# Patient Record
Sex: FEMALE | Race: WHITE | NOT HISPANIC OR LATINO | Employment: OTHER | URBAN - METROPOLITAN AREA
[De-identification: names, ages, dates, MRNs, and addresses within clinical notes are randomized per-mention and may not be internally consistent; named-entity substitution may affect disease eponyms.]

---

## 2019-01-22 ENCOUNTER — APPOINTMENT (OUTPATIENT)
Dept: RADIOLOGY | Facility: HOSPITAL | Age: 74
End: 2019-01-22
Payer: MEDICARE

## 2019-01-22 DIAGNOSIS — Z13.820 SCREENING FOR OSTEOPOROSIS: ICD-10-CM

## 2019-01-22 DIAGNOSIS — Z13.820 ENCOUNTER FOR SCREENING FOR OSTEOPOROSIS: ICD-10-CM

## 2019-01-22 PROCEDURE — 77080 DEXA BONE DENSITY SPINE HIP: ICD-10-PCS | Mod: 26,,, | Performed by: RADIOLOGY

## 2019-01-22 PROCEDURE — 77080 DXA BONE DENSITY AXIAL: CPT | Mod: TC

## 2019-01-22 PROCEDURE — 77080 DXA BONE DENSITY AXIAL: CPT | Mod: 26,,, | Performed by: RADIOLOGY

## 2020-05-20 LAB
CHOLEST SERPL-MSCNC: 163 MG/DL (ref 0–200)
HDLC SERPL-MCNC: 53 MG/DL
LDL/HDL RATIO: 1.6
LDLC SERPL CALC-MCNC: 83 MG/DL (ref 0–160)
NON HDL CHOL. (LDL+VLDL): 110
TRIGL SERPL-MCNC: 177 MG/DL

## 2020-08-14 LAB — A1C EXTERNAL: 7.1

## 2020-10-12 ENCOUNTER — OFFICE VISIT (OUTPATIENT)
Dept: INTERNAL MEDICINE | Facility: CLINIC | Age: 75
End: 2020-10-12
Payer: MEDICARE

## 2020-10-12 ENCOUNTER — LAB VISIT (OUTPATIENT)
Dept: LAB | Facility: HOSPITAL | Age: 75
End: 2020-10-12
Attending: FAMILY MEDICINE
Payer: MEDICARE

## 2020-10-12 ENCOUNTER — IMMUNIZATION (OUTPATIENT)
Dept: PHARMACY | Facility: CLINIC | Age: 75
End: 2020-10-12
Payer: MEDICARE

## 2020-10-12 VITALS
OXYGEN SATURATION: 98 % | HEIGHT: 66 IN | RESPIRATION RATE: 18 BRPM | WEIGHT: 181.88 LBS | DIASTOLIC BLOOD PRESSURE: 72 MMHG | TEMPERATURE: 98 F | BODY MASS INDEX: 29.23 KG/M2 | HEART RATE: 73 BPM | SYSTOLIC BLOOD PRESSURE: 136 MMHG

## 2020-10-12 DIAGNOSIS — I10 HYPERTENSION, UNSPECIFIED TYPE: ICD-10-CM

## 2020-10-12 DIAGNOSIS — E11.8 CONTROLLED TYPE 2 DIABETES MELLITUS WITH COMPLICATION, WITHOUT LONG-TERM CURRENT USE OF INSULIN: ICD-10-CM

## 2020-10-12 DIAGNOSIS — E03.9 HYPOTHYROIDISM, UNSPECIFIED TYPE: ICD-10-CM

## 2020-10-12 DIAGNOSIS — N18.9 CHRONIC KIDNEY DISEASE, UNSPECIFIED CKD STAGE: ICD-10-CM

## 2020-10-12 DIAGNOSIS — J30.9 ALLERGIC RHINITIS, UNSPECIFIED SEASONALITY, UNSPECIFIED TRIGGER: ICD-10-CM

## 2020-10-12 DIAGNOSIS — E78.5 HYPERLIPIDEMIA, UNSPECIFIED HYPERLIPIDEMIA TYPE: ICD-10-CM

## 2020-10-12 DIAGNOSIS — K76.0 FATTY LIVER: ICD-10-CM

## 2020-10-12 DIAGNOSIS — Z76.89 ENCOUNTER TO ESTABLISH CARE: Primary | ICD-10-CM

## 2020-10-12 DIAGNOSIS — K80.20 GALLSTONES: ICD-10-CM

## 2020-10-12 LAB
ALBUMIN SERPL BCP-MCNC: 3.9 G/DL (ref 3.5–5.2)
ALP SERPL-CCNC: 59 U/L (ref 55–135)
ALT SERPL W/O P-5'-P-CCNC: 24 U/L (ref 10–44)
ANION GAP SERPL CALC-SCNC: 12 MMOL/L (ref 8–16)
AST SERPL-CCNC: 22 U/L (ref 10–40)
BASOPHILS # BLD AUTO: 0.07 K/UL (ref 0–0.2)
BASOPHILS NFR BLD: 0.7 % (ref 0–1.9)
BILIRUB SERPL-MCNC: 0.3 MG/DL (ref 0.1–1)
BUN SERPL-MCNC: 20 MG/DL (ref 8–23)
CALCIUM SERPL-MCNC: 10.2 MG/DL (ref 8.7–10.5)
CHLORIDE SERPL-SCNC: 102 MMOL/L (ref 95–110)
CHOLEST SERPL-MCNC: 127 MG/DL (ref 120–199)
CHOLEST/HDLC SERPL: 2.5 {RATIO} (ref 2–5)
CO2 SERPL-SCNC: 23 MMOL/L (ref 23–29)
CREAT SERPL-MCNC: 1.2 MG/DL (ref 0.5–1.4)
DIFFERENTIAL METHOD: ABNORMAL
EOSINOPHIL # BLD AUTO: 0.1 K/UL (ref 0–0.5)
EOSINOPHIL NFR BLD: 0.9 % (ref 0–8)
ERYTHROCYTE [DISTWIDTH] IN BLOOD BY AUTOMATED COUNT: 14 % (ref 11.5–14.5)
EST. GFR  (AFRICAN AMERICAN): 52.2 ML/MIN/1.73 M^2
EST. GFR  (NON AFRICAN AMERICAN): 45.3 ML/MIN/1.73 M^2
ESTIMATED AVG GLUCOSE: 137 MG/DL (ref 68–131)
GLUCOSE SERPL-MCNC: 113 MG/DL (ref 70–110)
HBA1C MFR BLD HPLC: 6.4 % (ref 4–5.6)
HCT VFR BLD AUTO: 38.6 % (ref 37–48.5)
HDLC SERPL-MCNC: 50 MG/DL (ref 40–75)
HDLC SERPL: 39.4 % (ref 20–50)
HGB BLD-MCNC: 12.2 G/DL (ref 12–16)
IMM GRANULOCYTES # BLD AUTO: 0.03 K/UL (ref 0–0.04)
IMM GRANULOCYTES NFR BLD AUTO: 0.3 % (ref 0–0.5)
LDLC SERPL CALC-MCNC: 52.4 MG/DL (ref 63–159)
LYMPHOCYTES # BLD AUTO: 2.5 K/UL (ref 1–4.8)
LYMPHOCYTES NFR BLD: 24.2 % (ref 18–48)
MCH RBC QN AUTO: 28.1 PG (ref 27–31)
MCHC RBC AUTO-ENTMCNC: 31.6 G/DL (ref 32–36)
MCV RBC AUTO: 89 FL (ref 82–98)
MONOCYTES # BLD AUTO: 0.7 K/UL (ref 0.3–1)
MONOCYTES NFR BLD: 6.3 % (ref 4–15)
NEUTROPHILS # BLD AUTO: 7 K/UL (ref 1.8–7.7)
NEUTROPHILS NFR BLD: 67.6 % (ref 38–73)
NONHDLC SERPL-MCNC: 77 MG/DL
NRBC BLD-RTO: 0 /100 WBC
PLATELET # BLD AUTO: 349 K/UL (ref 150–350)
PMV BLD AUTO: 11.9 FL (ref 9.2–12.9)
POTASSIUM SERPL-SCNC: 4.3 MMOL/L (ref 3.5–5.1)
PROT SERPL-MCNC: 7.4 G/DL (ref 6–8.4)
RBC # BLD AUTO: 4.34 M/UL (ref 4–5.4)
SODIUM SERPL-SCNC: 137 MMOL/L (ref 136–145)
TRIGL SERPL-MCNC: 123 MG/DL (ref 30–150)
TSH SERPL DL<=0.005 MIU/L-ACNC: 0.76 UIU/ML (ref 0.4–4)
WBC # BLD AUTO: 10.3 K/UL (ref 3.9–12.7)

## 2020-10-12 PROCEDURE — 3008F PR BODY MASS INDEX (BMI) DOCUMENTED: ICD-10-PCS | Mod: HCNC,CPTII,S$GLB, | Performed by: FAMILY MEDICINE

## 2020-10-12 PROCEDURE — 1100F PR PT FALLS ASSESS DOC 2+ FALLS/FALL W/INJURY/YR: ICD-10-PCS | Mod: HCNC,CPTII,S$GLB, | Performed by: FAMILY MEDICINE

## 2020-10-12 PROCEDURE — 99999 PR PBB SHADOW E&M-EST. PATIENT-LVL IV: CPT | Mod: PBBFAC,HCNC,, | Performed by: FAMILY MEDICINE

## 2020-10-12 PROCEDURE — 80053 COMPREHEN METABOLIC PANEL: CPT | Mod: HCNC

## 2020-10-12 PROCEDURE — 1100F PTFALLS ASSESS-DOCD GE2>/YR: CPT | Mod: HCNC,CPTII,S$GLB, | Performed by: FAMILY MEDICINE

## 2020-10-12 PROCEDURE — 83036 HEMOGLOBIN GLYCOSYLATED A1C: CPT | Mod: HCNC

## 2020-10-12 PROCEDURE — 1126F AMNT PAIN NOTED NONE PRSNT: CPT | Mod: HCNC,S$GLB,, | Performed by: FAMILY MEDICINE

## 2020-10-12 PROCEDURE — 80061 LIPID PANEL: CPT | Mod: HCNC

## 2020-10-12 PROCEDURE — 1159F PR MEDICATION LIST DOCUMENTED IN MEDICAL RECORD: ICD-10-PCS | Mod: HCNC,S$GLB,, | Performed by: FAMILY MEDICINE

## 2020-10-12 PROCEDURE — 99203 PR OFFICE/OUTPT VISIT, NEW, LEVL III, 30-44 MIN: ICD-10-PCS | Mod: HCNC,S$GLB,, | Performed by: FAMILY MEDICINE

## 2020-10-12 PROCEDURE — 3288F FALL RISK ASSESSMENT DOCD: CPT | Mod: HCNC,CPTII,S$GLB, | Performed by: FAMILY MEDICINE

## 2020-10-12 PROCEDURE — 36415 COLL VENOUS BLD VENIPUNCTURE: CPT | Mod: HCNC

## 2020-10-12 PROCEDURE — 1159F MED LIST DOCD IN RCRD: CPT | Mod: HCNC,S$GLB,, | Performed by: FAMILY MEDICINE

## 2020-10-12 PROCEDURE — 85025 COMPLETE CBC W/AUTO DIFF WBC: CPT | Mod: HCNC

## 2020-10-12 PROCEDURE — 99999 PR PBB SHADOW E&M-EST. PATIENT-LVL IV: ICD-10-PCS | Mod: PBBFAC,HCNC,, | Performed by: FAMILY MEDICINE

## 2020-10-12 PROCEDURE — 3288F PR FALLS RISK ASSESSMENT DOCUMENTED: ICD-10-PCS | Mod: HCNC,CPTII,S$GLB, | Performed by: FAMILY MEDICINE

## 2020-10-12 PROCEDURE — 1126F PR PAIN SEVERITY QUANTIFIED, NO PAIN PRESENT: ICD-10-PCS | Mod: HCNC,S$GLB,, | Performed by: FAMILY MEDICINE

## 2020-10-12 PROCEDURE — 84443 ASSAY THYROID STIM HORMONE: CPT | Mod: HCNC

## 2020-10-12 PROCEDURE — 99203 OFFICE O/P NEW LOW 30 MIN: CPT | Mod: HCNC,S$GLB,, | Performed by: FAMILY MEDICINE

## 2020-10-12 PROCEDURE — 3008F BODY MASS INDEX DOCD: CPT | Mod: HCNC,CPTII,S$GLB, | Performed by: FAMILY MEDICINE

## 2020-10-12 RX ORDER — PRAVASTATIN SODIUM 80 MG/1
TABLET ORAL
COMMUNITY
End: 2021-04-05 | Stop reason: SDUPTHER

## 2020-10-12 RX ORDER — LEVOTHYROXINE SODIUM 75 UG/1
TABLET ORAL
COMMUNITY
Start: 2020-09-02 | End: 2021-04-05 | Stop reason: SDUPTHER

## 2020-10-12 RX ORDER — ATENOLOL 25 MG/1
TABLET ORAL
COMMUNITY
End: 2021-04-05 | Stop reason: SDUPTHER

## 2020-10-12 RX ORDER — GLIMEPIRIDE 2 MG/1
1 TABLET ORAL
COMMUNITY
End: 2021-04-05 | Stop reason: SDUPTHER

## 2020-10-12 RX ORDER — FLUTICASONE PROPIONATE 50 MCG
1 SPRAY, SUSPENSION (ML) NASAL DAILY
COMMUNITY
End: 2021-04-05 | Stop reason: SDUPTHER

## 2020-10-12 RX ORDER — ASPIRIN 81 MG/1
TABLET ORAL
COMMUNITY
End: 2021-04-05 | Stop reason: SDUPTHER

## 2020-10-12 RX ORDER — METFORMIN HYDROCHLORIDE 500 MG/1
TABLET ORAL
COMMUNITY
End: 2021-04-05 | Stop reason: SDUPTHER

## 2020-10-12 RX ORDER — FLUTICASONE PROPIONATE 50 UG/1
1 POWDER, METERED RESPIRATORY (INHALATION)
COMMUNITY
End: 2021-04-05 | Stop reason: SDUPTHER

## 2020-10-12 RX ORDER — LISINOPRIL AND HYDROCHLOROTHIAZIDE 12.5; 2 MG/1; MG/1
TABLET ORAL
COMMUNITY
End: 2021-04-05 | Stop reason: SDUPTHER

## 2020-10-12 RX ORDER — ASCORBIC ACID 250 MG
TABLET ORAL
COMMUNITY

## 2020-10-12 RX ORDER — AMLODIPINE BESYLATE 10 MG/1
TABLET ORAL
COMMUNITY
End: 2021-04-05 | Stop reason: SDUPTHER

## 2020-10-12 NOTE — PROGRESS NOTES
Subjective:       Patient ID: Patsy Perez is a 74 y.o. female.    Chief Complaint: Establish Care    HPI     74    Past Medical History:   Diagnosis Date    CKD (chronic kidney disease)     DM II (diabetes mellitus, type II), controlled     Fatty liver     Gallstones     found on US    HLD (hyperlipidemia)     Hypertension     Hypothyroidism     s/p thyroidectomy         Past Surgical History:   Procedure Laterality Date    APPENDECTOMY      1985    HYSTERECTOMY      2009 - Dr[facility]    THYROIDECTOMY      1985       Social History     Tobacco Use   Smoking Status Former Smoker     Family History   Problem Relation Age of Onset    Diabetes Mother     Breast cancer Mother         late 70s    Osteoarthritis Mother         double knee replacement    Heart disease Father        Checks sugars and pressures    Sugar high in am  No higher than 180 ever    Checks pressure  Controlled  No hypotensive episodes    Has been tested for hep c    Due for shingrix - had old vaccine    Tolerating medications w/out issue    Gallstones  Asymptomatic  Has seen GI.          Review of Systems   Constitutional: Negative for chills and fever.   HENT: Negative for trouble swallowing.    Eyes: Negative for visual disturbance.   Respiratory: Negative for shortness of breath.    Cardiovascular: Negative for chest pain.   Gastrointestinal: Negative for abdominal pain.   Skin: Negative for color change.   Neurological: Negative for dizziness.   Psychiatric/Behavioral: Negative for confusion.       Objective:       Vitals:    10/12/20 0914   BP: 136/72   Pulse: 73   Resp: 18   Temp: 97.9 °F (36.6 °C)       Protective Sensation (w/ 10 gram monofilament):  Right: Intact  Left: Intact    Visual Inspection:  Normal -  Bilateral    Pedal Pulses:   Right: Present  Left: Present    Posterior tibialis:   Right:Present  Left: Present        Physical Exam  Constitutional:       General: She is not in acute  distress.     Appearance: Normal appearance. She is well-developed.   HENT:      Head: Normocephalic and atraumatic.   Eyes:      General: Lids are normal.      Conjunctiva/sclera: Conjunctivae normal.   Neck:      Musculoskeletal: Full passive range of motion without pain and normal range of motion.   Cardiovascular:      Rate and Rhythm: Normal rate and regular rhythm.      Heart sounds: Normal heart sounds.   Pulmonary:      Effort: Pulmonary effort is normal. No respiratory distress.      Breath sounds: Normal breath sounds.   Abdominal:      General: There is no distension.      Palpations: Abdomen is soft.   Musculoskeletal: Normal range of motion.   Lymphadenopathy:      Cervical: No cervical adenopathy.   Skin:     Coloration: Skin is not pale.   Neurological:      Mental Status: She is alert and oriented to person, place, and time. She is not disoriented.   Psychiatric:         Speech: Speech normal.         Behavior: Behavior normal.         Thought Content: Thought content normal.         Assessment:       1. Encounter to establish care    2. Hypertension, unspecified type    3. Hyperlipidemia, unspecified hyperlipidemia type    4. Hypothyroidism, unspecified type    5. Chronic kidney disease, unspecified CKD stage    6. Controlled type 2 diabetes mellitus with complication, without long-term current use of insulin    7. Allergic rhinitis, unspecified seasonality, unspecified trigger        Plan:   Encounter to establish care    Hypertension, unspecified type  Well controlled  Lisinopril-hctz 20-12.5  Atenolol 25 mg  Amlodipine 10 mg  Controlled.      Hyperlipidemia  Pravastatin  Lipid profile      Hypothyroidism  Synthroid  TSH check      Chronic kidney disease  Uncertain of stage  Will check CMP    DM II  A1c check  On metformin 500 ( need to watch renal function )  amaryl 2 mg  januvia 100 mg    Immunization due  Shingles shot    Allergic rhinitis  flonase    Sign for mammo and colon reports and eye  exam

## 2020-10-13 ENCOUNTER — PATIENT OUTREACH (OUTPATIENT)
Dept: ADMINISTRATIVE | Facility: HOSPITAL | Age: 75
End: 2020-10-13

## 2020-10-13 NOTE — PROGRESS NOTES
AMBAR uploaded and faxed to Salvatore Barajas MD for Mammogram & Ambreen Link MD for colonoscopy.

## 2020-10-14 ENCOUNTER — PATIENT OUTREACH (OUTPATIENT)
Dept: ADMINISTRATIVE | Facility: HOSPITAL | Age: 75
End: 2020-10-14

## 2020-11-12 ENCOUNTER — PATIENT OUTREACH (OUTPATIENT)
Dept: ADMINISTRATIVE | Facility: HOSPITAL | Age: 75
End: 2020-11-12

## 2020-12-11 ENCOUNTER — PATIENT MESSAGE (OUTPATIENT)
Dept: OTHER | Facility: OTHER | Age: 75
End: 2020-12-11

## 2020-12-14 ENCOUNTER — IMMUNIZATION (OUTPATIENT)
Dept: PHARMACY | Facility: CLINIC | Age: 75
End: 2020-12-14
Payer: MEDICARE

## 2021-01-10 ENCOUNTER — IMMUNIZATION (OUTPATIENT)
Dept: INTERNAL MEDICINE | Facility: CLINIC | Age: 76
End: 2021-01-10
Payer: MEDICARE

## 2021-01-10 DIAGNOSIS — Z23 NEED FOR VACCINATION: ICD-10-CM

## 2021-01-10 PROCEDURE — 91300 COVID-19, MRNA, LNP-S, PF, 30 MCG/0.3 ML DOSE VACCINE: CPT | Mod: PBBFAC | Performed by: FAMILY MEDICINE

## 2021-01-12 ENCOUNTER — LAB VISIT (OUTPATIENT)
Dept: LAB | Facility: HOSPITAL | Age: 76
End: 2021-01-12
Attending: FAMILY MEDICINE
Payer: MEDICARE

## 2021-01-12 ENCOUNTER — OFFICE VISIT (OUTPATIENT)
Dept: INTERNAL MEDICINE | Facility: CLINIC | Age: 76
End: 2021-01-12
Payer: MEDICARE

## 2021-01-12 VITALS
OXYGEN SATURATION: 99 % | SYSTOLIC BLOOD PRESSURE: 131 MMHG | DIASTOLIC BLOOD PRESSURE: 72 MMHG | TEMPERATURE: 98 F | WEIGHT: 181.88 LBS | RESPIRATION RATE: 18 BRPM | BODY MASS INDEX: 29.81 KG/M2 | HEART RATE: 71 BPM

## 2021-01-12 DIAGNOSIS — E03.9 HYPOTHYROIDISM, UNSPECIFIED TYPE: ICD-10-CM

## 2021-01-12 DIAGNOSIS — E11.8 CONTROLLED TYPE 2 DIABETES MELLITUS WITH COMPLICATION, WITHOUT LONG-TERM CURRENT USE OF INSULIN: Primary | ICD-10-CM

## 2021-01-12 DIAGNOSIS — I10 HYPERTENSION, UNSPECIFIED TYPE: ICD-10-CM

## 2021-01-12 DIAGNOSIS — N18.31 STAGE 3A CHRONIC KIDNEY DISEASE: ICD-10-CM

## 2021-01-12 DIAGNOSIS — E78.5 HYPERLIPIDEMIA, UNSPECIFIED HYPERLIPIDEMIA TYPE: ICD-10-CM

## 2021-01-12 LAB
ALBUMIN SERPL BCP-MCNC: 3.8 G/DL (ref 3.5–5.2)
ALBUMIN/CREAT UR: NORMAL UG/MG (ref 0–30)
ALP SERPL-CCNC: 69 U/L (ref 55–135)
ALT SERPL W/O P-5'-P-CCNC: 26 U/L (ref 10–44)
ANION GAP SERPL CALC-SCNC: 12 MMOL/L (ref 8–16)
AST SERPL-CCNC: 25 U/L (ref 10–40)
BILIRUB SERPL-MCNC: 0.4 MG/DL (ref 0.1–1)
BUN SERPL-MCNC: 17 MG/DL (ref 8–23)
CALCIUM SERPL-MCNC: 9.1 MG/DL (ref 8.7–10.5)
CHLORIDE SERPL-SCNC: 99 MMOL/L (ref 95–110)
CO2 SERPL-SCNC: 24 MMOL/L (ref 23–29)
CREAT SERPL-MCNC: 1.1 MG/DL (ref 0.5–1.4)
CREAT UR-MCNC: 50 MG/DL (ref 15–325)
EST. GFR  (AFRICAN AMERICAN): 57.6 ML/MIN/1.73 M^2
EST. GFR  (NON AFRICAN AMERICAN): 49.9 ML/MIN/1.73 M^2
GLUCOSE SERPL-MCNC: 106 MG/DL (ref 70–110)
MICROALBUMIN UR DL<=1MG/L-MCNC: <2.5 UG/ML
POTASSIUM SERPL-SCNC: 4.5 MMOL/L (ref 3.5–5.1)
PROT SERPL-MCNC: 7.3 G/DL (ref 6–8.4)
SODIUM SERPL-SCNC: 135 MMOL/L (ref 136–145)

## 2021-01-12 PROCEDURE — 3044F HG A1C LEVEL LT 7.0%: CPT | Mod: HCNC,CPTII,S$GLB, | Performed by: FAMILY MEDICINE

## 2021-01-12 PROCEDURE — 1126F PR PAIN SEVERITY QUANTIFIED, NO PAIN PRESENT: ICD-10-PCS | Mod: HCNC,S$GLB,, | Performed by: FAMILY MEDICINE

## 2021-01-12 PROCEDURE — 80053 COMPREHEN METABOLIC PANEL: CPT | Mod: HCNC

## 2021-01-12 PROCEDURE — 1126F AMNT PAIN NOTED NONE PRSNT: CPT | Mod: HCNC,S$GLB,, | Performed by: FAMILY MEDICINE

## 2021-01-12 PROCEDURE — 3288F PR FALLS RISK ASSESSMENT DOCUMENTED: ICD-10-PCS | Mod: HCNC,CPTII,S$GLB, | Performed by: FAMILY MEDICINE

## 2021-01-12 PROCEDURE — 36415 COLL VENOUS BLD VENIPUNCTURE: CPT | Mod: HCNC

## 2021-01-12 PROCEDURE — 82570 ASSAY OF URINE CREATININE: CPT | Mod: HCNC

## 2021-01-12 PROCEDURE — 99999 PR PBB SHADOW E&M-EST. PATIENT-LVL III: CPT | Mod: PBBFAC,HCNC,, | Performed by: FAMILY MEDICINE

## 2021-01-12 PROCEDURE — 82043 UR ALBUMIN QUANTITATIVE: CPT | Mod: HCNC

## 2021-01-12 PROCEDURE — 3078F PR MOST RECENT DIASTOLIC BLOOD PRESSURE < 80 MM HG: ICD-10-PCS | Mod: HCNC,CPTII,S$GLB, | Performed by: FAMILY MEDICINE

## 2021-01-12 PROCEDURE — 99214 PR OFFICE/OUTPT VISIT, EST, LEVL IV, 30-39 MIN: ICD-10-PCS | Mod: HCNC,S$GLB,, | Performed by: FAMILY MEDICINE

## 2021-01-12 PROCEDURE — 3075F SYST BP GE 130 - 139MM HG: CPT | Mod: HCNC,CPTII,S$GLB, | Performed by: FAMILY MEDICINE

## 2021-01-12 PROCEDURE — 3075F PR MOST RECENT SYSTOLIC BLOOD PRESS GE 130-139MM HG: ICD-10-PCS | Mod: HCNC,CPTII,S$GLB, | Performed by: FAMILY MEDICINE

## 2021-01-12 PROCEDURE — 3008F PR BODY MASS INDEX (BMI) DOCUMENTED: ICD-10-PCS | Mod: HCNC,CPTII,S$GLB, | Performed by: FAMILY MEDICINE

## 2021-01-12 PROCEDURE — 3008F BODY MASS INDEX DOCD: CPT | Mod: HCNC,CPTII,S$GLB, | Performed by: FAMILY MEDICINE

## 2021-01-12 PROCEDURE — 99999 PR PBB SHADOW E&M-EST. PATIENT-LVL III: ICD-10-PCS | Mod: PBBFAC,HCNC,, | Performed by: FAMILY MEDICINE

## 2021-01-12 PROCEDURE — 3044F PR MOST RECENT HEMOGLOBIN A1C LEVEL <7.0%: ICD-10-PCS | Mod: HCNC,CPTII,S$GLB, | Performed by: FAMILY MEDICINE

## 2021-01-12 PROCEDURE — 1159F MED LIST DOCD IN RCRD: CPT | Mod: HCNC,S$GLB,, | Performed by: FAMILY MEDICINE

## 2021-01-12 PROCEDURE — 99214 OFFICE O/P EST MOD 30 MIN: CPT | Mod: HCNC,S$GLB,, | Performed by: FAMILY MEDICINE

## 2021-01-12 PROCEDURE — 3288F FALL RISK ASSESSMENT DOCD: CPT | Mod: HCNC,CPTII,S$GLB, | Performed by: FAMILY MEDICINE

## 2021-01-12 PROCEDURE — 1101F PT FALLS ASSESS-DOCD LE1/YR: CPT | Mod: HCNC,CPTII,S$GLB, | Performed by: FAMILY MEDICINE

## 2021-01-12 PROCEDURE — 1101F PR PT FALLS ASSESS DOC 0-1 FALLS W/OUT INJ PAST YR: ICD-10-PCS | Mod: HCNC,CPTII,S$GLB, | Performed by: FAMILY MEDICINE

## 2021-01-12 PROCEDURE — 3078F DIAST BP <80 MM HG: CPT | Mod: HCNC,CPTII,S$GLB, | Performed by: FAMILY MEDICINE

## 2021-01-12 PROCEDURE — 1159F PR MEDICATION LIST DOCUMENTED IN MEDICAL RECORD: ICD-10-PCS | Mod: HCNC,S$GLB,, | Performed by: FAMILY MEDICINE

## 2021-01-31 ENCOUNTER — IMMUNIZATION (OUTPATIENT)
Dept: INTERNAL MEDICINE | Facility: CLINIC | Age: 76
End: 2021-01-31
Payer: MEDICARE

## 2021-01-31 DIAGNOSIS — Z23 NEED FOR VACCINATION: Primary | ICD-10-CM

## 2021-01-31 PROCEDURE — 91300 COVID-19, MRNA, LNP-S, PF, 30 MCG/0.3 ML DOSE VACCINE: CPT | Mod: PBBFAC | Performed by: FAMILY MEDICINE

## 2021-01-31 PROCEDURE — 0002A COVID-19, MRNA, LNP-S, PF, 30 MCG/0.3 ML DOSE VACCINE: CPT | Mod: PBBFAC | Performed by: FAMILY MEDICINE

## 2021-02-05 ENCOUNTER — PATIENT OUTREACH (OUTPATIENT)
Dept: ADMINISTRATIVE | Facility: HOSPITAL | Age: 76
End: 2021-02-05

## 2021-02-22 LAB
LEFT EYE DM RETINOPATHY: NEGATIVE
RIGHT EYE DM RETINOPATHY: NEGATIVE

## 2021-04-07 RX ORDER — ATENOLOL 25 MG/1
TABLET ORAL
Qty: 90 TABLET | Refills: 1 | Status: SHIPPED | OUTPATIENT
Start: 2021-04-07 | End: 2021-04-13 | Stop reason: SDUPTHER

## 2021-04-07 RX ORDER — LEVOTHYROXINE SODIUM 75 UG/1
TABLET ORAL
Qty: 90 TABLET | Refills: 1 | Status: SHIPPED | OUTPATIENT
Start: 2021-04-07 | End: 2022-03-02 | Stop reason: SDUPTHER

## 2021-04-07 RX ORDER — FLUTICASONE PROPIONATE 50 MCG
1 SPRAY, SUSPENSION (ML) NASAL DAILY
Qty: 18.2 ML | Refills: 4 | Status: SHIPPED | OUTPATIENT
Start: 2021-04-07 | End: 2021-12-23

## 2021-04-07 RX ORDER — AMLODIPINE BESYLATE 10 MG/1
TABLET ORAL
Qty: 90 TABLET | Refills: 1 | Status: SHIPPED | OUTPATIENT
Start: 2021-04-07 | End: 2021-04-13 | Stop reason: SDUPTHER

## 2021-04-07 RX ORDER — GLIMEPIRIDE 2 MG/1
TABLET ORAL
Qty: 90 TABLET | Refills: 1 | Status: SHIPPED | OUTPATIENT
Start: 2021-04-07 | End: 2021-04-13 | Stop reason: SDUPTHER

## 2021-04-07 RX ORDER — LISINOPRIL AND HYDROCHLOROTHIAZIDE 12.5; 2 MG/1; MG/1
1 TABLET ORAL DAILY
Qty: 90 TABLET | Refills: 1 | Status: SHIPPED | OUTPATIENT
Start: 2021-04-07 | End: 2021-04-13 | Stop reason: SDUPTHER

## 2021-04-07 RX ORDER — ASCORBIC ACID 250 MG
TABLET ORAL
OUTPATIENT
Start: 2021-04-07

## 2021-04-07 RX ORDER — METFORMIN HYDROCHLORIDE 500 MG/1
TABLET ORAL
Qty: 90 TABLET | Refills: 1 | Status: SHIPPED | OUTPATIENT
Start: 2021-04-07 | End: 2021-04-13 | Stop reason: SDUPTHER

## 2021-04-07 RX ORDER — LANCETS
1 EACH MISCELLANEOUS 2 TIMES DAILY
Qty: 100 EACH | Refills: 1 | Status: SHIPPED | OUTPATIENT
Start: 2021-04-07 | End: 2021-12-13 | Stop reason: SDUPTHER

## 2021-04-07 RX ORDER — FLUTICASONE PROPIONATE 50 UG/1
1 POWDER, METERED RESPIRATORY (INHALATION) DAILY
Qty: 1 EACH | Refills: 4 | Status: SHIPPED | OUTPATIENT
Start: 2021-04-07 | End: 2021-06-11

## 2021-04-07 RX ORDER — ASPIRIN 81 MG/1
TABLET ORAL
Qty: 90 TABLET | Refills: 1 | Status: SHIPPED | OUTPATIENT
Start: 2021-04-07

## 2021-04-07 RX ORDER — PRAVASTATIN SODIUM 80 MG/1
TABLET ORAL
Qty: 90 TABLET | Refills: 1 | Status: SHIPPED | OUTPATIENT
Start: 2021-04-07 | End: 2021-04-13 | Stop reason: SDUPTHER

## 2021-04-09 ENCOUNTER — TELEPHONE (OUTPATIENT)
Dept: FAMILY MEDICINE | Facility: CLINIC | Age: 76
End: 2021-04-09

## 2021-04-12 ENCOUNTER — TELEPHONE (OUTPATIENT)
Dept: INTERNAL MEDICINE | Facility: CLINIC | Age: 76
End: 2021-04-12

## 2021-04-13 RX ORDER — ATENOLOL 25 MG/1
25 TABLET ORAL DAILY
Qty: 90 TABLET | Refills: 1 | Status: SHIPPED | OUTPATIENT
Start: 2021-04-13 | End: 2021-07-29

## 2021-04-13 RX ORDER — AMLODIPINE BESYLATE 10 MG/1
10 TABLET ORAL DAILY
Qty: 90 TABLET | Refills: 1 | Status: SHIPPED | OUTPATIENT
Start: 2021-04-13 | End: 2021-09-17

## 2021-04-13 RX ORDER — METFORMIN HYDROCHLORIDE 500 MG/1
500 TABLET ORAL 2 TIMES DAILY WITH MEALS
Qty: 180 TABLET | Refills: 1 | Status: SHIPPED | OUTPATIENT
Start: 2021-04-13 | End: 2021-11-29 | Stop reason: SDUPTHER

## 2021-04-13 RX ORDER — LISINOPRIL AND HYDROCHLOROTHIAZIDE 12.5; 2 MG/1; MG/1
2 TABLET ORAL DAILY
Qty: 90 TABLET | Refills: 1 | Status: SHIPPED | OUTPATIENT
Start: 2021-04-13 | End: 2021-09-17

## 2021-04-13 RX ORDER — GLIMEPIRIDE 2 MG/1
1 TABLET ORAL
Qty: 90 TABLET | Refills: 1 | Status: SHIPPED | OUTPATIENT
Start: 2021-04-13 | End: 2022-04-21

## 2021-04-13 RX ORDER — PRAVASTATIN SODIUM 80 MG/1
80 TABLET ORAL DAILY
Qty: 90 TABLET | Refills: 1 | Status: SHIPPED | OUTPATIENT
Start: 2021-04-13 | End: 2022-04-21

## 2021-05-19 DIAGNOSIS — E11.8 CONTROLLED TYPE 2 DIABETES MELLITUS WITH COMPLICATION, WITHOUT LONG-TERM CURRENT USE OF INSULIN: Primary | ICD-10-CM

## 2021-05-20 ENCOUNTER — TELEPHONE (OUTPATIENT)
Dept: INTERNAL MEDICINE | Facility: CLINIC | Age: 76
End: 2021-05-20

## 2021-05-21 ENCOUNTER — LAB VISIT (OUTPATIENT)
Dept: LAB | Facility: HOSPITAL | Age: 76
End: 2021-05-21
Attending: FAMILY MEDICINE
Payer: MEDICARE

## 2021-05-21 DIAGNOSIS — E11.8 CONTROLLED TYPE 2 DIABETES MELLITUS WITH COMPLICATION, WITHOUT LONG-TERM CURRENT USE OF INSULIN: ICD-10-CM

## 2021-05-21 LAB
ALBUMIN SERPL BCP-MCNC: 3.8 G/DL (ref 3.5–5.2)
ALP SERPL-CCNC: 60 U/L (ref 55–135)
ALT SERPL W/O P-5'-P-CCNC: 24 U/L (ref 10–44)
ANION GAP SERPL CALC-SCNC: 12 MMOL/L (ref 8–16)
AST SERPL-CCNC: 26 U/L (ref 10–40)
BILIRUB SERPL-MCNC: 0.4 MG/DL (ref 0.1–1)
BUN SERPL-MCNC: 29 MG/DL (ref 8–23)
CALCIUM SERPL-MCNC: 9.6 MG/DL (ref 8.7–10.5)
CHLORIDE SERPL-SCNC: 103 MMOL/L (ref 95–110)
CO2 SERPL-SCNC: 21 MMOL/L (ref 23–29)
CREAT SERPL-MCNC: 1.4 MG/DL (ref 0.5–1.4)
EST. GFR  (AFRICAN AMERICAN): 43 ML/MIN/1.73 M^2
EST. GFR  (NON AFRICAN AMERICAN): 37.3 ML/MIN/1.73 M^2
ESTIMATED AVG GLUCOSE: 166 MG/DL (ref 68–131)
GLUCOSE SERPL-MCNC: 165 MG/DL (ref 70–110)
HBA1C MFR BLD: 7.4 % (ref 4–5.6)
POTASSIUM SERPL-SCNC: 4.1 MMOL/L (ref 3.5–5.1)
PROT SERPL-MCNC: 7.3 G/DL (ref 6–8.4)
SODIUM SERPL-SCNC: 136 MMOL/L (ref 136–145)

## 2021-05-21 PROCEDURE — 36415 COLL VENOUS BLD VENIPUNCTURE: CPT | Performed by: FAMILY MEDICINE

## 2021-05-21 PROCEDURE — 80053 COMPREHEN METABOLIC PANEL: CPT | Performed by: FAMILY MEDICINE

## 2021-05-21 PROCEDURE — 83036 HEMOGLOBIN GLYCOSYLATED A1C: CPT | Performed by: FAMILY MEDICINE

## 2021-05-25 ENCOUNTER — TELEPHONE (OUTPATIENT)
Dept: INTERNAL MEDICINE | Facility: CLINIC | Age: 76
End: 2021-05-25

## 2021-06-11 ENCOUNTER — OFFICE VISIT (OUTPATIENT)
Dept: INTERNAL MEDICINE | Facility: CLINIC | Age: 76
End: 2021-06-11
Payer: MEDICARE

## 2021-06-11 ENCOUNTER — LAB VISIT (OUTPATIENT)
Dept: LAB | Facility: HOSPITAL | Age: 76
End: 2021-06-11
Attending: FAMILY MEDICINE
Payer: MEDICARE

## 2021-06-11 VITALS
DIASTOLIC BLOOD PRESSURE: 60 MMHG | SYSTOLIC BLOOD PRESSURE: 128 MMHG | TEMPERATURE: 98 F | HEART RATE: 80 BPM | BODY MASS INDEX: 28.49 KG/M2 | WEIGHT: 177.25 LBS | OXYGEN SATURATION: 96 % | HEIGHT: 66 IN

## 2021-06-11 DIAGNOSIS — E03.9 HYPOTHYROIDISM, UNSPECIFIED TYPE: ICD-10-CM

## 2021-06-11 DIAGNOSIS — I10 HYPERTENSION, UNSPECIFIED TYPE: ICD-10-CM

## 2021-06-11 DIAGNOSIS — N18.31 STAGE 3A CHRONIC KIDNEY DISEASE: ICD-10-CM

## 2021-06-11 DIAGNOSIS — Z12.31 ENCOUNTER FOR SCREENING MAMMOGRAM FOR MALIGNANT NEOPLASM OF BREAST: ICD-10-CM

## 2021-06-11 DIAGNOSIS — N18.31 STAGE 3A CHRONIC KIDNEY DISEASE: Primary | ICD-10-CM

## 2021-06-11 DIAGNOSIS — E11.8 CONTROLLED TYPE 2 DIABETES MELLITUS WITH COMPLICATION, WITHOUT LONG-TERM CURRENT USE OF INSULIN: ICD-10-CM

## 2021-06-11 LAB
ANION GAP SERPL CALC-SCNC: 13 MMOL/L (ref 8–16)
BUN SERPL-MCNC: 16 MG/DL (ref 8–23)
CALCIUM SERPL-MCNC: 9.6 MG/DL (ref 8.7–10.5)
CHLORIDE SERPL-SCNC: 96 MMOL/L (ref 95–110)
CO2 SERPL-SCNC: 21 MMOL/L (ref 23–29)
CREAT SERPL-MCNC: 1.2 MG/DL (ref 0.5–1.4)
EST. GFR  (AFRICAN AMERICAN): 51.8 ML/MIN/1.73 M^2
EST. GFR  (NON AFRICAN AMERICAN): 44.9 ML/MIN/1.73 M^2
GLUCOSE SERPL-MCNC: 91 MG/DL (ref 70–110)
POTASSIUM SERPL-SCNC: 4 MMOL/L (ref 3.5–5.1)
SODIUM SERPL-SCNC: 130 MMOL/L (ref 136–145)

## 2021-06-11 PROCEDURE — 36415 COLL VENOUS BLD VENIPUNCTURE: CPT | Performed by: FAMILY MEDICINE

## 2021-06-11 PROCEDURE — 99214 PR OFFICE/OUTPT VISIT, EST, LEVL IV, 30-39 MIN: ICD-10-PCS | Mod: S$GLB,,, | Performed by: FAMILY MEDICINE

## 2021-06-11 PROCEDURE — 3008F PR BODY MASS INDEX (BMI) DOCUMENTED: ICD-10-PCS | Mod: CPTII,S$GLB,, | Performed by: FAMILY MEDICINE

## 2021-06-11 PROCEDURE — 3051F HG A1C>EQUAL 7.0%<8.0%: CPT | Mod: CPTII,S$GLB,, | Performed by: FAMILY MEDICINE

## 2021-06-11 PROCEDURE — 1101F PR PT FALLS ASSESS DOC 0-1 FALLS W/OUT INJ PAST YR: ICD-10-PCS | Mod: CPTII,S$GLB,, | Performed by: FAMILY MEDICINE

## 2021-06-11 PROCEDURE — 99999 PR PBB SHADOW E&M-EST. PATIENT-LVL IV: CPT | Mod: PBBFAC,,, | Performed by: FAMILY MEDICINE

## 2021-06-11 PROCEDURE — 3288F PR FALLS RISK ASSESSMENT DOCUMENTED: ICD-10-PCS | Mod: CPTII,S$GLB,, | Performed by: FAMILY MEDICINE

## 2021-06-11 PROCEDURE — 99214 OFFICE O/P EST MOD 30 MIN: CPT | Mod: S$GLB,,, | Performed by: FAMILY MEDICINE

## 2021-06-11 PROCEDURE — 99499 RISK ADDL DX/OHS AUDIT: ICD-10-PCS | Mod: S$GLB,,, | Performed by: FAMILY MEDICINE

## 2021-06-11 PROCEDURE — 3051F PR MOST RECENT HEMOGLOBIN A1C LEVEL 7.0 - < 8.0%: ICD-10-PCS | Mod: CPTII,S$GLB,, | Performed by: FAMILY MEDICINE

## 2021-06-11 PROCEDURE — 1159F MED LIST DOCD IN RCRD: CPT | Mod: S$GLB,,, | Performed by: FAMILY MEDICINE

## 2021-06-11 PROCEDURE — 3008F BODY MASS INDEX DOCD: CPT | Mod: CPTII,S$GLB,, | Performed by: FAMILY MEDICINE

## 2021-06-11 PROCEDURE — 3288F FALL RISK ASSESSMENT DOCD: CPT | Mod: CPTII,S$GLB,, | Performed by: FAMILY MEDICINE

## 2021-06-11 PROCEDURE — 1101F PT FALLS ASSESS-DOCD LE1/YR: CPT | Mod: CPTII,S$GLB,, | Performed by: FAMILY MEDICINE

## 2021-06-11 PROCEDURE — 99999 PR PBB SHADOW E&M-EST. PATIENT-LVL IV: ICD-10-PCS | Mod: PBBFAC,,, | Performed by: FAMILY MEDICINE

## 2021-06-11 PROCEDURE — 80048 BASIC METABOLIC PNL TOTAL CA: CPT | Performed by: FAMILY MEDICINE

## 2021-06-11 PROCEDURE — 99499 UNLISTED E&M SERVICE: CPT | Mod: S$GLB,,, | Performed by: FAMILY MEDICINE

## 2021-06-11 PROCEDURE — 1159F PR MEDICATION LIST DOCUMENTED IN MEDICAL RECORD: ICD-10-PCS | Mod: S$GLB,,, | Performed by: FAMILY MEDICINE

## 2021-06-23 ENCOUNTER — HOSPITAL ENCOUNTER (OUTPATIENT)
Dept: RADIOLOGY | Facility: HOSPITAL | Age: 76
Discharge: HOME | End: 2021-06-23
Attending: FAMILY MEDICINE
Payer: MEDICARE

## 2021-06-23 VITALS — HEIGHT: 65 IN | WEIGHT: 176.38 LBS | BODY MASS INDEX: 29.38 KG/M2

## 2021-06-23 DIAGNOSIS — Z12.31 ENCOUNTER FOR SCREENING MAMMOGRAM FOR MALIGNANT NEOPLASM OF BREAST: ICD-10-CM

## 2021-06-23 PROCEDURE — 77067 MAMMO DIGITAL SCREENING BILAT WITH TOMO: ICD-10-PCS | Mod: 26,,, | Performed by: RADIOLOGY

## 2021-06-23 PROCEDURE — 77063 MAMMO DIGITAL SCREENING BILAT WITH TOMO: ICD-10-PCS | Mod: 26,,, | Performed by: RADIOLOGY

## 2021-06-23 PROCEDURE — 77063 BREAST TOMOSYNTHESIS BI: CPT | Mod: 26,,, | Performed by: RADIOLOGY

## 2021-06-23 PROCEDURE — 77067 SCR MAMMO BI INCL CAD: CPT | Mod: TC

## 2021-06-23 PROCEDURE — 77067 SCR MAMMO BI INCL CAD: CPT | Mod: 26,,, | Performed by: RADIOLOGY

## 2021-06-24 DIAGNOSIS — E87.1 HYPONATREMIA: Primary | ICD-10-CM

## 2021-07-08 ENCOUNTER — LAB VISIT (OUTPATIENT)
Dept: LAB | Facility: HOSPITAL | Age: 76
End: 2021-07-08
Attending: FAMILY MEDICINE
Payer: MEDICARE

## 2021-07-08 DIAGNOSIS — E87.1 HYPONATREMIA: ICD-10-CM

## 2021-07-08 LAB
ANION GAP SERPL CALC-SCNC: 10 MMOL/L (ref 8–16)
BUN SERPL-MCNC: 13 MG/DL (ref 8–23)
CALCIUM SERPL-MCNC: 9.8 MG/DL (ref 8.7–10.5)
CHLORIDE SERPL-SCNC: 101 MMOL/L (ref 95–110)
CO2 SERPL-SCNC: 24 MMOL/L (ref 23–29)
CREAT SERPL-MCNC: 1 MG/DL (ref 0.5–1.4)
EST. GFR  (AFRICAN AMERICAN): >60 ML/MIN/1.73 M^2
EST. GFR  (NON AFRICAN AMERICAN): 56 ML/MIN/1.73 M^2
GLUCOSE SERPL-MCNC: 109 MG/DL (ref 70–110)
POTASSIUM SERPL-SCNC: 4.2 MMOL/L (ref 3.5–5.1)
SODIUM SERPL-SCNC: 135 MMOL/L (ref 136–145)

## 2021-07-08 PROCEDURE — 36415 COLL VENOUS BLD VENIPUNCTURE: CPT | Performed by: FAMILY MEDICINE

## 2021-07-08 PROCEDURE — 80048 BASIC METABOLIC PNL TOTAL CA: CPT | Performed by: FAMILY MEDICINE

## 2021-07-28 DIAGNOSIS — E78.5 HYPERLIPIDEMIA, UNSPECIFIED HYPERLIPIDEMIA TYPE: ICD-10-CM

## 2021-07-28 DIAGNOSIS — I10 HYPERTENSION, UNSPECIFIED TYPE: Primary | ICD-10-CM

## 2021-07-29 RX ORDER — ATENOLOL 25 MG/1
25 TABLET ORAL DAILY
Qty: 90 TABLET | Refills: 1 | Status: SHIPPED | OUTPATIENT
Start: 2021-07-29 | End: 2021-12-28 | Stop reason: SDUPTHER

## 2021-09-10 ENCOUNTER — LAB VISIT (OUTPATIENT)
Dept: LAB | Facility: HOSPITAL | Age: 76
End: 2021-09-10
Attending: FAMILY MEDICINE
Payer: MEDICARE

## 2021-09-10 DIAGNOSIS — E11.8 CONTROLLED TYPE 2 DIABETES MELLITUS WITH COMPLICATION, WITHOUT LONG-TERM CURRENT USE OF INSULIN: ICD-10-CM

## 2021-09-10 DIAGNOSIS — E03.9 HYPOTHYROIDISM, UNSPECIFIED TYPE: ICD-10-CM

## 2021-09-10 LAB
ESTIMATED AVG GLUCOSE: 143 MG/DL (ref 68–131)
HBA1C MFR BLD: 6.6 % (ref 4–5.6)
TSH SERPL DL<=0.005 MIU/L-ACNC: 1.02 UIU/ML (ref 0.4–4)

## 2021-09-10 PROCEDURE — 83036 HEMOGLOBIN GLYCOSYLATED A1C: CPT | Performed by: FAMILY MEDICINE

## 2021-09-10 PROCEDURE — 36415 COLL VENOUS BLD VENIPUNCTURE: CPT | Performed by: FAMILY MEDICINE

## 2021-09-10 PROCEDURE — 84443 ASSAY THYROID STIM HORMONE: CPT | Performed by: FAMILY MEDICINE

## 2021-10-18 ENCOUNTER — TELEPHONE (OUTPATIENT)
Dept: INTERNAL MEDICINE | Facility: CLINIC | Age: 76
End: 2021-10-18

## 2021-11-30 RX ORDER — METFORMIN HYDROCHLORIDE 500 MG/1
500 TABLET ORAL 2 TIMES DAILY WITH MEALS
Qty: 180 TABLET | Refills: 1 | Status: SHIPPED | OUTPATIENT
Start: 2021-11-30 | End: 2022-05-25

## 2021-12-09 DIAGNOSIS — E11.8 CONTROLLED TYPE 2 DIABETES MELLITUS WITH COMPLICATION, WITHOUT LONG-TERM CURRENT USE OF INSULIN: Primary | ICD-10-CM

## 2021-12-13 RX ORDER — LANCETS
EACH MISCELLANEOUS
Qty: 200 EACH | Refills: 3 | Status: SHIPPED | OUTPATIENT
Start: 2021-12-13 | End: 2022-10-16 | Stop reason: SDUPTHER

## 2021-12-28 DIAGNOSIS — I10 HYPERTENSION, UNSPECIFIED TYPE: ICD-10-CM

## 2021-12-28 NOTE — TELEPHONE ENCOUNTER
No new care gaps identified.  Powered by [employer] by [employer]. Reference number: [id number].   12/28/2021 8:43:19 AM CST

## 2022-01-01 NOTE — TELEPHONE ENCOUNTER
.  Refill Routing Note   Medication(s) are not appropriate for processing by [facility] for the following reason(s):      - Drug-Disease Interaction (atenoloL and CKD (chronic kidney disease))    [facility] action(s):  Defer Medication-related problems identified: Drug-disease interaction     Medication Therapy Plan: Drug-Disease: atenoloL and CKD (chronic kidney disease); Defer to PCP  --->Care Gap information included in message below if applicable.   Medication reconciliation completed: No   Automatic Epic Generated Protocol Data:        Requested Prescriptions   Pending Prescriptions Disp Refills    atenoloL (TENORMIN) 25 MG tablet 90 tablet 1     Sig: Take 1 tablet (25 mg total) by mouth once daily.       Cardiovascular:  Beta Blockers Passed - 12/28/2021  8:47 AM        Passed - Patient is at least 18 years old        Passed - Last BP in normal range within 360 days.     BP Readings from Last 3 Encounters:   06/11/21 128/60   01/12/21 131/72   10/12/20 136/72              Passed - Last Heart Rate in normal range within 360 days.     Pulse Readings from Last 3 Encounters:   06/11/21 80   01/12/21 71   10/12/20 73             Passed - Office visit in past 12 months.     Recent Visits  Date Type Provider Dept   06/11/21 Office Visit Christ Yepez MD [facility] Internal Medicine   01/12/21 Office Visit Christ Yepez MD [facility] Internal Medicine   Showing recent visits within past 720 days and meeting all other requirements  Future Appointments  No visits were found meeting these conditions.  Showing future appointments within next 150 days and meeting all other requirements      Future Appointments              In 1 month Christ Yepez MD [facility] - Internal Medicine,  Medical                       Appointments  past 12m or future 3m with PCP    Date Provider   Last Visit   6/11/2021 Christ Yepez MD   Next Visit   2/21/2022 Christ Yepez MD   ED visits in past 90 days: 0        Note composed:7:47  PM 12/31/2021

## 2022-01-02 RX ORDER — ATENOLOL 25 MG/1
25 TABLET ORAL DAILY
Qty: 90 TABLET | Refills: 1 | Status: SHIPPED | OUTPATIENT
Start: 2022-01-02 | End: 2022-03-02 | Stop reason: SDUPTHER

## 2022-02-08 LAB
LEFT EYE DM RETINOPATHY: NEGATIVE
RIGHT EYE DM RETINOPATHY: NEGATIVE

## 2022-02-21 ENCOUNTER — OFFICE VISIT (OUTPATIENT)
Dept: INTERNAL MEDICINE | Facility: CLINIC | Age: 77
End: 2022-02-21
Payer: MEDICARE

## 2022-02-21 VITALS
BODY MASS INDEX: 30.01 KG/M2 | HEIGHT: 65 IN | DIASTOLIC BLOOD PRESSURE: 60 MMHG | HEART RATE: 86 BPM | WEIGHT: 180.13 LBS | TEMPERATURE: 99 F | OXYGEN SATURATION: 98 % | SYSTOLIC BLOOD PRESSURE: 148 MMHG

## 2022-02-21 DIAGNOSIS — E11.8 CONTROLLED TYPE 2 DIABETES MELLITUS WITH COMPLICATION, WITHOUT LONG-TERM CURRENT USE OF INSULIN: ICD-10-CM

## 2022-02-21 DIAGNOSIS — E03.9 HYPOTHYROIDISM, UNSPECIFIED TYPE: ICD-10-CM

## 2022-02-21 DIAGNOSIS — J30.9 ALLERGIC RHINITIS, UNSPECIFIED SEASONALITY, UNSPECIFIED TRIGGER: Primary | ICD-10-CM

## 2022-02-21 DIAGNOSIS — J30.9 ALLERGIC RHINITIS, UNSPECIFIED SEASONALITY, UNSPECIFIED TRIGGER: ICD-10-CM

## 2022-02-21 DIAGNOSIS — I10 HYPERTENSION, UNSPECIFIED TYPE: ICD-10-CM

## 2022-02-21 PROCEDURE — 3066F PR DOCUMENTATION OF TREATMENT FOR NEPHROPATHY: ICD-10-PCS | Mod: HCNC,CPTII,S$GLB, | Performed by: FAMILY MEDICINE

## 2022-02-21 PROCEDURE — 99214 PR OFFICE/OUTPT VISIT, EST, LEVL IV, 30-39 MIN: ICD-10-PCS | Mod: HCNC,S$GLB,, | Performed by: FAMILY MEDICINE

## 2022-02-21 PROCEDURE — 3061F PR NEG MICROALBUMINURIA RESULT DOCUMENTED/REVIEW: ICD-10-PCS | Mod: HCNC,CPTII,S$GLB, | Performed by: FAMILY MEDICINE

## 2022-02-21 PROCEDURE — 3288F FALL RISK ASSESSMENT DOCD: CPT | Mod: HCNC,CPTII,S$GLB, | Performed by: FAMILY MEDICINE

## 2022-02-21 PROCEDURE — 3078F PR MOST RECENT DIASTOLIC BLOOD PRESSURE < 80 MM HG: ICD-10-PCS | Mod: HCNC,CPTII,S$GLB, | Performed by: FAMILY MEDICINE

## 2022-02-21 PROCEDURE — 3044F HG A1C LEVEL LT 7.0%: CPT | Mod: HCNC,CPTII,S$GLB, | Performed by: FAMILY MEDICINE

## 2022-02-21 PROCEDURE — 1101F PT FALLS ASSESS-DOCD LE1/YR: CPT | Mod: HCNC,CPTII,S$GLB, | Performed by: FAMILY MEDICINE

## 2022-02-21 PROCEDURE — 3008F PR BODY MASS INDEX (BMI) DOCUMENTED: ICD-10-PCS | Mod: HCNC,CPTII,S$GLB, | Performed by: FAMILY MEDICINE

## 2022-02-21 PROCEDURE — 1159F PR MEDICATION LIST DOCUMENTED IN MEDICAL RECORD: ICD-10-PCS | Mod: HCNC,CPTII,S$GLB, | Performed by: FAMILY MEDICINE

## 2022-02-21 PROCEDURE — 3044F PR MOST RECENT HEMOGLOBIN A1C LEVEL <7.0%: ICD-10-PCS | Mod: HCNC,CPTII,S$GLB, | Performed by: FAMILY MEDICINE

## 2022-02-21 PROCEDURE — 99214 OFFICE O/P EST MOD 30 MIN: CPT | Mod: HCNC,S$GLB,, | Performed by: FAMILY MEDICINE

## 2022-02-21 PROCEDURE — 3288F PR FALLS RISK ASSESSMENT DOCUMENTED: ICD-10-PCS | Mod: HCNC,CPTII,S$GLB, | Performed by: FAMILY MEDICINE

## 2022-02-21 PROCEDURE — 3077F SYST BP >= 140 MM HG: CPT | Mod: HCNC,CPTII,S$GLB, | Performed by: FAMILY MEDICINE

## 2022-02-21 PROCEDURE — 3061F NEG MICROALBUMINURIA REV: CPT | Mod: HCNC,CPTII,S$GLB, | Performed by: FAMILY MEDICINE

## 2022-02-21 PROCEDURE — 99999 PR PBB SHADOW E&M-EST. PATIENT-LVL IV: ICD-10-PCS | Mod: PBBFAC,HCNC,, | Performed by: FAMILY MEDICINE

## 2022-02-21 PROCEDURE — 3077F PR MOST RECENT SYSTOLIC BLOOD PRESSURE >= 140 MM HG: ICD-10-PCS | Mod: HCNC,CPTII,S$GLB, | Performed by: FAMILY MEDICINE

## 2022-02-21 PROCEDURE — 99999 PR PBB SHADOW E&M-EST. PATIENT-LVL IV: CPT | Mod: PBBFAC,HCNC,, | Performed by: FAMILY MEDICINE

## 2022-02-21 PROCEDURE — 1159F MED LIST DOCD IN RCRD: CPT | Mod: HCNC,CPTII,S$GLB, | Performed by: FAMILY MEDICINE

## 2022-02-21 PROCEDURE — 3078F DIAST BP <80 MM HG: CPT | Mod: HCNC,CPTII,S$GLB, | Performed by: FAMILY MEDICINE

## 2022-02-21 PROCEDURE — 1101F PR PT FALLS ASSESS DOC 0-1 FALLS W/OUT INJ PAST YR: ICD-10-PCS | Mod: HCNC,CPTII,S$GLB, | Performed by: FAMILY MEDICINE

## 2022-02-21 PROCEDURE — 3008F BODY MASS INDEX DOCD: CPT | Mod: HCNC,CPTII,S$GLB, | Performed by: FAMILY MEDICINE

## 2022-02-21 PROCEDURE — 3066F NEPHROPATHY DOC TX: CPT | Mod: HCNC,CPTII,S$GLB, | Performed by: FAMILY MEDICINE

## 2022-02-21 RX ORDER — MONTELUKAST SODIUM 10 MG/1
10 TABLET ORAL NIGHTLY
Qty: 30 TABLET | Refills: 0 | Status: SHIPPED | OUTPATIENT
Start: 2022-02-21 | End: 2022-03-24

## 2022-02-21 RX ORDER — AZELASTINE 1 MG/ML
1 SPRAY, METERED NASAL 2 TIMES DAILY
Qty: 30 ML | Refills: 0 | Status: SHIPPED | OUTPATIENT
Start: 2022-02-21 | End: 2022-04-04

## 2022-02-21 NOTE — PROGRESS NOTES
Subjective:       Patient ID: Patsy Perez is a 76 y.o. female.    Chief Complaint: Follow-up    HPI    Patient Active Problem List   Diagnosis    Hypothyroidism    Hypertension    HLD (hyperlipidemia)    Gallstones    Fatty liver    DM II (diabetes mellitus, type II), controlled    CKD (chronic kidney disease)       Past Medical History:   Diagnosis Date    CKD (chronic kidney disease)     DM II (diabetes mellitus, type II), controlled     Fatty liver     Gallstones     found on US    HLD (hyperlipidemia)     Hypertension     Hypothyroidism     s/p thyroidectomy       Past Surgical History:   Procedure Laterality Date    APPENDECTOMY      1985    HYSTERECTOMY      2009 - Dr. [facility]    THYROIDECTOMY      1985       Family History   Problem Relation Age of Onset    Diabetes Mother     Breast cancer Mother         late 70s    Osteoarthritis Mother         double knee replacement    Heart disease Father        Social History     Tobacco Use   Smoking Status Former Smoker   Smokeless Tobacco Not on file       Wt Readings from Last 5 Encounters:   02/21/22 81.7 kg (180 lb 1.9 oz)   06/23/21 80 kg (176 lb 5.9 oz)   06/11/21 80.4 kg (177 lb 4 oz)   01/12/21 82.5 kg (181 lb 14.1 oz)   10/12/20 82.5 kg (181 lb 14.1 oz)       For further HPI details, see assessment and plan.    Review of Systems    Objective:      Vitals:    02/21/22 1529   BP: (!) 148/60   Pulse: 86   Temp: 98.5 °F (36.9 °C)     Protective Sensation (w/ 10 gram monofilament):  Right: Intact  Left: Intact    Visual Inspection:  Normal -  Bilateral    Pedal Pulses:   Right: Present  Left: Present    Posterior tibialis:   Right:Present  Left: Present      Physical Exam  Constitutional:       General: She is not in acute distress.     Appearance: Normal appearance. She is well-developed.   Cardiovascular:      Rate and Rhythm: Normal rate and regular rhythm.   Pulmonary:      Effort: Pulmonary effort is normal. No  respiratory distress.      Breath sounds: Normal breath sounds.   Musculoskeletal:         General: Normal range of motion.   Neurological:      Mental Status: She is alert. She is not disoriented.   Psychiatric:         Mood and Affect: Mood normal.         Speech: Speech normal.         Assessment:       1. Allergic rhinitis, unspecified seasonality, unspecified trigger    2. Controlled type 2 diabetes mellitus with complication, without long-term current use of insulin    3. Hypothyroidism, unspecified type    4. Hypertension, unspecified type        Plan:   Allergic rhinitis, unspecified seasonality, unspecified trigger  -     montelukast (SINGULAIR) 10 mg tablet; Take 1 tablet (10 mg total) by mouth every evening.  Dispense: 30 tablet; Refill: 0  -     azelastine (ASTELIN) 137 mcg (0.1 %) nasal spray; 1 spray (137 mcg total) by Nasal route 2 (two) times daily.  Dispense: 30 mL; Refill: 0    Controlled type 2 diabetes mellitus with complication, without long-term current use of insulin  -     Microalbumin/Creatinine Ratio, Urine; Future; Expected date: 02/21/2022  -     Hemoglobin A1C; Future; Expected date: 02/21/2022  -     Comprehensive Metabolic Panel; Future; Expected date: 02/21/2022  -     Lipid Panel; Future; Expected date: 02/21/2022    Hypothyroidism, unspecified type    Hypertension, unspecified type        Productive cough  Cough comes/goes  Sinus drainage is significant  Ongoing nasal congestion  2-3 weeks  No fever. No chills  Mostly just congestion and drainage.  Will treat as allergy issue  If not improving consider antibiotics.  -add astelin and singular to her flonase  If this fails to improve or worsens I want her to let me know and i'll call in antibiotics    DM II  Lab Results   Component Value Date    HGBA1C 6.8 (H) 02/22/2022     januvia 50 mg  amaryl 2 mg  metfroming 500     HTN  BP Readings from Last 3 Encounters:   02/21/22 (!) 148/60   06/11/21 128/60   01/12/21 131/72      Lisinopril-hctz  Amlodipine  Atenolol    Noting her pressure is elevated today  Before change it I would like to see a BP reading for the next few weeks  I ask her to log  Bring in her log and her machine  Her machine was checked recently - its her husbands - and was deemed accurate.  If she is consisently over 140/90 - plan to add more medication.          Hypothyroidism  Lab Results   Component Value Date    TSH 1.023 09/10/2021     Continue current dose of synthroid    lipids  Lab Results   Component Value Date    LDLCALC 52.8 (L) 02/22/2022    LDLCALC 52.8 (L) 02/22/2022     Pravastatin 80 mg  Lab Results   Component Value Date    ALT 23 02/22/2022    AST 19 02/22/2022    ALKPHOS 55 02/22/2022    BILITOT 0.4 02/22/2022     Continue statin therapy    Video visit in 2 week  Will go over lab

## 2022-02-21 NOTE — TELEPHONE ENCOUNTER
Care Due:                  Date            Visit Type   Department     Provider  --------------------------------------------------------------------------------                                EP -                              PRIMARY      [facility] INTER[facility]  Last Visit: 02-      [facility] ([facility]   MEDICINE       Christ Yepez                              ESTABLISHED                              PATIENT -    [facility] INTER[facility]  Next Visit: 03-      [facility]       Christ Yepez                                                            Last  Test          Frequency    Reason                     Performed    Due Date  --------------------------------------------------------------------------------    CMP.........  12 months..  pravastatin..............  05- 05-    HBA1C.......  6 months...  SITagliptin, glimepiride,   09-   03-                             metFORMIN................    Lipid Panel.  12 months..  pravastatin..............  10-   10-    Powered by [employer]. Reference number: [id number].   2/21/2022 3:58:04 PM CST

## 2022-02-22 ENCOUNTER — LAB VISIT (OUTPATIENT)
Dept: LAB | Facility: HOSPITAL | Age: 77
End: 2022-02-22
Attending: FAMILY MEDICINE
Payer: MEDICARE

## 2022-02-22 ENCOUNTER — PATIENT OUTREACH (OUTPATIENT)
Dept: ADMINISTRATIVE | Facility: HOSPITAL | Age: 77
End: 2022-02-22
Payer: MEDICARE

## 2022-02-22 DIAGNOSIS — E11.8 CONTROLLED TYPE 2 DIABETES MELLITUS WITH COMPLICATION, WITHOUT LONG-TERM CURRENT USE OF INSULIN: ICD-10-CM

## 2022-02-22 DIAGNOSIS — E78.5 HYPERLIPIDEMIA, UNSPECIFIED HYPERLIPIDEMIA TYPE: ICD-10-CM

## 2022-02-22 LAB
ALBUMIN SERPL BCP-MCNC: 3.5 G/DL (ref 3.5–5.2)
ALP SERPL-CCNC: 55 U/L (ref 55–135)
ALT SERPL W/O P-5'-P-CCNC: 23 U/L (ref 10–44)
ANION GAP SERPL CALC-SCNC: 9 MMOL/L (ref 8–16)
AST SERPL-CCNC: 19 U/L (ref 10–40)
BILIRUB SERPL-MCNC: 0.4 MG/DL (ref 0.1–1)
BUN SERPL-MCNC: 16 MG/DL (ref 8–23)
CALCIUM SERPL-MCNC: 10 MG/DL (ref 8.7–10.5)
CHLORIDE SERPL-SCNC: 99 MMOL/L (ref 95–110)
CHOLEST SERPL-MCNC: 133 MG/DL (ref 120–199)
CHOLEST SERPL-MCNC: 133 MG/DL (ref 120–199)
CHOLEST/HDLC SERPL: 2.6 {RATIO} (ref 2–5)
CHOLEST/HDLC SERPL: 2.6 {RATIO} (ref 2–5)
CO2 SERPL-SCNC: 24 MMOL/L (ref 23–29)
CREAT SERPL-MCNC: 1.1 MG/DL (ref 0.5–1.4)
EST. GFR  (AFRICAN AMERICAN): 57.2 ML/MIN/1.73 M^2
EST. GFR  (NON AFRICAN AMERICAN): 49.6 ML/MIN/1.73 M^2
ESTIMATED AVG GLUCOSE: 148 MG/DL (ref 68–131)
GLUCOSE SERPL-MCNC: 110 MG/DL (ref 70–110)
HBA1C MFR BLD: 6.8 % (ref 4–5.6)
HDLC SERPL-MCNC: 52 MG/DL (ref 40–75)
HDLC SERPL-MCNC: 52 MG/DL (ref 40–75)
HDLC SERPL: 39.1 % (ref 20–50)
HDLC SERPL: 39.1 % (ref 20–50)
LDLC SERPL CALC-MCNC: 52.8 MG/DL (ref 63–159)
LDLC SERPL CALC-MCNC: 52.8 MG/DL (ref 63–159)
NONHDLC SERPL-MCNC: 81 MG/DL
NONHDLC SERPL-MCNC: 81 MG/DL
POTASSIUM SERPL-SCNC: 4.7 MMOL/L (ref 3.5–5.1)
PROT SERPL-MCNC: 7.1 G/DL (ref 6–8.4)
SODIUM SERPL-SCNC: 132 MMOL/L (ref 136–145)
TRIGL SERPL-MCNC: 141 MG/DL (ref 30–150)
TRIGL SERPL-MCNC: 141 MG/DL (ref 30–150)

## 2022-02-22 PROCEDURE — 80053 COMPREHEN METABOLIC PANEL: CPT | Mod: HCNC | Performed by: FAMILY MEDICINE

## 2022-02-22 PROCEDURE — 83036 HEMOGLOBIN GLYCOSYLATED A1C: CPT | Mod: HCNC | Performed by: FAMILY MEDICINE

## 2022-02-22 PROCEDURE — 80061 LIPID PANEL: CPT | Mod: HCNC | Performed by: FAMILY MEDICINE

## 2022-02-22 PROCEDURE — 36415 COLL VENOUS BLD VENIPUNCTURE: CPT | Mod: HCNC | Performed by: FAMILY MEDICINE

## 2022-02-23 RX ORDER — MONTELUKAST SODIUM 10 MG/1
TABLET ORAL
Qty: 90 TABLET | OUTPATIENT
Start: 2022-02-23

## 2022-02-23 NOTE — TELEPHONE ENCOUNTER
Quick DC. Request already responded to by other means (e.g. phone or fax)   Refill Authorization Note   Patsy Perez  is requesting a refill authorization.  Brief Assessment and Rationale for Refill:  Quick Discontinue  Medication Therapy Plan:       Medication Reconciliation Completed:  No      Comments:   Pended Medication(s)       Requested Prescriptions     Refused Prescriptions Disp Refills    montelukast (SINGULAIR) 10 mg tablet [Pharmacy Med Name: MONTELUKAST 10MG TABLETS] 90 tablet      Sig: TAKE 1 TABLET(10 MG) BY MOUTH EVERY EVENING     Refused By: SANDRO DAVISON     Reason for Refusal: Request already responded to by other means (e.g. phone or fax)        Duplicate Pended Encounter(s)/ Last Prescribed Details: (includes pharmacy & prescriber details)   Ordering User:  Christ Yepez MD            Pharmacy    [id number] - [place] - [address] AT [facility]   [address], [facility] [address]   Phone:  [phone]  Fax:  [phone]   JERICA #:  [id number]   MARTHA Reason: --       Outpatient Medication Detail     Disp Refills Start End MARTHA   montelukast (SINGULAIR) 10 mg tablet 30 tablet 0 2/21/2022 3/23/2022 --   Sig - Route: Take 1 tablet (10 mg total) by mouth every evening. - Oral   Sent to pharmacy as: montelukast (SINGULAIR) 10 mg tablet   Class: Normal   Order: [id number]   Date/Time Signed: 2/21/2022 15:35       E-Prescribing Status: Receipt confirmed by pharmacy (2/21/2022  3:36 PM CST)       Ordering Encounter Report    Associated Reports   View Encounter                Note composed:12:52 PM 02/23/2022

## 2022-03-07 ENCOUNTER — OFFICE VISIT (OUTPATIENT)
Dept: INTERNAL MEDICINE | Facility: CLINIC | Age: 77
End: 2022-03-07
Payer: MEDICARE

## 2022-03-07 VITALS — SYSTOLIC BLOOD PRESSURE: 135 MMHG | DIASTOLIC BLOOD PRESSURE: 58 MMHG

## 2022-03-07 DIAGNOSIS — N18.31 STAGE 3A CHRONIC KIDNEY DISEASE: ICD-10-CM

## 2022-03-07 DIAGNOSIS — I10 HYPERTENSION, UNSPECIFIED TYPE: Primary | ICD-10-CM

## 2022-03-07 DIAGNOSIS — E11.8 CONTROLLED TYPE 2 DIABETES MELLITUS WITH COMPLICATION, WITHOUT LONG-TERM CURRENT USE OF INSULIN: ICD-10-CM

## 2022-03-07 DIAGNOSIS — E03.9 HYPOTHYROIDISM, UNSPECIFIED TYPE: ICD-10-CM

## 2022-03-07 PROCEDURE — 3044F HG A1C LEVEL LT 7.0%: CPT | Mod: CPTII,95,, | Performed by: FAMILY MEDICINE

## 2022-03-07 PROCEDURE — 3075F PR MOST RECENT SYSTOLIC BLOOD PRESS GE 130-139MM HG: ICD-10-PCS | Mod: CPTII,95,, | Performed by: FAMILY MEDICINE

## 2022-03-07 PROCEDURE — 99214 OFFICE O/P EST MOD 30 MIN: CPT | Mod: 95,,, | Performed by: FAMILY MEDICINE

## 2022-03-07 PROCEDURE — 3044F PR MOST RECENT HEMOGLOBIN A1C LEVEL <7.0%: ICD-10-PCS | Mod: CPTII,95,, | Performed by: FAMILY MEDICINE

## 2022-03-07 PROCEDURE — 99499 UNLISTED E&M SERVICE: CPT | Mod: HCNC,95,, | Performed by: FAMILY MEDICINE

## 2022-03-07 PROCEDURE — 3075F SYST BP GE 130 - 139MM HG: CPT | Mod: CPTII,95,, | Performed by: FAMILY MEDICINE

## 2022-03-07 PROCEDURE — 99499 RISK ADDL DX/OHS AUDIT: ICD-10-PCS | Mod: HCNC,95,, | Performed by: FAMILY MEDICINE

## 2022-03-07 PROCEDURE — 3078F DIAST BP <80 MM HG: CPT | Mod: CPTII,95,, | Performed by: FAMILY MEDICINE

## 2022-03-07 PROCEDURE — 99214 PR OFFICE/OUTPT VISIT, EST, LEVL IV, 30-39 MIN: ICD-10-PCS | Mod: 95,,, | Performed by: FAMILY MEDICINE

## 2022-03-07 PROCEDURE — 3078F PR MOST RECENT DIASTOLIC BLOOD PRESSURE < 80 MM HG: ICD-10-PCS | Mod: CPTII,95,, | Performed by: FAMILY MEDICINE

## 2022-03-07 PROCEDURE — 3061F NEG MICROALBUMINURIA REV: CPT | Mod: CPTII,95,, | Performed by: FAMILY MEDICINE

## 2022-03-07 PROCEDURE — 3066F PR DOCUMENTATION OF TREATMENT FOR NEPHROPATHY: ICD-10-PCS | Mod: CPTII,95,, | Performed by: FAMILY MEDICINE

## 2022-03-07 PROCEDURE — 3066F NEPHROPATHY DOC TX: CPT | Mod: CPTII,95,, | Performed by: FAMILY MEDICINE

## 2022-03-07 PROCEDURE — 3061F PR NEG MICROALBUMINURIA RESULT DOCUMENTED/REVIEW: ICD-10-PCS | Mod: CPTII,95,, | Performed by: FAMILY MEDICINE

## 2022-03-07 NOTE — PROGRESS NOTES
Subjective:       Patient ID: Patsy Perez is a 76 y.o. female.    Chief Complaint: No chief complaint on file.    HPI   The patient location is: home  The chief complaint leading to consultation is: htn    Visit type: audiovisual    Face to Face time with patient: 15   minutes of total time spent on the encounter, which includes face to face time and non-face to face time preparing to see the patient (eg, review of tests), Obtaining and/or reviewing separately obtained history, Documenting clinical information in the electronic or other health record, Independently interpreting results (not separately reported) and communicating results to the patient/family/caregiver, or Care coordination (not separately reported).         Each patient to whom he or she provides medical services by telemedicine is:  (1) informed of the relationship between the physician and patient and the respective role of any other health care provider with respect to management of the patient; and (2) notified that he or she may decline to receive medical services by telemedicine and may withdraw from such care at any time.    Notes:       Patient Active Problem List   Diagnosis    Hypothyroidism    Hypertension    HLD (hyperlipidemia)    Gallstones    Fatty liver    DM II (diabetes mellitus, type II), controlled    CKD (chronic kidney disease)       Past Medical History:   Diagnosis Date    CKD (chronic kidney disease)     DM II (diabetes mellitus, type II), controlled     Fatty liver     Gallstones     found on US    HLD (hyperlipidemia)     Hypertension     Hypothyroidism     s/p thyroidectomy       Past Surgical History:   Procedure Laterality Date    APPENDECTOMY      1985    HYSTERECTOMY      2009 - [facility]    THYROIDECTOMY      1985       Family History   Problem Relation Age of Onset    Diabetes Mother     Breast cancer Mother         late 70s    Osteoarthritis Mother         double knee  replacement    Heart disease Father        Social History     Tobacco Use   Smoking Status Former Smoker   Smokeless Tobacco Not on file       Wt Readings from Last 5 Encounters:   02/21/22 81.7 kg (180 lb 1.9 oz)   06/23/21 80 kg (176 lb 5.9 oz)   06/11/21 80.4 kg (177 lb 4 oz)   01/12/21 82.5 kg (181 lb 14.1 oz)   10/12/20 82.5 kg (181 lb 14.1 oz)       For further HPI details, see assessment and plan.     Review of Systems   Respiratory: Negative for shortness of breath.    Cardiovascular: Negative for chest pain and palpitations.   Musculoskeletal: Negative for neck pain.   Neurological: Negative for headaches.       Objective:      Vitals:    03/07/22 1142   BP: (!) 135/58       Physical Exam  Constitutional:       General: She is not in acute distress.     Appearance: Normal appearance. She is well-developed.   Cardiovascular:      Rate and Rhythm: Normal rate and regular rhythm.   Pulmonary:      Effort: Pulmonary effort is normal. No respiratory distress.      Breath sounds: Normal breath sounds.   Musculoskeletal:         General: Normal range of motion.   Neurological:      Mental Status: She is alert. She is not disoriented.   Psychiatric:         Mood and Affect: Mood normal.         Speech: Speech normal.         Assessment:       1. Hypertension, unspecified type    2. Controlled type 2 diabetes mellitus with complication, without long-term current use of insulin    3. Hypothyroidism, unspecified type    4. Stage 3a chronic kidney disease        Plan:   Hypertension, unspecified type    Controlled type 2 diabetes mellitus with complication, without long-term current use of insulin    Hypothyroidism, unspecified type    Stage 3a chronic kidney disease      HTN  Has logged daily - on a trusted machine - we had checked in past.  134//70  Mostly around 143-146 systolic  Not controlled    -lisinopril -hctz 20-12.5 mg ( she takes 2 to equal 40-25mg )  -Atenolol 25 mg  -Amlodipine 10 mg    Has cut  salt  Feels diet is reasonable    Not exercising like she should    Today we'll double atenolol from 25 to 50 mg  Her HR stays in 70-80s.        CKD III  Reviewed lab w patient  Avoid nsaids  Improve pressure control  We maybe consult nephrology if pressure is difficult to improve    DM II  At goal  Controlled  Continue meds  Return to exercise as discussed  LDL at goal.    Hyponatremia mild. If her pressure stays hard to control we will consult nephrology     4 weeks f/u for repeat check on pressure

## 2022-03-24 DIAGNOSIS — J30.9 ALLERGIC RHINITIS, UNSPECIFIED SEASONALITY, UNSPECIFIED TRIGGER: ICD-10-CM

## 2022-03-24 RX ORDER — MONTELUKAST SODIUM 10 MG/1
TABLET ORAL
Qty: 30 TABLET | Refills: 0 | Status: SHIPPED | OUTPATIENT
Start: 2022-03-24 | End: 2022-03-24

## 2022-03-24 NOTE — TELEPHONE ENCOUNTER
Refill Routing Note   Medication(s) are not appropriate for processing by [facility] for the following reason(s):      - Medication is a new start (<3 months)    [facility] action(s):  Defer          --->Care Gap information included in message below if applicable.   Medication reconciliation completed: No   Automatic Epic Generated Protocol Data:        Requested Prescriptions   Pending Prescriptions Disp Refills    montelukast (SINGULAIR) 10 mg tablet [Pharmacy Med Name: MONTELUKAST 10MG TABLETS] 30 tablet 0     Sig: TAKE 1 TABLET(10 MG) BY MOUTH EVERY EVENING        Pulmonology:  Leukotriene Inhibitors Passed - 3/24/2022 11:46 AM        Passed - Patient is at least 18 years old        Passed - Valid encounter within last 15 months     Recent Visits  Date Type Provider Dept   03/07/22 Office Visit Christ Yepez MD [facility] Internal Medicine   02/21/22 Office Visit Christ Yepez MD [facility] Internal Medicine   06/11/21 Office Visit Christ Yepez MD [facility] Internal Medicine   01/12/21 Office Visit Christ Yepez MD [facility] Internal Medicine   Showing recent visits within past 720 days and meeting all other requirements  Future Appointments  No visits were found meeting these conditions.  Showing future appointments within next 150 days and meeting all other requirements        Future Appointments                In 1 week MD MAX VazquezMerino - Internal Medicine, [facility]             Passed - Matches previous order         Previous Authorizing Provider: Christ Yepez MD (montelukast (SINGULAIR) 10 mg tablet)  Previous Pharmacy: [id number] [place], [place] - [address] AT [facility]            Passed - No ED/Hospital visits since last PCP visit     Last PCP Visit: 3/7/2022 Last Admission:  Last ED Visit:                  Appointments  past 12m or future 3m with PCP    Date Provider   Last Visit   3/7/2022 Christ Yepez MD   Next Visit   4/4/2022 Christ  CARRINGTON Yepez MD   ED visits in past 90 days: 0        Note composed:12:10 PM 03/24/2022

## 2022-03-24 NOTE — TELEPHONE ENCOUNTER
No new care gaps identified.  Powered by [employer]. Reference number: [id number].   3/24/2022 11:46:51 AM CDT

## 2022-04-04 ENCOUNTER — OFFICE VISIT (OUTPATIENT)
Dept: INTERNAL MEDICINE | Facility: CLINIC | Age: 77
End: 2022-04-04
Payer: MEDICARE

## 2022-04-04 DIAGNOSIS — I10 HYPERTENSION, UNSPECIFIED TYPE: Primary | ICD-10-CM

## 2022-04-04 DIAGNOSIS — E11.8 CONTROLLED TYPE 2 DIABETES MELLITUS WITH COMPLICATION, WITHOUT LONG-TERM CURRENT USE OF INSULIN: ICD-10-CM

## 2022-04-04 DIAGNOSIS — J30.9 ALLERGIC RHINITIS, UNSPECIFIED SEASONALITY, UNSPECIFIED TRIGGER: ICD-10-CM

## 2022-04-04 PROCEDURE — 3044F PR MOST RECENT HEMOGLOBIN A1C LEVEL <7.0%: ICD-10-PCS | Mod: CPTII,95,, | Performed by: FAMILY MEDICINE

## 2022-04-04 PROCEDURE — 99214 OFFICE O/P EST MOD 30 MIN: CPT | Mod: 95,,, | Performed by: FAMILY MEDICINE

## 2022-04-04 PROCEDURE — 3044F HG A1C LEVEL LT 7.0%: CPT | Mod: CPTII,95,, | Performed by: FAMILY MEDICINE

## 2022-04-04 PROCEDURE — 99214 PR OFFICE/OUTPT VISIT, EST, LEVL IV, 30-39 MIN: ICD-10-PCS | Mod: 95,,, | Performed by: FAMILY MEDICINE

## 2022-04-04 PROCEDURE — 3066F PR DOCUMENTATION OF TREATMENT FOR NEPHROPATHY: ICD-10-PCS | Mod: CPTII,95,, | Performed by: FAMILY MEDICINE

## 2022-04-04 PROCEDURE — 3061F PR NEG MICROALBUMINURIA RESULT DOCUMENTED/REVIEW: ICD-10-PCS | Mod: CPTII,95,, | Performed by: FAMILY MEDICINE

## 2022-04-04 PROCEDURE — 3066F NEPHROPATHY DOC TX: CPT | Mod: CPTII,95,, | Performed by: FAMILY MEDICINE

## 2022-04-04 PROCEDURE — 3061F NEG MICROALBUMINURIA REV: CPT | Mod: CPTII,95,, | Performed by: FAMILY MEDICINE

## 2022-04-04 RX ORDER — ATENOLOL 50 MG/1
50 TABLET ORAL DAILY
Qty: 90 TABLET | Refills: 3 | Status: SHIPPED | OUTPATIENT
Start: 2022-04-04 | End: 2022-10-16 | Stop reason: SDUPTHER

## 2022-04-04 NOTE — PROGRESS NOTES
Subjective:       Patient ID: Patsy Perez is a 76 y.o. female.    Chief Complaint: No chief complaint on file.    HPI    Patient Active Problem List   Diagnosis    Hypothyroidism    Hypertension    HLD (hyperlipidemia)    Gallstones    Fatty liver    DM II (diabetes mellitus, type II), controlled    CKD (chronic kidney disease)       Past Medical History:   Diagnosis Date    CKD (chronic kidney disease)     DM II (diabetes mellitus, type II), controlled     Fatty liver     Gallstones     found on US    HLD (hyperlipidemia)     Hypertension     Hypothyroidism     s/p thyroidectomy       Past Surgical History:   Procedure Laterality Date    APPENDECTOMY      1985    HYSTERECTOMY      2009 - [place]    THYROIDECTOMY      1985       Family History   Problem Relation Age of Onset    Diabetes Mother     Breast cancer Mother         late 70s    Osteoarthritis Mother         double knee replacement    Heart disease Father        Social History     Tobacco Use   Smoking Status Former Smoker   Smokeless Tobacco Not on file       Wt Readings from Last 5 Encounters:   02/21/22 81.7 kg (180 lb 1.9 oz)   06/23/21 80 kg (176 lb 5.9 oz)   06/11/21 80.4 kg (177 lb 4 oz)   01/12/21 82.5 kg (181 lb 14.1 oz)   10/12/20 82.5 kg (181 lb 14.1 oz)       For further HPI details, see assessment and plan.    Review of Systems    Objective:      There were no vitals filed for this visit.    Physical Exam    Assessment:       1. Hypertension, unspecified type    2. Allergic rhinitis, unspecified seasonality, unspecified trigger    3. Controlled type 2 diabetes mellitus with complication, without long-term current use of insulin        Plan:   Hypertension, unspecified type    Allergic rhinitis, unspecified seasonality, unspecified trigger    Controlled type 2 diabetes mellitus with complication, without long-term current use of insulin    Other orders  -     atenoloL (TENORMIN) 50 MG tablet;  "Take 1 tablet (50 mg total) by mouth once daily.  Dispense: 90 tablet; Refill: 3        HTN  She is logging  Accurate machine  127/56 lowest  Highest 139/69  HR stayed 58-73  Feeling fine " great"  Has increased her exercise [place]  Will deem controlled  Continue current meds    Lisinopril hctz 40-25  Atenolol 50 mg  amlodipine 10 mg    DM II  Lab Results   Component Value Date    HGBA1C 6.8 (H) 02/22/2022   controlled    Lab Results   Component Value Date    LDLCALC 52.8 (L) 02/22/2022    LDLCALC 52.8 (L) 02/22/2022     Controlled lipids      Allergic rhinitis  Chronic issue  controlledDoing well  Didn't need meds  Will remove allergy meds from chart    4-5 month f/u       "

## 2022-04-21 RX ORDER — PRAVASTATIN SODIUM 80 MG/1
TABLET ORAL
Qty: 90 TABLET | Refills: 3 | Status: SHIPPED | OUTPATIENT
Start: 2022-04-21 | End: 2022-10-16 | Stop reason: SDUPTHER

## 2022-04-21 NOTE — TELEPHONE ENCOUNTER
No new care gaps identified.  Powered by [employer] Reference number: [id number].   4/21/2022 2:36:40 PM CDT

## 2022-04-21 NOTE — TELEPHONE ENCOUNTER
No new care gaps identified.  Powered by [employer] by [employer]. Reference number: [id number].   4/21/2022 2:36:05 PM CDT

## 2022-04-22 RX ORDER — GLIMEPIRIDE 2 MG/1
TABLET ORAL
Qty: 90 TABLET | Refills: 0 | Status: SHIPPED | OUTPATIENT
Start: 2022-04-22 | End: 2022-10-13

## 2022-04-22 RX ORDER — SITAGLIPTIN 50 MG/1
TABLET, FILM COATED ORAL
Qty: 90 TABLET | Refills: 0 | Status: SHIPPED | OUTPATIENT
Start: 2022-04-22 | End: 2022-07-20 | Stop reason: SDUPTHER

## 2022-04-22 NOTE — TELEPHONE ENCOUNTER
Refill Routing Note   Medication(s) are not appropriate for processing by [facility] for the following reason(s):      - Required laboratory values are abnormal    [facility] action(s):  Defer          Medication reconciliation completed: No     Appointments  past 12m or future 3m with PCP    Date Provider   Last Visit   Visit date not found Christ Yepez   Next Visit   Visit date not found Christ Yepez   ED visits in past 90 days: 0        Note composed:10:44 PM 04/21/2022

## 2022-04-22 NOTE — TELEPHONE ENCOUNTER
Refill Authorization Note   Patsy Perez  is requesting a refill authorization.  Brief Assessment and Rationale for Refill:  Approve     Medication Therapy Plan:       Medication Reconciliation Completed: No   Comments:     No Care Gaps recommended.     Note composed:10:19 PM 04/21/2022

## 2022-05-24 NOTE — TELEPHONE ENCOUNTER
Care Due:                  Date            Visit Type   Department     Provider  --------------------------------------------------------------------------------                                ESTABLISHED                              PATIENT -    [facility]  Last Visit: 04-      [facility]      MEDICINE       Christ Yepez                               -                              PRIMARY      [facility]  Next Visit: 09-      CARE ([facility])   MEDICINE       Christ Yepez                                                            Last  Test          Frequency    Reason                     Performed    Due Date  --------------------------------------------------------------------------------    HBA1C.......  6 months...  taiwo FISCHER,      02- 08-                             metFORMIN................    [employer] Embedded Care Gaps. Reference number: [id number]. 5/24/2022   11:17:31 AM CDT

## 2022-05-25 RX ORDER — METFORMIN HYDROCHLORIDE 500 MG/1
TABLET ORAL
Qty: 180 TABLET | Refills: 0 | Status: SHIPPED | OUTPATIENT
Start: 2022-05-25 | End: 2022-08-23

## 2022-05-25 NOTE — TELEPHONE ENCOUNTER
Refill Routing Note   Medication(s) are not appropriate for processing by [facility] for the following reason(s):      - Drug-Disease Interaction (metFORMIN and Fatty liver; CKD)    [facility] action(s):  Defer Medication-related problems identified:   Requires labs  Drug-disease interaction        Medication reconciliation completed: No     Appointments  past 12m or future 3m with PCP    Date Provider   Last Visit   4/4/2022 Christ Yepez MD   Next Visit   9/8/2022 Christ Yepez MD   ED visits in past 90 days: 0        Note composed:9:25 PM 05/24/2022

## 2022-06-03 RX ORDER — LEVOTHYROXINE SODIUM 75 UG/1
TABLET ORAL
Qty: 90 TABLET | Refills: 1 | Status: SHIPPED | OUTPATIENT
Start: 2022-06-03 | End: 2022-10-16 | Stop reason: SDUPTHER

## 2022-06-04 NOTE — TELEPHONE ENCOUNTER
No new care gaps identified.  [facility] Embedded Care Gaps. Reference number: [id number]. 6/03/2022   7:06:01 PM CDT

## 2022-06-04 NOTE — TELEPHONE ENCOUNTER
Refill Authorization Note   Patsy Perez  is requesting a refill authorization.  Brief Assessment and Rationale for Refill:  Approve     Medication Therapy Plan:       Medication Reconciliation Completed: No   Comments:     No Care Gaps recommended.     Note composed:10:09 PM 06/03/2022

## 2022-06-08 ENCOUNTER — HOSPITAL ENCOUNTER (EMERGENCY)
Facility: HOSPITAL | Age: 77
Discharge: HOME | End: 2022-06-08
Attending: EMERGENCY MEDICINE
Payer: MEDICARE

## 2022-06-08 ENCOUNTER — PATIENT MESSAGE (OUTPATIENT)
Dept: HEPATOLOGY | Facility: CLINIC | Age: 77
End: 2022-06-08
Payer: MEDICARE

## 2022-06-08 VITALS
OXYGEN SATURATION: 97 % | RESPIRATION RATE: 15 BRPM | WEIGHT: 176.5 LBS | SYSTOLIC BLOOD PRESSURE: 135 MMHG | TEMPERATURE: 98 F | HEART RATE: 60 BPM | DIASTOLIC BLOOD PRESSURE: 63 MMHG | HEIGHT: 65 IN | BODY MASS INDEX: 29.41 KG/M2

## 2022-06-08 DIAGNOSIS — R10.9 ABDOMINAL PAIN: ICD-10-CM

## 2022-06-08 DIAGNOSIS — R10.11 RIGHT UPPER QUADRANT PAIN: ICD-10-CM

## 2022-06-08 LAB
ALBUMIN SERPL BCP-MCNC: 3.7 G/DL (ref 3.5–5.2)
ALP SERPL-CCNC: 52 U/L (ref 55–135)
ALT SERPL W/O P-5'-P-CCNC: 23 U/L (ref 10–44)
ANION GAP SERPL CALC-SCNC: 12 MMOL/L (ref 8–16)
AST SERPL-CCNC: 21 U/L (ref 10–40)
BASOPHILS # BLD AUTO: 0.08 K/UL (ref 0–0.2)
BASOPHILS NFR BLD: 0.8 % (ref 0–1.9)
BILIRUB SERPL-MCNC: 0.4 MG/DL (ref 0.1–1)
BILIRUB UR QL STRIP: NEGATIVE
BUN SERPL-MCNC: 18 MG/DL (ref 8–23)
CALCIUM SERPL-MCNC: 9.2 MG/DL (ref 8.7–10.5)
CHLORIDE SERPL-SCNC: 103 MMOL/L (ref 95–110)
CLARITY UR: CLEAR
CO2 SERPL-SCNC: 20 MMOL/L (ref 23–29)
COLOR UR: YELLOW
CREAT SERPL-MCNC: 1.4 MG/DL (ref 0.5–1.4)
DIFFERENTIAL METHOD: NORMAL
EOSINOPHIL # BLD AUTO: 0.1 K/UL (ref 0–0.5)
EOSINOPHIL NFR BLD: 1.1 % (ref 0–8)
ERYTHROCYTE [DISTWIDTH] IN BLOOD BY AUTOMATED COUNT: 13.7 % (ref 11.5–14.5)
EST. GFR  (AFRICAN AMERICAN): 43 ML/MIN/1.73 M^2
EST. GFR  (NON AFRICAN AMERICAN): 37 ML/MIN/1.73 M^2
GLUCOSE SERPL-MCNC: 145 MG/DL (ref 70–110)
GLUCOSE UR QL STRIP: NEGATIVE
HCT VFR BLD AUTO: 40 % (ref 37–48.5)
HGB BLD-MCNC: 12.9 G/DL (ref 12–16)
HGB UR QL STRIP: NEGATIVE
IMM GRANULOCYTES # BLD AUTO: 0.03 K/UL (ref 0–0.04)
IMM GRANULOCYTES NFR BLD AUTO: 0.3 % (ref 0–0.5)
KETONES UR QL STRIP: NEGATIVE
LEUKOCYTE ESTERASE UR QL STRIP: NEGATIVE
LIPASE SERPL-CCNC: 124 U/L (ref 4–60)
LYMPHOCYTES # BLD AUTO: 2.2 K/UL (ref 1–4.8)
LYMPHOCYTES NFR BLD: 22.2 % (ref 18–48)
MCH RBC QN AUTO: 28.4 PG (ref 27–31)
MCHC RBC AUTO-ENTMCNC: 32.3 G/DL (ref 32–36)
MCV RBC AUTO: 88 FL (ref 82–98)
MONOCYTES # BLD AUTO: 0.7 K/UL (ref 0.3–1)
MONOCYTES NFR BLD: 7.6 % (ref 4–15)
NEUTROPHILS # BLD AUTO: 6.6 K/UL (ref 1.8–7.7)
NEUTROPHILS NFR BLD: 68 % (ref 38–73)
NITRITE UR QL STRIP: NEGATIVE
NRBC BLD-RTO: 0 /100 WBC
PH UR STRIP: 6 [PH] (ref 5–8)
PLATELET # BLD AUTO: 300 K/UL (ref 150–450)
PMV BLD AUTO: 10.8 FL (ref 9.2–12.9)
POTASSIUM SERPL-SCNC: 3.7 MMOL/L (ref 3.5–5.1)
PROT SERPL-MCNC: 7.3 G/DL (ref 6–8.4)
PROT UR QL STRIP: NEGATIVE
RBC # BLD AUTO: 4.54 M/UL (ref 4–5.4)
SODIUM SERPL-SCNC: 135 MMOL/L (ref 136–145)
SP GR UR STRIP: 1.01 (ref 1–1.03)
TROPONIN I SERPL DL<=0.01 NG/ML-MCNC: <0.006 NG/ML (ref 0–0.03)
URN SPEC COLLECT METH UR: NORMAL
UROBILINOGEN UR STRIP-ACNC: NEGATIVE EU/DL
WBC # BLD AUTO: 9.76 K/UL (ref 3.9–12.7)

## 2022-06-08 PROCEDURE — 99285 EMERGENCY DEPT VISIT HI MDM: CPT | Mod: 25

## 2022-06-08 PROCEDURE — 85025 COMPLETE CBC W/AUTO DIFF WBC: CPT | Performed by: EMERGENCY MEDICINE

## 2022-06-08 PROCEDURE — 83690 ASSAY OF LIPASE: CPT | Performed by: EMERGENCY MEDICINE

## 2022-06-08 PROCEDURE — 80053 COMPREHEN METABOLIC PANEL: CPT | Performed by: EMERGENCY MEDICINE

## 2022-06-08 PROCEDURE — 84484 ASSAY OF TROPONIN QUANT: CPT | Performed by: EMERGENCY MEDICINE

## 2022-06-08 PROCEDURE — 36000 PLACE NEEDLE IN VEIN: CPT

## 2022-06-08 PROCEDURE — 93010 ELECTROCARDIOGRAM REPORT: CPT | Mod: ,,, | Performed by: INTERNAL MEDICINE

## 2022-06-08 PROCEDURE — 93005 ELECTROCARDIOGRAM TRACING: CPT

## 2022-06-08 PROCEDURE — 81003 URINALYSIS AUTO W/O SCOPE: CPT | Performed by: EMERGENCY MEDICINE

## 2022-06-08 PROCEDURE — 93010 EKG 12-LEAD: ICD-10-PCS | Mod: ,,, | Performed by: INTERNAL MEDICINE

## 2022-06-08 RX ORDER — FAMOTIDINE 20 MG/1
20 TABLET, FILM COATED ORAL DAILY
Qty: 30 TABLET | Refills: 0 | Status: SHIPPED | OUTPATIENT
Start: 2022-06-08 | End: 2022-08-10 | Stop reason: SDUPTHER

## 2022-06-08 NOTE — ED PROVIDER NOTES
"SCRIBE #1 NOTE: I, Mi Jimenez, am scribing for, and in the presence of, Christy Figueroa DO. I have scribed the entire note.       History     Chief Complaint   Patient presents with    Abdominal Pain     RUQ pain x "a couple weeks"; hx of gallstones     Review of patient's allergies indicates:  No Known Allergies      History of Present Illness     HPI    6/8/2022, 10:06 AM  History obtained from the patient      History of Present Illness: Patsy Perez is a 76 y.o. female patient with a PMHx of CKD, DM II, gallstones, HTN, and HLD who presents to the Emergency Department for evaluation of RUQ abdominal pain which onset gradually two weeks ago. Patient ate fried dishes two weeks ago and soon after began to experience intermittent abdominal pain. Patient describes pain as "dull and achy" and rates pain as 3/10. Patient states that pain radiates to her back and right shoulder. Patient adds that pain worsened a few days ago after eating fried dishes again. Symptoms are moderate in severity. No mitigating or exacerbating factors reported. Associated sxs include diarrhea. Patient denies any fever, chills, SOB, CP, N/V, and all other sxs at this time. No further complaints or concerns at this time.  Patient denies alcohol ingestion.      Arrival mode: Personal vehicle     PCP: Christ Yepez MD        Past Medical History:  Past Medical History:   Diagnosis Date    CKD (chronic kidney disease)     DM II (diabetes mellitus, type II), controlled     Fatty liver     Gallstones     found on US    HLD (hyperlipidemia)     Hypertension     Hypothyroidism     s/p thyroidectomy       Past Surgical History:  Past Surgical History:   Procedure Laterality Date    APPENDECTOMY      1985    HYSTERECTOMY      2009 - Dr. [facility]    THYROIDECTOMY      1985         Family History:  Family History   Problem Relation Age of Onset    Diabetes Mother     Breast cancer Mother         late 70s    " Osteoarthritis Mother         double knee replacement    Heart disease Father        Social History:  Social History     Tobacco Use    Smoking status: Former Smoker    Smokeless tobacco: Never Used   Substance and Sexual Activity    Alcohol use: Not Currently    Drug use: Never    Sexual activity: Not on file        Review of Systems     Review of Systems   Constitutional: Negative for chills, fatigue and fever.   HENT: Negative for congestion, rhinorrhea, sneezing and sore throat.    Respiratory: Negative for cough and shortness of breath.    Cardiovascular: Negative for chest pain.   Gastrointestinal: Positive for abdominal pain (RUQ) and diarrhea. Negative for nausea and vomiting.   Genitourinary: Negative for dysuria, flank pain, frequency and hematuria.   Musculoskeletal: Positive for back pain (Generalized) and myalgias (R shoulder).   Skin: Negative for wound.   Neurological: Negative for dizziness, light-headedness and headaches.   Hematological: Does not bruise/bleed easily.      Physical Exam     Initial Vitals [06/08/22 0817]   BP Pulse Resp Temp SpO2   (!) 140/72 75 20 98.1 °F (36.7 °C) 97 %      MAP       --          Physical Exam  Nursing Notes and Vital Signs Reviewed.  Constitutional: Patient is in no acute distress. Well-developed and well-nourished.  Head: Atraumatic. Normocephalic.  Eyes: EOM intact. Conjunctivae are not pale. No scleral icterus.  ENT: Mucous membranes are moist. Oropharynx is clear and symmetric.    Neck: Supple. Full ROM.   Cardiovascular: Regular rate. Regular rhythm. No murmurs, rubs, or gallops. Distal pulses are 2+ and symmetric.  Pulmonary/Chest: No respiratory distress. Clear to auscultation bilaterally. No wheezing or rales.  Abdominal: Soft and non-distended.  There is RUQ tenderness.  No rebound, guarding, or rigidity. Good bowel sounds. Negative Wilburn's sign.  Musculoskeletal: Moves all extremities. No obvious deformities. No edema. No calf tenderness.  Skin:  "Warm and dry.  Neurological:  Alert, awake, and appropriate.  Normal speech.  No acute focal neurological deficits are appreciated.     ED Course   Procedures  ED Vital Signs:  Vitals:    06/08/22 0817 06/08/22 0836 06/08/22 1102 06/08/22 1132   BP: (!) 140/72  119/60 135/63   Pulse: 75 65 62 60   Resp: 20  15    Temp: 98.1 °F (36.7 °C)      TempSrc: Oral      SpO2: 97%      Weight: 80 kg (176 lb 7.7 oz)      Height: 5' 5" (1.651 m)          Abnormal Lab Results:  Labs Reviewed   COMPREHENSIVE METABOLIC PANEL - Abnormal; Notable for the following components:       Result Value    Sodium 135 (*)     CO2 20 (*)     Glucose 145 (*)     Alkaline Phosphatase 52 (*)     eGFR if  43 (*)     eGFR if non  37 (*)     All other components within normal limits   LIPASE - Abnormal; Notable for the following components:    Lipase 124 (*)     All other components within normal limits   CBC W/ AUTO DIFFERENTIAL   URINALYSIS, REFLEX TO URINE CULTURE    Narrative:     Specimen Source->Urine   TROPONIN I        All Lab Results:  Results for orders placed or performed during the hospital encounter of 06/08/22   CBC W/ AUTO DIFFERENTIAL   Result Value Ref Range    WBC 9.76 3.90 - 12.70 K/uL    RBC 4.54 4.00 - 5.40 M/uL    Hemoglobin 12.9 12.0 - 16.0 g/dL    Hematocrit 40.0 37.0 - 48.5 %    MCV 88 82 - 98 fL    MCH 28.4 27.0 - 31.0 pg    MCHC 32.3 32.0 - 36.0 g/dL    RDW 13.7 11.5 - 14.5 %    Platelets 300 150 - 450 K/uL    MPV 10.8 9.2 - 12.9 fL    Immature Granulocytes 0.3 0.0 - 0.5 %    Gran # (ANC) 6.6 1.8 - 7.7 K/uL    Immature Grans (Abs) 0.03 0.00 - 0.04 K/uL    Lymph # 2.2 1.0 - 4.8 K/uL    Mono # 0.7 0.3 - 1.0 K/uL    Eos # 0.1 0.0 - 0.5 K/uL    Baso # 0.08 0.00 - 0.20 K/uL    nRBC 0 0 /100 WBC    Gran % 68.0 38.0 - 73.0 %    Lymph % 22.2 18.0 - 48.0 %    Mono % 7.6 4.0 - 15.0 %    Eosinophil % 1.1 0.0 - 8.0 %    Basophil % 0.8 0.0 - 1.9 %    Differential Method Automated    Comp. Metabolic Panel "   Result Value Ref Range    Sodium 135 (L) 136 - 145 mmol/L    Potassium 3.7 3.5 - 5.1 mmol/L    Chloride 103 95 - 110 mmol/L    CO2 20 (L) 23 - 29 mmol/L    Glucose 145 (H) 70 - 110 mg/dL    BUN 18 8 - 23 mg/dL    Creatinine 1.4 0.5 - 1.4 mg/dL    Calcium 9.2 8.7 - 10.5 mg/dL    Total Protein 7.3 6.0 - 8.4 g/dL    Albumin 3.7 3.5 - 5.2 g/dL    Total Bilirubin 0.4 0.1 - 1.0 mg/dL    Alkaline Phosphatase 52 (L) 55 - 135 U/L    AST 21 10 - 40 U/L    ALT 23 10 - 44 U/L    Anion Gap 12 8 - 16 mmol/L    eGFR if African American 43 (A) >60 mL/min/1.73 m^2    eGFR if non African American 37 (A) >60 mL/min/1.73 m^2   Lipase   Result Value Ref Range    Lipase 124 (H) 4 - 60 U/L   Urinalysis, Reflex to Urine Culture Urine, Clean Catch    Specimen: Urine   Result Value Ref Range    Specimen UA Urine, Clean Catch     Color, UA Yellow Yellow, Straw, Janina    Appearance, UA Clear Clear    pH, UA 6.0 5.0 - 8.0    Specific Gravity, UA 1.010 1.005 - 1.030    Protein, UA Negative Negative    Glucose, UA Negative Negative    Ketones, UA Negative Negative    Bilirubin (UA) Negative Negative    Occult Blood UA Negative Negative    Nitrite, UA Negative Negative    Urobilinogen, UA Negative <2.0 EU/dL    Leukocytes, UA Negative Negative   Troponin I   Result Value Ref Range    Troponin I <0.006 0.000 - 0.026 ng/mL       Imaging Results:  Imaging Results          X-Ray Chest AP Portable (Final result)  Result time 06/08/22 10:31:12    Final result by Jaquan Reece MD (06/08/22 10:31:12)                 Impression:      No acute abnormality.      Electronically signed by: Jaquan Reece  Date:    06/08/2022  Time:    10:31             Narrative:    EXAMINATION:  XR CHEST AP PORTABLE    CLINICAL HISTORY:  . Right upper quadrant pain    TECHNIQUE:  Single frontal portable view of the chest was performed.    COMPARISON:  None    FINDINGS:  Support devices: None    The lungs are clear, with normal appearance of pulmonary vasculature and no  pleural effusion or pneumothorax.    The cardiac silhouette is normal in size. The hilar and mediastinal contours are unremarkable.    Bones are intact.                               US Abdomen Limited (Final result)  Result time 06/08/22 10:13:58    Final result by Taj Looney MD (06/08/22 10:13:58)                 Impression:      No acute abnormalities      Electronically signed by: Taj Looney MD  Date:    06/08/2022  Time:    10:13             Narrative:    EXAMINATION:  US ABDOMEN LIMITED    CLINICAL HISTORY:  Right upper quadrant pain    COMPARISON:  None    FINDINGS:  Limited right upper quadrant ultrasound performed.  The liver measures 15.7 cm in length and demonstrates increased echogenicity consistent with hepatic steatosis.  Common bile duct is within normal limits at 4.5 mm..  No gallstones, gallbladder wall thickening, or focal tenderness over the gallbladder.  The visualized portions of the pancreas and IVC are within normal limits.  The right kidney is normal in length measuring 10.7 cm with lobulated contour.  No right sided hydronephrosis or renal masses identified.                                 The EKG was ordered, reviewed, and independently interpreted by the ED provider.  Interpretation time: 09:09:36  Rate: 70 BPM  Rhythm: normal sinus rhythm  Interpretation: No acute abnormalities. No STEMI.             The Emergency Provider reviewed the vital signs and test results, which are outlined above.     ED Discussion     11:14 AM: Reassessed pt at this time.  Repeat abdominal exam soft.  No rebound or guarding no masses.  Discussed with pt all pertinent ED information and results. Discussed pt dx and plan of tx. Gave pt all f/u and return to the ED instructions. All questions and concerns were addressed at this time. Pt expresses understanding of information and instructions, and is comfortable with plan to discharge. Pt is stable for discharge.    I discussed with patient and/or  family/caretaker that evaluation in the ED does not suggest any emergent or life threatening medical conditions requiring immediate intervention beyond what was provided in the ED, and I believe patient is safe for discharge.  Regardless, an unremarkable evaluation in the ED does not preclude the development or presence of a serious of life threatening condition. As such, patient was instructed to return immediately for any worsening or change in current symptoms.         Medical Decision Making:   Clinical Tests:   Lab Tests: Ordered and Reviewed  Radiological Study: Ordered and Reviewed  Medical Tests: Ordered and Reviewed           ED Medication(s):  Medications - No data to display    Discharge Medication List as of 6/8/2022 11:29 AM      START taking these medications    Details   famotidine (PEPCID) 20 MG tablet Take 1 tablet (20 mg total) by mouth once daily at 6am., Starting Wed 6/8/2022, Until Thu 6/8/2023, Print              Follow-up Information     Christ Yepez MD In 2 days.    Specialty: Family Medicine  Why: return to emergency department for:  Chest pain, chest pressure, shortness of breath, worsening pain, fever, vomiting, blood in stool, other concerns.  Contact information:  [address] [place] [address] [id number] [phone]             [facility] In 2 days.    Specialty: Gastroenterology  Contact information:  [address] [place] [address] [phone]  Additional information:  Please take [address] to the Medical Office Building. Check in on the 4th floor.           Call [phone].    Contact information:  To Schedule outpatient HIDA scan.                           Scribe Attestation:   Scribe #1: I performed the above scribed service and the documentation accurately describes the services I performed. I attest to the accuracy of the note.     Attending:   Physician Attestation Statement for Scribe #1: IChristy, ,  personally performed the services described in this documentation, as scribed by Mi Jimenez, in my presence, and it is both accurate and complete.           Clinical Impression       ICD-10-CM ICD-9-CM   1. Abdominal pain  R10.9 789.00   2. Right upper quadrant pain  R10.11 789.01       Disposition:   Disposition: Discharged  Condition: Stable       Christy iFgueroa, DO  06/08/22 6515

## 2022-06-12 NOTE — TELEPHONE ENCOUNTER
No new care gaps identified.  [facility] Embedded Care Gaps. Reference number: [id number]. 6/12/2022   12:08:42 PM CDT

## 2022-06-13 RX ORDER — LISINOPRIL AND HYDROCHLOROTHIAZIDE 12.5; 2 MG/1; MG/1
TABLET ORAL
Qty: 180 TABLET | Refills: 2 | Status: SHIPPED | OUTPATIENT
Start: 2022-06-13 | End: 2022-09-08 | Stop reason: ALTCHOICE

## 2022-06-13 NOTE — TELEPHONE ENCOUNTER
No new care gaps identified.  [facility] Embedded Care Gaps. Reference number: [id number]. 6/13/2022   11:14:30 AM CDT

## 2022-06-13 NOTE — TELEPHONE ENCOUNTER
Refill Routing Note   Medication(s) are not appropriate for processing by [facility] for the following reason(s):      - Patient has been seen in the ED/Hospital since the last PCP visit    [facility] action(s):  Route          Medication reconciliation completed: No     Appointments  past 12m or future 3m with PCP    Date Provider   Last Visit   Visit date not found Glenda Joyce MD   Next Visit   6/13/2022 Glenda Joyce MD   ED visits in past 90 days: 1        Note composed:1:20 PM 06/13/2022

## 2022-06-14 ENCOUNTER — TELEPHONE (OUTPATIENT)
Dept: INTERNAL MEDICINE | Facility: CLINIC | Age: 77
End: 2022-06-14
Payer: MEDICARE

## 2022-06-14 DIAGNOSIS — Z12.39 ENCOUNTER FOR SCREENING FOR MALIGNANT NEOPLASM OF BREAST, UNSPECIFIED SCREENING MODALITY: Primary | ICD-10-CM

## 2022-06-14 DIAGNOSIS — Z12.31 ENCOUNTER FOR SCREENING MAMMOGRAM FOR MALIGNANT NEOPLASM OF BREAST: ICD-10-CM

## 2022-06-14 DIAGNOSIS — Z12.31 ENCOUNTER FOR SCREENING MAMMOGRAM FOR MALIGNANT NEOPLASM OF BREAST: Primary | ICD-10-CM

## 2022-06-14 RX ORDER — LISINOPRIL AND HYDROCHLOROTHIAZIDE 12.5; 2 MG/1; MG/1
2 TABLET ORAL DAILY
Qty: 180 TABLET | Refills: 2 | OUTPATIENT
Start: 2022-06-14

## 2022-06-14 NOTE — TELEPHONE ENCOUNTER
----- Message from Socorro Joyce sent at 6/14/2022  8:12 AM CDT -----  Contact: Patsy  Type:  Mammogram    Caller is requesting to schedule their annual mammogram appointment.  Order is not listed in EPIC.  Please enter order and contact patient to schedule.  Name of Caller: Patsy  Where would they like the mammogram performed? [facility]   Would the patient rather a call back or a response via [facility]? Call back   Best Call Back Number: Please call her at [phone]  Additional Information:          Patient discharged to home with brother. Patient alert and oriented x 4, ambulatory with steady gait. Patient verbalizes understanding of discharge instructions, follow up care, return precautions, talbot catheter care, and prescription x 1. Patient denies questions at time of discharge.     Talbot catheter converted to leg bag prior to discharge, stat-lock in place.

## 2022-06-20 ENCOUNTER — LAB VISIT (OUTPATIENT)
Dept: LAB | Facility: HOSPITAL | Age: 77
End: 2022-06-20
Attending: PHYSICIAN ASSISTANT
Payer: MEDICARE

## 2022-06-20 ENCOUNTER — OFFICE VISIT (OUTPATIENT)
Dept: GASTROENTEROLOGY | Facility: CLINIC | Age: 77
End: 2022-06-20
Payer: MEDICARE

## 2022-06-20 VITALS
HEART RATE: 78 BPM | OXYGEN SATURATION: 99 % | DIASTOLIC BLOOD PRESSURE: 60 MMHG | BODY MASS INDEX: 29.8 KG/M2 | WEIGHT: 178.88 LBS | HEIGHT: 65 IN | SYSTOLIC BLOOD PRESSURE: 140 MMHG

## 2022-06-20 DIAGNOSIS — E11.69 TYPE 2 DIABETES MELLITUS WITH OTHER SPECIFIED COMPLICATION, WITHOUT LONG-TERM CURRENT USE OF INSULIN: ICD-10-CM

## 2022-06-20 DIAGNOSIS — R74.8 ELEVATED LIPASE: Primary | ICD-10-CM

## 2022-06-20 DIAGNOSIS — R10.11 RIGHT UPPER QUADRANT PAIN: ICD-10-CM

## 2022-06-20 DIAGNOSIS — R74.8 ELEVATED LIPASE: ICD-10-CM

## 2022-06-20 DIAGNOSIS — K76.0 FATTY LIVER: ICD-10-CM

## 2022-06-20 LAB
ALBUMIN SERPL BCP-MCNC: 3.8 G/DL (ref 3.5–5.2)
ALP SERPL-CCNC: 54 U/L (ref 55–135)
ALT SERPL W/O P-5'-P-CCNC: 22 U/L (ref 10–44)
AST SERPL-CCNC: 18 U/L (ref 10–40)
BILIRUB DIRECT SERPL-MCNC: 0.1 MG/DL (ref 0.1–0.3)
BILIRUB SERPL-MCNC: 0.2 MG/DL (ref 0.1–1)
LIPASE SERPL-CCNC: 105 U/L (ref 4–60)
PROT SERPL-MCNC: 6.7 G/DL (ref 6–8.4)

## 2022-06-20 PROCEDURE — 3044F PR MOST RECENT HEMOGLOBIN A1C LEVEL <7.0%: ICD-10-PCS | Mod: CPTII,S$GLB,, | Performed by: PHYSICIAN ASSISTANT

## 2022-06-20 PROCEDURE — 3078F PR MOST RECENT DIASTOLIC BLOOD PRESSURE < 80 MM HG: ICD-10-PCS | Mod: CPTII,S$GLB,, | Performed by: PHYSICIAN ASSISTANT

## 2022-06-20 PROCEDURE — 3066F PR DOCUMENTATION OF TREATMENT FOR NEPHROPATHY: ICD-10-PCS | Mod: CPTII,S$GLB,, | Performed by: PHYSICIAN ASSISTANT

## 2022-06-20 PROCEDURE — 1101F PT FALLS ASSESS-DOCD LE1/YR: CPT | Mod: CPTII,S$GLB,, | Performed by: PHYSICIAN ASSISTANT

## 2022-06-20 PROCEDURE — 83690 ASSAY OF LIPASE: CPT | Performed by: PHYSICIAN ASSISTANT

## 2022-06-20 PROCEDURE — 3066F NEPHROPATHY DOC TX: CPT | Mod: CPTII,S$GLB,, | Performed by: PHYSICIAN ASSISTANT

## 2022-06-20 PROCEDURE — 3008F PR BODY MASS INDEX (BMI) DOCUMENTED: ICD-10-PCS | Mod: CPTII,S$GLB,, | Performed by: PHYSICIAN ASSISTANT

## 2022-06-20 PROCEDURE — 3061F PR NEG MICROALBUMINURIA RESULT DOCUMENTED/REVIEW: ICD-10-PCS | Mod: CPTII,S$GLB,, | Performed by: PHYSICIAN ASSISTANT

## 2022-06-20 PROCEDURE — 99999 PR PBB SHADOW E&M-EST. PATIENT-LVL IV: ICD-10-PCS | Mod: PBBFAC,,, | Performed by: PHYSICIAN ASSISTANT

## 2022-06-20 PROCEDURE — 3008F BODY MASS INDEX DOCD: CPT | Mod: CPTII,S$GLB,, | Performed by: PHYSICIAN ASSISTANT

## 2022-06-20 PROCEDURE — 4010F ACE/ARB THERAPY RXD/TAKEN: CPT | Mod: CPTII,S$GLB,, | Performed by: PHYSICIAN ASSISTANT

## 2022-06-20 PROCEDURE — 3288F PR FALLS RISK ASSESSMENT DOCUMENTED: ICD-10-PCS | Mod: CPTII,S$GLB,, | Performed by: PHYSICIAN ASSISTANT

## 2022-06-20 PROCEDURE — 3044F HG A1C LEVEL LT 7.0%: CPT | Mod: CPTII,S$GLB,, | Performed by: PHYSICIAN ASSISTANT

## 2022-06-20 PROCEDURE — 1159F MED LIST DOCD IN RCRD: CPT | Mod: CPTII,S$GLB,, | Performed by: PHYSICIAN ASSISTANT

## 2022-06-20 PROCEDURE — 1159F PR MEDICATION LIST DOCUMENTED IN MEDICAL RECORD: ICD-10-PCS | Mod: CPTII,S$GLB,, | Performed by: PHYSICIAN ASSISTANT

## 2022-06-20 PROCEDURE — 99499 RISK ADDL DX/OHS AUDIT: ICD-10-PCS | Mod: S$GLB,,, | Performed by: PHYSICIAN ASSISTANT

## 2022-06-20 PROCEDURE — 1126F AMNT PAIN NOTED NONE PRSNT: CPT | Mod: CPTII,S$GLB,, | Performed by: PHYSICIAN ASSISTANT

## 2022-06-20 PROCEDURE — 80076 HEPATIC FUNCTION PANEL: CPT | Performed by: PHYSICIAN ASSISTANT

## 2022-06-20 PROCEDURE — 99499 UNLISTED E&M SERVICE: CPT | Mod: S$GLB,,, | Performed by: PHYSICIAN ASSISTANT

## 2022-06-20 PROCEDURE — 3288F FALL RISK ASSESSMENT DOCD: CPT | Mod: CPTII,S$GLB,, | Performed by: PHYSICIAN ASSISTANT

## 2022-06-20 PROCEDURE — 3077F PR MOST RECENT SYSTOLIC BLOOD PRESSURE >= 140 MM HG: ICD-10-PCS | Mod: CPTII,S$GLB,, | Performed by: PHYSICIAN ASSISTANT

## 2022-06-20 PROCEDURE — 99203 PR OFFICE/OUTPT VISIT, NEW, LEVL III, 30-44 MIN: ICD-10-PCS | Mod: S$GLB,,, | Performed by: PHYSICIAN ASSISTANT

## 2022-06-20 PROCEDURE — 4010F PR ACE/ARB THEARPY RXD/TAKEN: ICD-10-PCS | Mod: CPTII,S$GLB,, | Performed by: PHYSICIAN ASSISTANT

## 2022-06-20 PROCEDURE — 36415 COLL VENOUS BLD VENIPUNCTURE: CPT | Performed by: PHYSICIAN ASSISTANT

## 2022-06-20 PROCEDURE — 1126F PR PAIN SEVERITY QUANTIFIED, NO PAIN PRESENT: ICD-10-PCS | Mod: CPTII,S$GLB,, | Performed by: PHYSICIAN ASSISTANT

## 2022-06-20 PROCEDURE — 99999 PR PBB SHADOW E&M-EST. PATIENT-LVL IV: CPT | Mod: PBBFAC,,, | Performed by: PHYSICIAN ASSISTANT

## 2022-06-20 PROCEDURE — 3061F NEG MICROALBUMINURIA REV: CPT | Mod: CPTII,S$GLB,, | Performed by: PHYSICIAN ASSISTANT

## 2022-06-20 PROCEDURE — 1101F PR PT FALLS ASSESS DOC 0-1 FALLS W/OUT INJ PAST YR: ICD-10-PCS | Mod: CPTII,S$GLB,, | Performed by: PHYSICIAN ASSISTANT

## 2022-06-20 PROCEDURE — 3078F DIAST BP <80 MM HG: CPT | Mod: CPTII,S$GLB,, | Performed by: PHYSICIAN ASSISTANT

## 2022-06-20 PROCEDURE — 99203 OFFICE O/P NEW LOW 30 MIN: CPT | Mod: S$GLB,,, | Performed by: PHYSICIAN ASSISTANT

## 2022-06-20 PROCEDURE — 3077F SYST BP >= 140 MM HG: CPT | Mod: CPTII,S$GLB,, | Performed by: PHYSICIAN ASSISTANT

## 2022-06-20 NOTE — PROGRESS NOTES
Clinic Consult:  [facility] Consultation Note    Reason for Consult:  The primary encounter diagnosis was Elevated lipase. Diagnoses of Right upper quadrant pain, Fatty liver, and Type 2 diabetes mellitus with other specified complication, without long-term current use of insulin were also pertinent to this visit.    PCP: Christ Yepez   [address] [place] [address]  / [place]LER [id number]    CC: Diarrhea (Yellow in color) and Abdominal Pain      HPI:  This is a 76 y.o. female here for evaluation of the above.   History of gallstones with obstruction. Did not have cholecystectomy.   Went to dinner - fried cheese, onion rings, fried chicken. Came home to bright yellow diarrhea. Then RUQ began to hurt. Wasn't severe, it was a dull ache. Did not wake her from sleep. Careful of what she ate thereafter. Came and went. Another episode of diarrhea, bright yellow, mucusy, with green looking seeds. Improved. Thought it could be her IBS.  Always tended more toward loose bowels. During all of the this, she had a lot of acid reflux.  Went to ED the other night and was given Pepcid which is really helping.   Lipase was mildly elevated at 124. Us completed which was normal other than fatty liver. HIDA scan is scheduled for about 2 weeks from now.   Last colonoscopy 2018 - recall 8 yrs. Brother with pancreatic cancer. No additional family history of pancreatic cancer history.   All lasted 4-5 days.  Pain worse with stomach is empty, better with food intake. With Pepcid, symptoms have improved about 90%. Stools back to normal color and consistency.hepatic function        Review of Systems   Constitutional: Negative for activity change, appetite change, chills, diaphoresis, fatigue, fever and unexpected weight change.   HENT: Negative for trouble swallowing.    Respiratory: Negative for cough and shortness of breath.    Cardiovascular: Negative for chest pain.   Gastrointestinal: Negative for abdominal distention,  abdominal pain, anal bleeding, blood in stool, constipation, diarrhea, nausea and vomiting.   Genitourinary: Negative for dysuria and hematuria.   Musculoskeletal: Negative for back pain.   Skin: Negative for color change and pallor.   Neurological: Negative for dizziness and weakness.   Psychiatric/Behavioral: Negative for dysphoric mood. The patient is not nervous/anxious.         Medical History:   Past Medical History:   Diagnosis Date    CKD (chronic kidney disease)     DM II (diabetes mellitus, type II), controlled     Fatty liver     Gallstones     found on US    HLD (hyperlipidemia)     Hypertension     Hypothyroidism     s/p thyroidectomy       Surgical History:   Past Surgical History:   Procedure Laterality Date    APPENDECTOMY      1985    HYSTERECTOMY      2009 - [facility]    THYROIDECTOMY      1985       Family History:    Family History   Problem Relation Age of Onset    Diabetes Mother     Breast cancer Mother         late 70s    Osteoarthritis Mother         double knee replacement    Heart disease Father        Social History:   Social History     Socioeconomic History    Marital status:    Tobacco Use    Smoking status: Former Smoker    Smokeless tobacco: Never Used   Substance and Sexual Activity    Alcohol use: Not Currently    Drug use: Never       Allergies:   Review of patient's allergies indicates:  No Known Allergies    Home Medications:   Current Outpatient Medications on File Prior to Visit   Medication Sig Dispense Refill    amLODIPine (NORVASC) 10 MG tablet TAKE 1 TABLET BY MOUTH DAILY 90 tablet 1    ascorbic acid, vitamin C, (VITAMIN C) 250 MG tablet vitamin c 500mg      aspirin (ECOTRIN) 81 MG EC tablet Aspir-81 81 mg tablet,delayed release  Take 1 tablet every day by oral route for 30 days. 90 tablet 1    atenoloL (TENORMIN) 50 MG tablet Take 1 tablet (50 mg total) by mouth once daily. 90 tablet 3    blood sugar diagnostic  "(ACCU-CHEK SMARTVIEW TEST STRIP) Strp Use to test blood glucose two (2) times daily as directed with insurance preferred meter and supplies 200 strip 3    famotidine (PEPCID) 20 MG tablet Take 1 tablet (20 mg total) by mouth once daily at 6am. 30 tablet 0    fluticasone propionate (FLONASE) 50 mcg/actuation nasal spray SHAKE LIQUID AND USE 1 SPRAY(50 MCG) IN EACH NOSTRIL EVERY DAY 16 g 3    glimepiride (AMARYL) 2 MG tablet TAKE 1/2 TABLET BY MOUTH DAILY WITH BREAKFAST 90 tablet 0    JANUVIA 50 mg Tab TAKE 1 TABLET(50 MG) BY MOUTH EVERY DAY 90 tablet 0    lancets (ACCU-CHEK FASTCLIX LANCET DRUM) Misc Use to test blood glucose two (2) times daily as directed with insurance preferred meter and supplies 200 each 3    levothyroxine (SYNTHROID) 75 MCG tablet TAKE 1 TABLET BY MOUTH EVERY DAY 90 tablet 1    lisinopriL-hydrochlorothiazide (PRINZIDE,ZESTORETIC) 20-12.5 mg per tablet TAKE 2 TABLETS BY MOUTH EVERY  tablet 2    metFORMIN (GLUCOPHAGE) 500 MG tablet TAKE 1 TABLET BY MOUTH TWICE DAILY WITH MEALS 180 tablet 0    pravastatin (PRAVACHOL) 80 MG tablet TAKE 1 TABLET BY MOUTH DAILY 90 tablet 3     No current facility-administered medications on file prior to visit.       Physical Exam:  BP (!) 140/60   Pulse 78   Ht 5' 5" (1.651 m)   Wt 81.1 kg (178 lb 14.5 oz)   SpO2 99%   BMI 29.77 kg/m²   Body mass index is 29.77 kg/m².  Physical Exam  Constitutional:       General: She is not in acute distress.     Appearance: Normal appearance. She is not ill-appearing, toxic-appearing or diaphoretic.   HENT:      Head: Normocephalic and atraumatic.   Eyes:      General: No scleral icterus.     Extraocular Movements: Extraocular movements intact.   Cardiovascular:      Rate and Rhythm: Normal rate and regular rhythm.   Pulmonary:      Effort: Pulmonary effort is normal. No respiratory distress.   Abdominal:      General: Bowel sounds are normal. There is no distension.      Palpations: Abdomen is soft. There is " no mass.      Tenderness: There is no abdominal tenderness. There is no guarding.   Musculoskeletal:         General: Normal range of motion.   Skin:     General: Skin is warm and dry.      Coloration: Skin is not jaundiced.   Neurological:      Mental Status: She is alert and oriented to person, place, and time.           Labs: Pertinent labs reviewed.  Endoscopy: --  CRC Screenin  Anticoagulation: none    Assessment:  1. Elevated lipase    2. Right upper quadrant pain    3. Fatty liver    4. Type 2 diabetes mellitus with other specified complication, without long-term current use of insulin         Recommendations:  -continue on Pepcid, as this has been very beneficial for her symptoms.   -Keep scheduled HIDA  -Low suspicion for stone, however, can repeat hepatic function panel and lipase. Lipase mildly elevated, but pancreas normal on US.   -Possible duodenal ulcer? RUQ discomfort, improved with food rather than worse with food.   -Follow up in 4 weeks. Will revisit all symptoms.  -Discussed fatty liver briefly today. Will discuss further at follow up visit. In need of Fibroscan.    Elevated lipase  -     Lipase; Future; Expected date: 2022  -     Hepatic Function Panel; Future; Expected date: 2022    Right upper quadrant pain  -     Ambulatory referral/consult to Gastroenterology  -     Lipase; Future; Expected date: 2022  -     Hepatic Function Panel; Future; Expected date: 2022    Fatty liver    Type 2 diabetes mellitus with other specified complication, without long-term current use of insulin        No follow-ups on file.    Thank you so much for allowing me to participate in the care of Patsygreta Bermeo PA-C

## 2022-06-27 ENCOUNTER — HOSPITAL ENCOUNTER (OUTPATIENT)
Dept: RADIOLOGY | Facility: HOSPITAL | Age: 77
Discharge: HOME | End: 2022-06-27
Attending: EMERGENCY MEDICINE
Payer: MEDICARE

## 2022-06-27 DIAGNOSIS — R10.11 RIGHT UPPER QUADRANT PAIN: ICD-10-CM

## 2022-06-27 PROCEDURE — 78227 HEPATOBIL SYST IMAGE W/DRUG: CPT | Mod: 26,,, | Performed by: RADIOLOGY

## 2022-06-27 PROCEDURE — A9537 TC99M MEBROFENIN: HCPCS

## 2022-06-27 PROCEDURE — 78227 NM HEPATOBILIARY(HIDA) WITH PHARM AND EF: ICD-10-PCS | Mod: 26,,, | Performed by: RADIOLOGY

## 2022-07-12 ENCOUNTER — HOSPITAL ENCOUNTER (OUTPATIENT)
Dept: RADIOLOGY | Facility: HOSPITAL | Age: 77
Discharge: HOME | End: 2022-07-12
Attending: FAMILY MEDICINE
Payer: MEDICARE

## 2022-07-12 DIAGNOSIS — Z12.39 ENCOUNTER FOR SCREENING FOR MALIGNANT NEOPLASM OF BREAST, UNSPECIFIED SCREENING MODALITY: ICD-10-CM

## 2022-07-12 DIAGNOSIS — Z12.31 ENCOUNTER FOR SCREENING MAMMOGRAM FOR MALIGNANT NEOPLASM OF BREAST: ICD-10-CM

## 2022-07-12 PROCEDURE — 77063 BREAST TOMOSYNTHESIS BI: CPT | Mod: 26,,, | Performed by: RADIOLOGY

## 2022-07-12 PROCEDURE — 77067 SCR MAMMO BI INCL CAD: CPT | Mod: 26,,, | Performed by: RADIOLOGY

## 2022-07-12 PROCEDURE — 77063 BREAST TOMOSYNTHESIS BI: CPT | Mod: TC

## 2022-07-12 PROCEDURE — 77063 MAMMO DIGITAL SCREENING BILAT WITH TOMO: ICD-10-PCS | Mod: 26,,, | Performed by: RADIOLOGY

## 2022-07-12 PROCEDURE — 77067 SCR MAMMO BI INCL CAD: CPT | Mod: TC

## 2022-07-12 PROCEDURE — 77067 MAMMO DIGITAL SCREENING BILAT WITH TOMO: ICD-10-PCS | Mod: 26,,, | Performed by: RADIOLOGY

## 2022-08-10 ENCOUNTER — OFFICE VISIT (OUTPATIENT)
Dept: GASTROENTEROLOGY | Facility: CLINIC | Age: 77
End: 2022-08-10
Payer: MEDICARE

## 2022-08-10 VITALS
WEIGHT: 179.25 LBS | HEART RATE: 66 BPM | DIASTOLIC BLOOD PRESSURE: 60 MMHG | SYSTOLIC BLOOD PRESSURE: 124 MMHG | BODY MASS INDEX: 29.87 KG/M2 | HEIGHT: 65 IN | OXYGEN SATURATION: 99 %

## 2022-08-10 DIAGNOSIS — R94.8 ABNORMAL BILIARY HIDA SCAN: Primary | ICD-10-CM

## 2022-08-10 DIAGNOSIS — K21.9 GASTROESOPHAGEAL REFLUX DISEASE, UNSPECIFIED WHETHER ESOPHAGITIS PRESENT: ICD-10-CM

## 2022-08-10 DIAGNOSIS — R10.11 RUQ PAIN: ICD-10-CM

## 2022-08-10 PROCEDURE — 3288F PR FALLS RISK ASSESSMENT DOCUMENTED: ICD-10-PCS | Mod: CPTII,S$GLB,, | Performed by: PHYSICIAN ASSISTANT

## 2022-08-10 PROCEDURE — 1159F PR MEDICATION LIST DOCUMENTED IN MEDICAL RECORD: ICD-10-PCS | Mod: CPTII,S$GLB,, | Performed by: PHYSICIAN ASSISTANT

## 2022-08-10 PROCEDURE — 1126F PR PAIN SEVERITY QUANTIFIED, NO PAIN PRESENT: ICD-10-PCS | Mod: CPTII,S$GLB,, | Performed by: PHYSICIAN ASSISTANT

## 2022-08-10 PROCEDURE — 99999 PR PBB SHADOW E&M-EST. PATIENT-LVL IV: CPT | Mod: PBBFAC,,, | Performed by: PHYSICIAN ASSISTANT

## 2022-08-10 PROCEDURE — 1101F PR PT FALLS ASSESS DOC 0-1 FALLS W/OUT INJ PAST YR: ICD-10-PCS | Mod: CPTII,S$GLB,, | Performed by: PHYSICIAN ASSISTANT

## 2022-08-10 PROCEDURE — 99214 OFFICE O/P EST MOD 30 MIN: CPT | Mod: S$GLB,,, | Performed by: PHYSICIAN ASSISTANT

## 2022-08-10 PROCEDURE — 3074F SYST BP LT 130 MM HG: CPT | Mod: CPTII,S$GLB,, | Performed by: PHYSICIAN ASSISTANT

## 2022-08-10 PROCEDURE — 3008F BODY MASS INDEX DOCD: CPT | Mod: CPTII,S$GLB,, | Performed by: PHYSICIAN ASSISTANT

## 2022-08-10 PROCEDURE — 1101F PT FALLS ASSESS-DOCD LE1/YR: CPT | Mod: CPTII,S$GLB,, | Performed by: PHYSICIAN ASSISTANT

## 2022-08-10 PROCEDURE — 4010F PR ACE/ARB THEARPY RXD/TAKEN: ICD-10-PCS | Mod: CPTII,S$GLB,, | Performed by: PHYSICIAN ASSISTANT

## 2022-08-10 PROCEDURE — 3078F DIAST BP <80 MM HG: CPT | Mod: CPTII,S$GLB,, | Performed by: PHYSICIAN ASSISTANT

## 2022-08-10 PROCEDURE — 99999 PR PBB SHADOW E&M-EST. PATIENT-LVL IV: ICD-10-PCS | Mod: PBBFAC,,, | Performed by: PHYSICIAN ASSISTANT

## 2022-08-10 PROCEDURE — 3066F NEPHROPATHY DOC TX: CPT | Mod: CPTII,S$GLB,, | Performed by: PHYSICIAN ASSISTANT

## 2022-08-10 PROCEDURE — 4010F ACE/ARB THERAPY RXD/TAKEN: CPT | Mod: CPTII,S$GLB,, | Performed by: PHYSICIAN ASSISTANT

## 2022-08-10 PROCEDURE — 3044F PR MOST RECENT HEMOGLOBIN A1C LEVEL <7.0%: ICD-10-PCS | Mod: CPTII,S$GLB,, | Performed by: PHYSICIAN ASSISTANT

## 2022-08-10 PROCEDURE — 99214 PR OFFICE/OUTPT VISIT, EST, LEVL IV, 30-39 MIN: ICD-10-PCS | Mod: S$GLB,,, | Performed by: PHYSICIAN ASSISTANT

## 2022-08-10 PROCEDURE — 1126F AMNT PAIN NOTED NONE PRSNT: CPT | Mod: CPTII,S$GLB,, | Performed by: PHYSICIAN ASSISTANT

## 2022-08-10 PROCEDURE — 3074F PR MOST RECENT SYSTOLIC BLOOD PRESSURE < 130 MM HG: ICD-10-PCS | Mod: CPTII,S$GLB,, | Performed by: PHYSICIAN ASSISTANT

## 2022-08-10 PROCEDURE — 3044F HG A1C LEVEL LT 7.0%: CPT | Mod: CPTII,S$GLB,, | Performed by: PHYSICIAN ASSISTANT

## 2022-08-10 PROCEDURE — 3078F PR MOST RECENT DIASTOLIC BLOOD PRESSURE < 80 MM HG: ICD-10-PCS | Mod: CPTII,S$GLB,, | Performed by: PHYSICIAN ASSISTANT

## 2022-08-10 PROCEDURE — 3066F PR DOCUMENTATION OF TREATMENT FOR NEPHROPATHY: ICD-10-PCS | Mod: CPTII,S$GLB,, | Performed by: PHYSICIAN ASSISTANT

## 2022-08-10 PROCEDURE — 1159F MED LIST DOCD IN RCRD: CPT | Mod: CPTII,S$GLB,, | Performed by: PHYSICIAN ASSISTANT

## 2022-08-10 PROCEDURE — 3061F NEG MICROALBUMINURIA REV: CPT | Mod: CPTII,S$GLB,, | Performed by: PHYSICIAN ASSISTANT

## 2022-08-10 PROCEDURE — 3008F PR BODY MASS INDEX (BMI) DOCUMENTED: ICD-10-PCS | Mod: CPTII,S$GLB,, | Performed by: PHYSICIAN ASSISTANT

## 2022-08-10 PROCEDURE — 3288F FALL RISK ASSESSMENT DOCD: CPT | Mod: CPTII,S$GLB,, | Performed by: PHYSICIAN ASSISTANT

## 2022-08-10 PROCEDURE — 3061F PR NEG MICROALBUMINURIA RESULT DOCUMENTED/REVIEW: ICD-10-PCS | Mod: CPTII,S$GLB,, | Performed by: PHYSICIAN ASSISTANT

## 2022-08-10 RX ORDER — FAMOTIDINE 20 MG/1
20 TABLET, FILM COATED ORAL DAILY
Qty: 30 TABLET | Refills: 11 | Status: SHIPPED | OUTPATIENT
Start: 2022-08-10 | End: 2022-10-16 | Stop reason: SDUPTHER

## 2022-08-10 NOTE — PROGRESS NOTES
Subjective:      Patient ID: Ptasy Perez is a 76 y.o. female.    Chief Complaint: Results    HPI:  Patient reports today to discuss results of her HIDA scan and continued abdominal discomfort. Patient was seen in the ED for upper abdominal discomfort which is when the HIDA was ordered. Results show abnormal EF of 30% consistent with biliary dyskinesia or possible chronic cholecystitis. She has not seen general surgery. She has history of biliary obstruction due to stone in the past but never had cholecystectomy. Pain comes and goes. Located to RUQ, worse after certain meals. Denies nausea/vomiting. Repeat lipase level decreased to 105, LFTs normal.  Still taking Pepcid. All heartburn symptoms have resolved. She is requesting refill.    Review of Systems   Constitutional: Negative for activity change, appetite change, chills, diaphoresis, fatigue, fever and unexpected weight change.   HENT: Negative for trouble swallowing.    Respiratory: Negative for cough and shortness of breath.    Cardiovascular: Negative for chest pain.   Gastrointestinal: Positive for abdominal pain. Negative for abdominal distention, blood in stool, constipation, diarrhea, nausea and vomiting.   Skin: Negative for color change and pallor.   Neurological: Negative for dizziness and weakness.   Psychiatric/Behavioral: Negative for dysphoric mood. The patient is not nervous/anxious.        Medical History: Reviewed    Social History: Reviewed    Allergies: Reviewed    Objective:     Physical Exam  Constitutional:       General: She is not in acute distress.     Appearance: Normal appearance. She is not ill-appearing, toxic-appearing or diaphoretic.   HENT:      Head: Normocephalic and atraumatic.   Eyes:      General: No scleral icterus.     Extraocular Movements: Extraocular movements intact.   Cardiovascular:      Rate and Rhythm: Normal rate and regular rhythm.   Pulmonary:      Effort: Pulmonary effort is normal. No respiratory distress.       Breath sounds: Normal breath sounds.   Abdominal:      General: Bowel sounds are normal. There is no distension.      Palpations: Abdomen is soft.      Tenderness: There is no abdominal tenderness. There is no guarding.   Musculoskeletal:         General: Normal range of motion.      Cervical back: Normal range of motion.   Skin:     General: Skin is warm and dry.      Coloration: Skin is not jaundiced.   Neurological:      Mental Status: She is alert and oriented to person, place, and time.         Assessment:     1. Abnormal biliary HIDA scan    2. RUQ pain    3. Gastroesophageal reflux disease, unspecified whether esophagitis present        Plan:     -Continue Pepcid for reflux symptoms. Will refill today.  -Refer to General surgery for abnormal HIDA scan, intermittent RUQ pain, history of biliary obstruction years ago.  -Lipase trending down. Pancreas normal on US imaging.   -GERD diet.    Patsy was seen today for results.    Diagnoses and all orders for this visit:    Abnormal biliary HIDA scan  -     Ambulatory referral/consult to General Surgery; Future    RUQ pain  -     Ambulatory referral/consult to General Surgery; Future    Gastroesophageal reflux disease, unspecified whether esophagitis present  -     famotidine (PEPCID) 20 MG tablet; Take 1 tablet (20 mg total) by mouth once daily at 6am.        No follow-ups on file.    Thank you for the opportunity to participate in the care of this patient.   Ai Bermeo PA-C.

## 2022-08-24 DIAGNOSIS — Z78.0 MENOPAUSE: ICD-10-CM

## 2022-08-30 ENCOUNTER — LAB VISIT (OUTPATIENT)
Dept: LAB | Facility: HOSPITAL | Age: 77
End: 2022-08-30
Attending: SURGERY
Payer: MEDICARE

## 2022-08-30 ENCOUNTER — OFFICE VISIT (OUTPATIENT)
Dept: SURGERY | Facility: CLINIC | Age: 77
End: 2022-08-30
Payer: MEDICARE

## 2022-08-30 VITALS
BODY MASS INDEX: 29.72 KG/M2 | TEMPERATURE: 98 F | DIASTOLIC BLOOD PRESSURE: 67 MMHG | SYSTOLIC BLOOD PRESSURE: 139 MMHG | WEIGHT: 178.56 LBS | HEART RATE: 68 BPM

## 2022-08-30 DIAGNOSIS — K82.8 BILIARY DYSKINESIA: ICD-10-CM

## 2022-08-30 DIAGNOSIS — R94.8 ABNORMAL BILIARY HIDA SCAN: ICD-10-CM

## 2022-08-30 DIAGNOSIS — R10.11 RUQ PAIN: ICD-10-CM

## 2022-08-30 DIAGNOSIS — K82.8 BILIARY DYSKINESIA: Primary | ICD-10-CM

## 2022-08-30 LAB
ALBUMIN SERPL BCP-MCNC: 3.9 G/DL (ref 3.5–5.2)
ALP SERPL-CCNC: 63 U/L (ref 55–135)
ALT SERPL W/O P-5'-P-CCNC: 22 U/L (ref 10–44)
ANION GAP SERPL CALC-SCNC: 10 MMOL/L (ref 8–16)
AST SERPL-CCNC: 19 U/L (ref 10–40)
BASOPHILS # BLD AUTO: 0.1 K/UL (ref 0–0.2)
BASOPHILS NFR BLD: 1 % (ref 0–1.9)
BILIRUB SERPL-MCNC: 0.3 MG/DL (ref 0.1–1)
BUN SERPL-MCNC: 18 MG/DL (ref 8–23)
CALCIUM SERPL-MCNC: 10 MG/DL (ref 8.7–10.5)
CHLORIDE SERPL-SCNC: 99 MMOL/L (ref 95–110)
CO2 SERPL-SCNC: 24 MMOL/L (ref 23–29)
CREAT SERPL-MCNC: 1 MG/DL (ref 0.5–1.4)
DIFFERENTIAL METHOD: NORMAL
EOSINOPHIL # BLD AUTO: 0.1 K/UL (ref 0–0.5)
EOSINOPHIL NFR BLD: 1.3 % (ref 0–8)
ERYTHROCYTE [DISTWIDTH] IN BLOOD BY AUTOMATED COUNT: 13.2 % (ref 11.5–14.5)
EST. GFR  (NO RACE VARIABLE): 59.1 ML/MIN/1.73 M^2
GLUCOSE SERPL-MCNC: 121 MG/DL (ref 70–110)
HCT VFR BLD AUTO: 37.6 % (ref 37–48.5)
HGB BLD-MCNC: 13 G/DL (ref 12–16)
IMM GRANULOCYTES # BLD AUTO: 0.04 K/UL (ref 0–0.04)
IMM GRANULOCYTES NFR BLD AUTO: 0.4 % (ref 0–0.5)
LYMPHOCYTES # BLD AUTO: 2.6 K/UL (ref 1–4.8)
LYMPHOCYTES NFR BLD: 25.1 % (ref 18–48)
MCH RBC QN AUTO: 29.3 PG (ref 27–31)
MCHC RBC AUTO-ENTMCNC: 34.6 G/DL (ref 32–36)
MCV RBC AUTO: 85 FL (ref 82–98)
MONOCYTES # BLD AUTO: 0.8 K/UL (ref 0.3–1)
MONOCYTES NFR BLD: 7.3 % (ref 4–15)
NEUTROPHILS # BLD AUTO: 6.7 K/UL (ref 1.8–7.7)
NEUTROPHILS NFR BLD: 64.9 % (ref 38–73)
NRBC BLD-RTO: 0 /100 WBC
PLATELET # BLD AUTO: 357 K/UL (ref 150–450)
PMV BLD AUTO: 10.9 FL (ref 9.2–12.9)
POTASSIUM SERPL-SCNC: 4.1 MMOL/L (ref 3.5–5.1)
PROT SERPL-MCNC: 7.4 G/DL (ref 6–8.4)
RBC # BLD AUTO: 4.44 M/UL (ref 4–5.4)
SODIUM SERPL-SCNC: 133 MMOL/L (ref 136–145)
WBC # BLD AUTO: 10.25 K/UL (ref 3.9–12.7)

## 2022-08-30 PROCEDURE — 80053 COMPREHEN METABOLIC PANEL: CPT | Performed by: SURGERY

## 2022-08-30 PROCEDURE — 4010F ACE/ARB THERAPY RXD/TAKEN: CPT | Mod: CPTII,S$GLB,, | Performed by: SURGERY

## 2022-08-30 PROCEDURE — 3044F HG A1C LEVEL LT 7.0%: CPT | Mod: CPTII,S$GLB,, | Performed by: SURGERY

## 2022-08-30 PROCEDURE — 1159F MED LIST DOCD IN RCRD: CPT | Mod: CPTII,S$GLB,, | Performed by: SURGERY

## 2022-08-30 PROCEDURE — 99204 OFFICE O/P NEW MOD 45 MIN: CPT | Mod: S$GLB,,, | Performed by: SURGERY

## 2022-08-30 PROCEDURE — 99999 PR PBB SHADOW E&M-EST. PATIENT-LVL V: CPT | Mod: PBBFAC,,, | Performed by: SURGERY

## 2022-08-30 PROCEDURE — 3044F PR MOST RECENT HEMOGLOBIN A1C LEVEL <7.0%: ICD-10-PCS | Mod: CPTII,S$GLB,, | Performed by: SURGERY

## 2022-08-30 PROCEDURE — 85025 COMPLETE CBC W/AUTO DIFF WBC: CPT | Performed by: SURGERY

## 2022-08-30 PROCEDURE — 3075F SYST BP GE 130 - 139MM HG: CPT | Mod: CPTII,S$GLB,, | Performed by: SURGERY

## 2022-08-30 PROCEDURE — 3075F PR MOST RECENT SYSTOLIC BLOOD PRESS GE 130-139MM HG: ICD-10-PCS | Mod: CPTII,S$GLB,, | Performed by: SURGERY

## 2022-08-30 PROCEDURE — 3008F BODY MASS INDEX DOCD: CPT | Mod: CPTII,S$GLB,, | Performed by: SURGERY

## 2022-08-30 PROCEDURE — 3061F PR NEG MICROALBUMINURIA RESULT DOCUMENTED/REVIEW: ICD-10-PCS | Mod: CPTII,S$GLB,, | Performed by: SURGERY

## 2022-08-30 PROCEDURE — 99999 PR PBB SHADOW E&M-EST. PATIENT-LVL V: ICD-10-PCS | Mod: PBBFAC,,, | Performed by: SURGERY

## 2022-08-30 PROCEDURE — 3061F NEG MICROALBUMINURIA REV: CPT | Mod: CPTII,S$GLB,, | Performed by: SURGERY

## 2022-08-30 PROCEDURE — 4010F PR ACE/ARB THEARPY RXD/TAKEN: ICD-10-PCS | Mod: CPTII,S$GLB,, | Performed by: SURGERY

## 2022-08-30 PROCEDURE — 1159F PR MEDICATION LIST DOCUMENTED IN MEDICAL RECORD: ICD-10-PCS | Mod: CPTII,S$GLB,, | Performed by: SURGERY

## 2022-08-30 PROCEDURE — 3066F NEPHROPATHY DOC TX: CPT | Mod: CPTII,S$GLB,, | Performed by: SURGERY

## 2022-08-30 PROCEDURE — 3008F PR BODY MASS INDEX (BMI) DOCUMENTED: ICD-10-PCS | Mod: CPTII,S$GLB,, | Performed by: SURGERY

## 2022-08-30 PROCEDURE — 36415 COLL VENOUS BLD VENIPUNCTURE: CPT | Performed by: SURGERY

## 2022-08-30 PROCEDURE — 3078F PR MOST RECENT DIASTOLIC BLOOD PRESSURE < 80 MM HG: ICD-10-PCS | Mod: CPTII,S$GLB,, | Performed by: SURGERY

## 2022-08-30 PROCEDURE — 1126F PR PAIN SEVERITY QUANTIFIED, NO PAIN PRESENT: ICD-10-PCS | Mod: CPTII,S$GLB,, | Performed by: SURGERY

## 2022-08-30 PROCEDURE — 3066F PR DOCUMENTATION OF TREATMENT FOR NEPHROPATHY: ICD-10-PCS | Mod: CPTII,S$GLB,, | Performed by: SURGERY

## 2022-08-30 PROCEDURE — 99204 PR OFFICE/OUTPT VISIT, NEW, LEVL IV, 45-59 MIN: ICD-10-PCS | Mod: S$GLB,,, | Performed by: SURGERY

## 2022-08-30 PROCEDURE — 1126F AMNT PAIN NOTED NONE PRSNT: CPT | Mod: CPTII,S$GLB,, | Performed by: SURGERY

## 2022-08-30 PROCEDURE — 3078F DIAST BP <80 MM HG: CPT | Mod: CPTII,S$GLB,, | Performed by: SURGERY

## 2022-08-30 NOTE — PROGRESS NOTES
[facility] -   General Surgery History & Physical    SUBJECTIVE:     History of Present Illness:  Patient is a 76 y.o. female presents with episodes of right upper quadrant dull ache with yellow diarrhea.  The episodes occur after eating fried foods.  She has a history of choledocholithiasis.  HIDA scan was done which demonstrated a reduced gallbladder ejection fraction.      Review of patient's allergies indicates:  No Known Allergies    Current Outpatient Medications   Medication Sig Dispense Refill    amLODIPine (NORVASC) 10 MG tablet TAKE 1 TABLET BY MOUTH DAILY 90 tablet 1    ascorbic acid, vitamin C, (VITAMIN C) 250 MG tablet vitamin c 500mg      aspirin (ECOTRIN) 81 MG EC tablet Aspir-81 81 mg tablet,delayed release  Take 1 tablet every day by oral route for 30 days. 90 tablet 1    atenoloL (TENORMIN) 50 MG tablet Take 1 tablet (50 mg total) by mouth once daily. 90 tablet 3    blood sugar diagnostic (ACCU-CHEK SMARTVIEW TEST STRIP) Strp Use to test blood glucose two (2) times daily as directed with insurance preferred meter and supplies 200 strip 3    famotidine (PEPCID) 20 MG tablet Take 1 tablet (20 mg total) by mouth once daily at 6am. 30 tablet 11    fluticasone propionate (FLONASE) 50 mcg/actuation nasal spray SHAKE LIQUID AND USE 1 SPRAY(50 MCG) IN EACH NOSTRIL EVERY DAY 16 g 3    glimepiride (AMARYL) 2 MG tablet TAKE 1/2 TABLET BY MOUTH DAILY WITH BREAKFAST 90 tablet 0    lancets (ACCU-CHEK FASTCLIX LANCET DRUM) Misc Use to test blood glucose two (2) times daily as directed with insurance preferred meter and supplies 200 each 3    levothyroxine (SYNTHROID) 75 MCG tablet TAKE 1 TABLET BY MOUTH EVERY DAY 90 tablet 1    lisinopriL-hydrochlorothiazide (PRINZIDE,ZESTORETIC) 20-12.5 mg per tablet TAKE 2 TABLETS BY MOUTH EVERY  tablet 2    metFORMIN (GLUCOPHAGE) 500 MG tablet TAKE 1 TABLET BY MOUTH TWICE DAILY WITH MEALS 180 tablet 0    pravastatin (PRAVACHOL) 80 MG tablet TAKE 1 TABLET  BY MOUTH DAILY 90 tablet 3    SITagliptin (JANUVIA) 50 MG Tab Take 1 tablet (50 mg total) by mouth once daily. 90 tablet 0     No current facility-administered medications for this visit.       Past Medical History:   Diagnosis Date    CKD (chronic kidney disease)     DM II (diabetes mellitus, type II), controlled     Fatty liver     Gallstones     found on US    HLD (hyperlipidemia)     Hypertension     Hypothyroidism     s/p thyroidectomy     Past Surgical History:   Procedure Laterality Date    APPENDECTOMY      1985    HYSTERECTOMY      2009 - Dr. [facility]    THYROIDECTOMY      1985     Family History   Problem Relation Age of Onset    Diabetes Mother     Breast cancer Mother         late 70s    Osteoarthritis Mother         double knee replacement    Heart disease Father      Social History     Tobacco Use    Smoking status: Former    Smokeless tobacco: Never   Substance Use Topics    Alcohol use: Not Currently    Drug use: Never        Review of Systems:  Review of Systems   Constitutional:  Negative for fever.   Gastrointestinal:  Positive for abdominal pain and diarrhea. Negative for vomiting.     OBJECTIVE:     Vital Signs (Most Recent)  Blood pressure 139/67, pulse 68, temperature 98 °F (36.7 °C), temperature source Tympanic, weight 81 kg (178 lb 9.2 oz).          Physical Exam:  Physical Exam  Vitals and nursing note reviewed.   Constitutional:       General: She is not in acute distress.     Appearance: She is not ill-appearing, toxic-appearing or diaphoretic.   HENT:      Head: Normocephalic and atraumatic.      Nose: Nose normal.   Eyes:      General: No scleral icterus.        Right eye: No discharge.         Left eye: No discharge.      Extraocular Movements: Extraocular movements intact.   Cardiovascular:      Rate and Rhythm: Normal rate and regular rhythm.   Pulmonary:      Effort: Pulmonary effort is normal. No respiratory distress.      Breath sounds: No stridor.   Abdominal:       General: There is no distension.      Palpations: Abdomen is soft.      Tenderness: There is no abdominal tenderness. There is no guarding or rebound.   Musculoskeletal:      Cervical back: No rigidity.   Skin:     General: Skin is warm and dry.      Coloration: Skin is not jaundiced.   Neurological:      General: No focal deficit present.      Mental Status: She is alert and oriented to person, place, and time.   Psychiatric:         Mood and Affect: Mood normal.         Behavior: Behavior normal.       Laboratory  6/20/22 Hepatic functional panel WNL    Diagnostic Results:  EXAMINATION:  NM HEPATOBILIARY(HIDA) WITH PHARM AND EF     CLINICAL HISTORY:  RUQ abdominal pain, US nondiagnostic;  Right upper quadrant pain     TECHNIQUE:  Sequential camera imaging of the hepatobiliary system was performed following administration of 5.1 mCi of technetium 99m Choletec.  The gallbladder was stimulated with 8 oz of Novasource solution followed by 60 minutes of dynamic imaging and ejection fraction calculation.     COMPARISON:  None.     FINDINGS:  There is normal blood pool extraction of tracer by the liver.  The liver is normal in size, morphology, and tracer distribution.  There is prompt excretion of tracer into the biliary tract with subsequent normal transit into the small bowel.  The gallbladder is first visualized at approximately 22 minutes and fills normally thereafter.  The gallbladder ejection fraction is 30% at 60 minutes which is mildly decreased.     Impression:     Mildly decreased gallbladder ejection fraction consistent with gallbladder dyskinesis and possible chronic cholecystitis    ASSESSMENT/PLAN:     Patsy was seen today for consult.    Diagnoses and all orders for this visit:    Biliary dyskinesia  -     Case Request Operating Room: CHOLECYSTECTOMY, LAPAROSCOPIC  -     CBC Auto Differential; Future  -     COMPREHENSIVE METABOLIC PANEL; Future    Abnormal biliary HIDA scan  -     Ambulatory  referral/consult to General Surgery    RUQ pain  -     Ambulatory referral/consult to General Surgery       Will plan for laparoscopic cholecystectomy at the patient's earliest convenience.    After explaining the risks, benefits, and alternatives, patient verbalized understanding and would like to proceed with surgery. All questions were answered to their satisfaction.    Patient expressed understanding and is in agreement.      Roslyn Mckeon, DO  General Surgery  [facility]  8/30/2022

## 2022-08-31 DIAGNOSIS — E11.9 TYPE 2 DIABETES MELLITUS WITHOUT COMPLICATION: ICD-10-CM

## 2022-09-08 ENCOUNTER — OFFICE VISIT (OUTPATIENT)
Dept: INTERNAL MEDICINE | Facility: CLINIC | Age: 77
End: 2022-09-08
Payer: MEDICARE

## 2022-09-08 ENCOUNTER — LAB VISIT (OUTPATIENT)
Dept: LAB | Facility: HOSPITAL | Age: 77
End: 2022-09-08
Attending: FAMILY MEDICINE
Payer: MEDICARE

## 2022-09-08 VITALS
TEMPERATURE: 97 F | BODY MASS INDEX: 29.42 KG/M2 | SYSTOLIC BLOOD PRESSURE: 132 MMHG | WEIGHT: 176.56 LBS | OXYGEN SATURATION: 96 % | DIASTOLIC BLOOD PRESSURE: 60 MMHG | HEIGHT: 65 IN | HEART RATE: 71 BPM

## 2022-09-08 DIAGNOSIS — Z00.00 ANNUAL PHYSICAL EXAM: Primary | ICD-10-CM

## 2022-09-08 DIAGNOSIS — Z00.00 ANNUAL PHYSICAL EXAM: ICD-10-CM

## 2022-09-08 DIAGNOSIS — Z79.899 ENCOUNTER FOR LONG-TERM (CURRENT) USE OF MEDICATIONS: ICD-10-CM

## 2022-09-08 DIAGNOSIS — E03.9 HYPOTHYROIDISM, UNSPECIFIED TYPE: ICD-10-CM

## 2022-09-08 LAB
ESTIMATED AVG GLUCOSE: 140 MG/DL (ref 68–131)
HBA1C MFR BLD: 6.5 % (ref 4–5.6)
TSH SERPL DL<=0.005 MIU/L-ACNC: 0.88 UIU/ML (ref 0.4–4)
VIT B12 SERPL-MCNC: 295 PG/ML (ref 210–950)

## 2022-09-08 PROCEDURE — 3288F FALL RISK ASSESSMENT DOCD: CPT | Mod: CPTII,S$GLB,, | Performed by: FAMILY MEDICINE

## 2022-09-08 PROCEDURE — 3008F BODY MASS INDEX DOCD: CPT | Mod: CPTII,S$GLB,, | Performed by: FAMILY MEDICINE

## 2022-09-08 PROCEDURE — 82607 VITAMIN B-12: CPT | Performed by: FAMILY MEDICINE

## 2022-09-08 PROCEDURE — 1101F PT FALLS ASSESS-DOCD LE1/YR: CPT | Mod: CPTII,S$GLB,, | Performed by: FAMILY MEDICINE

## 2022-09-08 PROCEDURE — 3288F PR FALLS RISK ASSESSMENT DOCUMENTED: ICD-10-PCS | Mod: CPTII,S$GLB,, | Performed by: FAMILY MEDICINE

## 2022-09-08 PROCEDURE — 83036 HEMOGLOBIN GLYCOSYLATED A1C: CPT | Performed by: FAMILY MEDICINE

## 2022-09-08 PROCEDURE — 3066F NEPHROPATHY DOC TX: CPT | Mod: CPTII,S$GLB,, | Performed by: FAMILY MEDICINE

## 2022-09-08 PROCEDURE — 4010F ACE/ARB THERAPY RXD/TAKEN: CPT | Mod: CPTII,S$GLB,, | Performed by: FAMILY MEDICINE

## 2022-09-08 PROCEDURE — 99999 PR PBB SHADOW E&M-EST. PATIENT-LVL IV: ICD-10-PCS | Mod: PBBFAC,,, | Performed by: FAMILY MEDICINE

## 2022-09-08 PROCEDURE — 3078F DIAST BP <80 MM HG: CPT | Mod: CPTII,S$GLB,, | Performed by: FAMILY MEDICINE

## 2022-09-08 PROCEDURE — 3061F PR NEG MICROALBUMINURIA RESULT DOCUMENTED/REVIEW: ICD-10-PCS | Mod: CPTII,S$GLB,, | Performed by: FAMILY MEDICINE

## 2022-09-08 PROCEDURE — 36415 COLL VENOUS BLD VENIPUNCTURE: CPT | Performed by: FAMILY MEDICINE

## 2022-09-08 PROCEDURE — 84443 ASSAY THYROID STIM HORMONE: CPT | Performed by: FAMILY MEDICINE

## 2022-09-08 PROCEDURE — 3078F PR MOST RECENT DIASTOLIC BLOOD PRESSURE < 80 MM HG: ICD-10-PCS | Mod: CPTII,S$GLB,, | Performed by: FAMILY MEDICINE

## 2022-09-08 PROCEDURE — 3008F PR BODY MASS INDEX (BMI) DOCUMENTED: ICD-10-PCS | Mod: CPTII,S$GLB,, | Performed by: FAMILY MEDICINE

## 2022-09-08 PROCEDURE — 99397 PER PM REEVAL EST PAT 65+ YR: CPT | Mod: S$GLB,,, | Performed by: FAMILY MEDICINE

## 2022-09-08 PROCEDURE — 1101F PR PT FALLS ASSESS DOC 0-1 FALLS W/OUT INJ PAST YR: ICD-10-PCS | Mod: CPTII,S$GLB,, | Performed by: FAMILY MEDICINE

## 2022-09-08 PROCEDURE — 3044F PR MOST RECENT HEMOGLOBIN A1C LEVEL <7.0%: ICD-10-PCS | Mod: CPTII,S$GLB,, | Performed by: FAMILY MEDICINE

## 2022-09-08 PROCEDURE — 3066F PR DOCUMENTATION OF TREATMENT FOR NEPHROPATHY: ICD-10-PCS | Mod: CPTII,S$GLB,, | Performed by: FAMILY MEDICINE

## 2022-09-08 PROCEDURE — 3044F HG A1C LEVEL LT 7.0%: CPT | Mod: CPTII,S$GLB,, | Performed by: FAMILY MEDICINE

## 2022-09-08 PROCEDURE — 99397 PR PREVENTIVE VISIT,EST,65 & OVER: ICD-10-PCS | Mod: S$GLB,,, | Performed by: FAMILY MEDICINE

## 2022-09-08 PROCEDURE — 99999 PR PBB SHADOW E&M-EST. PATIENT-LVL IV: CPT | Mod: PBBFAC,,, | Performed by: FAMILY MEDICINE

## 2022-09-08 PROCEDURE — 3075F PR MOST RECENT SYSTOLIC BLOOD PRESS GE 130-139MM HG: ICD-10-PCS | Mod: CPTII,S$GLB,, | Performed by: FAMILY MEDICINE

## 2022-09-08 PROCEDURE — 3061F NEG MICROALBUMINURIA REV: CPT | Mod: CPTII,S$GLB,, | Performed by: FAMILY MEDICINE

## 2022-09-08 PROCEDURE — 4010F PR ACE/ARB THEARPY RXD/TAKEN: ICD-10-PCS | Mod: CPTII,S$GLB,, | Performed by: FAMILY MEDICINE

## 2022-09-08 PROCEDURE — 3075F SYST BP GE 130 - 139MM HG: CPT | Mod: CPTII,S$GLB,, | Performed by: FAMILY MEDICINE

## 2022-09-08 RX ORDER — LISINOPRIL 30 MG/1
30 TABLET ORAL DAILY
Qty: 90 TABLET | Refills: 1 | Status: SHIPPED | OUTPATIENT
Start: 2022-09-08 | End: 2022-10-16 | Stop reason: SDUPTHER

## 2022-09-08 NOTE — PRE ADMISSION SCREENING
Pre op instructions reviewed with Pt per phone: Spoke about pre op process and surgery instructions, verbalized understanding.    To confirm, Surgery is scheduled on 9/21/22. We will call you the business day (after 2:30 pm) prior to surgery to confirm arrival time as it is subject to change due to cancellations/ emergencies.    Please report to the [facility] (1st Floor) at [facility] located off of [facility] (2nd building on the left, in front of the [facility] pole).  Address: [address] [place] [address] [place] [address]        INSTRUCTIONS IMPORTANT!!!  Do Not Eat, Drink, or Smoke after 12 midnight unless instructed otherwise by your Surgeon. OK to brush teeth, no gum, candy or mints!      *Take Only these medications with a small sip of water Morning of Surgery:  Atenolol  Synthroid    ____  Stop all Aspirin products, Ibuprofen, Advil, Motrin & Aleve at least 7 days prior to  surgery, unless otherwise instructed by your Physician office (May use Tylenol).  ____  Stop taking any Fish Oil/ Vitamins /Supplements at least 7 days prior to surgery, unless instructed otherwise by your Physician office.  ____  NO Acrylic/fake nails or nail polish worn day of surgery (specifically hand/arm & foot surgeries).  ____  NO powder, lotions, deodorants, oils or cream on body.  ____  Remove jewelry & piercings prior to surgery.  ____  Dentures, Hearing Aids & Contact Lens will need to be removed prior to the       start of surgery.  ____  Bring photo ID and insurance information to hospital (Leave Valuables at Home).  ____  If going home the same day, arrange for a ride home. You will not be able to drive 24 hrs if Anesthesia was used.   ____  Females (ages 11-60) may need to give a urine sample the morning of surgery; please see Pre op Nurse prior to using the restroom.  ____  Wear clean, loose fitting clothing to allow for dressings/ bandages.            Diabetic Patients: If you take diabetic medication, do NOT take morning  of surgery unless instructed by Doctor. Metformin to be stopped 24 hrs prior to surgery time. DO NOT take long-acting insulin the evening before surgery. Blood sugars will be checked in pre-op by Nurse.    Bathing Instructions:    -Do not shave your face or body the day before or the day of surgery unless instructed otherwise by your Surgeon.  -Do not shave pubic hair 7 days prior to surgery (gyn pt's).   -Shower & Rinse your body as usual with anti-bacterial Soap (Dial, Lever 2000,  or Hibiclens)   -Do not use Hibiclens on your face (Females should avoid genital area as well).   -Do not wash with anti-bacterial soap after you use the Hibiclens.   -Rinse & dry your body thoroughly. Apply clean clothes after shower.    [facility] Visitor/Ride Policy:  Only 2 adults allowed (over the age of 18) to accompany you to the Hospital. You Must have a ride home from a responsible adult that you know and trust. Medical Transport, Uber or Lyft can only be used if patient has a responsible adult to accompany them during ride home.    Discharge Instructions: You will receive Post-op/Discharge instructions by your Discharge Nurse prior to going home. Please call your Surgeon's office with any post-surgery questions/concerns @ [phone].    *If you are running late or have questions the morning of surgery, please call the Surgery Dept @ [phone]  *Insurance/ Financial Questions, please call [phone]    Thank you,  -[facility] Pre Admit Testing Nurse  [phone] or [phone]  M-F 7:30 am-4 pm

## 2022-09-09 ENCOUNTER — PES CALL (OUTPATIENT)
Dept: ADMINISTRATIVE | Facility: CLINIC | Age: 77
End: 2022-09-09
Payer: MEDICARE

## 2022-09-20 ENCOUNTER — ANESTHESIA EVENT (OUTPATIENT)
Dept: SURGERY | Facility: HOSPITAL | Age: 77
End: 2022-09-20
Payer: MEDICARE

## 2022-09-20 ENCOUNTER — TELEPHONE (OUTPATIENT)
Dept: PREADMISSION TESTING | Facility: HOSPITAL | Age: 77
End: 2022-09-20
Payer: MEDICARE

## 2022-09-20 NOTE — TELEPHONE ENCOUNTER
Called and spoke with Pt about the following:     Please arrive to [facility] main lobby at 7:30am on 9/21/22 for your scheduled procedure. NOTHING to eat or drink after midnight unless instructed otherwise by your Surgeon. Take only the medications instructed by your Pre Admit Nurse with small sip of water.

## 2022-09-21 ENCOUNTER — ANESTHESIA (OUTPATIENT)
Dept: SURGERY | Facility: HOSPITAL | Age: 77
End: 2022-09-21
Payer: MEDICARE

## 2022-09-21 ENCOUNTER — HOSPITAL ENCOUNTER (OUTPATIENT)
Facility: HOSPITAL | Age: 77
Discharge: HOME | End: 2022-09-21
Attending: SURGERY | Admitting: SURGERY
Payer: MEDICARE

## 2022-09-21 LAB — POCT GLUCOSE: 147 MG/DL (ref 70–110)

## 2022-09-21 PROCEDURE — 27201423 OPTIME MED/SURG SUP & DEVICES STERILE SUPPLY: Performed by: SURGERY

## 2022-09-21 PROCEDURE — 88304 TISSUE EXAM BY PATHOLOGIST: CPT | Performed by: PATHOLOGY

## 2022-09-21 PROCEDURE — 37000009 HC ANESTHESIA EA ADD 15 MINS: Performed by: SURGERY

## 2022-09-21 PROCEDURE — 36000709 HC OR TIME LEV III EA ADD 15 MIN: Performed by: SURGERY

## 2022-09-21 PROCEDURE — 25000003 PHARM REV CODE 250: Performed by: SURGERY

## 2022-09-21 PROCEDURE — 71000015 HC POSTOP RECOV 1ST HR: Performed by: SURGERY

## 2022-09-21 PROCEDURE — 88304 PR  SURG PATH,LEVEL III: ICD-10-PCS | Mod: 26,,, | Performed by: PATHOLOGY

## 2022-09-21 PROCEDURE — 25000003 PHARM REV CODE 250: Performed by: NURSE ANESTHETIST, CERTIFIED REGISTERED

## 2022-09-21 PROCEDURE — 37000008 HC ANESTHESIA 1ST 15 MINUTES: Performed by: SURGERY

## 2022-09-21 PROCEDURE — 88304 TISSUE EXAM BY PATHOLOGIST: CPT | Mod: 26,,, | Performed by: PATHOLOGY

## 2022-09-21 PROCEDURE — 71000033 HC RECOVERY, INTIAL HOUR: Performed by: SURGERY

## 2022-09-21 PROCEDURE — 63600175 PHARM REV CODE 636 W HCPCS: Performed by: SURGERY

## 2022-09-21 PROCEDURE — 47562 PR LAP,CHOLECYSTECTOMY: ICD-10-PCS | Mod: ,,, | Performed by: SURGERY

## 2022-09-21 PROCEDURE — 36000708 HC OR TIME LEV III 1ST 15 MIN: Performed by: SURGERY

## 2022-09-21 PROCEDURE — 25000003 PHARM REV CODE 250: Performed by: ANESTHESIOLOGY

## 2022-09-21 PROCEDURE — 63600175 PHARM REV CODE 636 W HCPCS: Performed by: NURSE ANESTHETIST, CERTIFIED REGISTERED

## 2022-09-21 PROCEDURE — 47562 LAPAROSCOPIC CHOLECYSTECTOMY: CPT | Mod: ,,, | Performed by: SURGERY

## 2022-09-21 RX ORDER — FENTANYL CITRATE 50 UG/ML
INJECTION, SOLUTION INTRAMUSCULAR; INTRAVENOUS
Status: DISCONTINUED | OUTPATIENT
Start: 2022-09-21 | End: 2022-09-21

## 2022-09-21 RX ORDER — HYDROCODONE BITARTRATE AND ACETAMINOPHEN 5; 325 MG/1; MG/1
1 TABLET ORAL EVERY 4 HOURS PRN
Qty: 25 TABLET | Refills: 0 | Status: SHIPPED | OUTPATIENT
Start: 2022-09-21 | End: 2022-12-08

## 2022-09-21 RX ORDER — INDOCYANINE GREEN AND WATER 25 MG
2.5 KIT INJECTION ONCE
Status: COMPLETED | OUTPATIENT
Start: 2022-09-21 | End: 2022-09-21

## 2022-09-21 RX ORDER — CEFAZOLIN SODIUM 2 G/50ML
2 SOLUTION INTRAVENOUS ONCE
Status: COMPLETED | OUTPATIENT
Start: 2022-09-21 | End: 2022-09-21

## 2022-09-21 RX ORDER — ROCURONIUM BROMIDE 10 MG/ML
INJECTION, SOLUTION INTRAVENOUS
Status: DISCONTINUED | OUTPATIENT
Start: 2022-09-21 | End: 2022-09-21

## 2022-09-21 RX ORDER — LIDOCAINE HYDROCHLORIDE 20 MG/ML
INJECTION, SOLUTION EPIDURAL; INFILTRATION; INTRACAUDAL; PERINEURAL
Status: DISCONTINUED | OUTPATIENT
Start: 2022-09-21 | End: 2022-09-21

## 2022-09-21 RX ORDER — ONDANSETRON 4 MG/1
4 TABLET, FILM COATED ORAL EVERY 6 HOURS PRN
Qty: 10 TABLET | Refills: 1 | Status: SHIPPED | OUTPATIENT
Start: 2022-09-21 | End: 2022-12-08

## 2022-09-21 RX ORDER — KETOROLAC TROMETHAMINE 30 MG/ML
15 INJECTION, SOLUTION INTRAMUSCULAR; INTRAVENOUS EVERY 8 HOURS PRN
Status: DISCONTINUED | OUTPATIENT
Start: 2022-09-21 | End: 2022-09-21 | Stop reason: HOSPADM

## 2022-09-21 RX ORDER — SUCCINYLCHOLINE CHLORIDE 20 MG/ML
INJECTION INTRAMUSCULAR; INTRAVENOUS
Status: DISCONTINUED | OUTPATIENT
Start: 2022-09-21 | End: 2022-09-21

## 2022-09-21 RX ORDER — HYDROMORPHONE HYDROCHLORIDE 2 MG/ML
0.2 INJECTION, SOLUTION INTRAMUSCULAR; INTRAVENOUS; SUBCUTANEOUS EVERY 5 MIN PRN
Status: DISCONTINUED | OUTPATIENT
Start: 2022-09-21 | End: 2022-09-21 | Stop reason: HOSPADM

## 2022-09-21 RX ORDER — IBUPROFEN 600 MG/1
600 TABLET ORAL EVERY 6 HOURS PRN
Qty: 25 TABLET | Refills: 0 | Status: SHIPPED | OUTPATIENT
Start: 2022-09-21 | End: 2022-12-08

## 2022-09-21 RX ORDER — PHENYLEPHRINE HYDROCHLORIDE 10 MG/ML
INJECTION INTRAVENOUS
Status: DISCONTINUED | OUTPATIENT
Start: 2022-09-21 | End: 2022-09-21

## 2022-09-21 RX ORDER — DOCUSATE SODIUM 100 MG/1
100 CAPSULE, LIQUID FILLED ORAL 2 TIMES DAILY
Qty: 60 CAPSULE | Refills: 1 | Status: SHIPPED | OUTPATIENT
Start: 2022-09-21 | End: 2022-12-08

## 2022-09-21 RX ORDER — BUPIVACAINE HYDROCHLORIDE 2.5 MG/ML
INJECTION, SOLUTION EPIDURAL; INFILTRATION; INTRACAUDAL
Status: DISCONTINUED | OUTPATIENT
Start: 2022-09-21 | End: 2022-09-21 | Stop reason: HOSPADM

## 2022-09-21 RX ORDER — PROPOFOL 10 MG/ML
VIAL (ML) INTRAVENOUS
Status: DISCONTINUED | OUTPATIENT
Start: 2022-09-21 | End: 2022-09-21

## 2022-09-21 RX ORDER — DEXAMETHASONE SODIUM PHOSPHATE 4 MG/ML
INJECTION, SOLUTION INTRA-ARTICULAR; INTRALESIONAL; INTRAMUSCULAR; INTRAVENOUS; SOFT TISSUE
Status: DISCONTINUED | OUTPATIENT
Start: 2022-09-21 | End: 2022-09-21

## 2022-09-21 RX ORDER — ONDANSETRON HYDROCHLORIDE 2 MG/ML
INJECTION, SOLUTION INTRAMUSCULAR; INTRAVENOUS
Status: DISCONTINUED | OUTPATIENT
Start: 2022-09-21 | End: 2022-09-21

## 2022-09-21 RX ORDER — METHOCARBAMOL 750 MG/1
750 TABLET, FILM COATED ORAL EVERY 8 HOURS PRN
Qty: 21 TABLET | Refills: 1 | Status: SHIPPED | OUTPATIENT
Start: 2022-09-21 | End: 2022-12-08

## 2022-09-21 RX ORDER — OXYCODONE AND ACETAMINOPHEN 5; 325 MG/1; MG/1
1 TABLET ORAL
Status: DISCONTINUED | OUTPATIENT
Start: 2022-09-21 | End: 2022-09-21 | Stop reason: HOSPADM

## 2022-09-21 RX ORDER — ONDANSETRON 2 MG/ML
4 INJECTION INTRAMUSCULAR; INTRAVENOUS DAILY PRN
Status: DISCONTINUED | OUTPATIENT
Start: 2022-09-21 | End: 2022-09-21 | Stop reason: HOSPADM

## 2022-09-21 RX ADMIN — FENTANYL CITRATE 25 MCG: 50 INJECTION, SOLUTION INTRAMUSCULAR; INTRAVENOUS at 10:09

## 2022-09-21 RX ADMIN — SUCCINYLCHOLINE CHLORIDE 120 MG: 20 INJECTION, SOLUTION INTRAMUSCULAR; INTRAVENOUS at 10:09

## 2022-09-21 RX ADMIN — CEFAZOLIN SODIUM 2 G: 2 SOLUTION INTRAVENOUS at 10:09

## 2022-09-21 RX ADMIN — DEXAMETHASONE SODIUM PHOSPHATE 4 MG: 4 INJECTION, SOLUTION INTRAMUSCULAR; INTRAVENOUS at 10:09

## 2022-09-21 RX ADMIN — ONDANSETRON 4 MG: 2 INJECTION, SOLUTION INTRAMUSCULAR; INTRAVENOUS at 10:09

## 2022-09-21 RX ADMIN — ROCURONIUM BROMIDE 45 MG: 10 INJECTION, SOLUTION INTRAVENOUS at 10:09

## 2022-09-21 RX ADMIN — SODIUM CHLORIDE, SODIUM LACTATE, POTASSIUM CHLORIDE, AND CALCIUM CHLORIDE: .6; .31; .03; .02 INJECTION, SOLUTION INTRAVENOUS at 09:09

## 2022-09-21 RX ADMIN — ROCURONIUM BROMIDE 5 MG: 10 INJECTION, SOLUTION INTRAVENOUS at 09:09

## 2022-09-21 RX ADMIN — FENTANYL CITRATE 75 MCG: 50 INJECTION, SOLUTION INTRAMUSCULAR; INTRAVENOUS at 09:09

## 2022-09-21 RX ADMIN — PROPOFOL 50 MG: 10 INJECTION, EMULSION INTRAVENOUS at 10:09

## 2022-09-21 RX ADMIN — LIDOCAINE HYDROCHLORIDE 100 MG: 20 INJECTION, SOLUTION EPIDURAL; INFILTRATION; INTRACAUDAL; PERINEURAL at 09:09

## 2022-09-21 RX ADMIN — OXYCODONE HYDROCHLORIDE AND ACETAMINOPHEN 1 TABLET: 5; 325 TABLET ORAL at 11:09

## 2022-09-21 RX ADMIN — PHENYLEPHRINE HYDROCHLORIDE 100 MCG: 10 INJECTION INTRAVENOUS at 10:09

## 2022-09-21 RX ADMIN — INDOCYANINE GREEN 2.5 MG: KIT INTRAVENOUS at 10:09

## 2022-09-21 RX ADMIN — PROPOFOL 150 MG: 10 INJECTION, EMULSION INTRAVENOUS at 09:09

## 2022-09-21 RX ADMIN — SUGAMMADEX 200 MG: 100 INJECTION, SOLUTION INTRAVENOUS at 10:09

## 2022-09-21 NOTE — PLAN OF CARE
Patient  ready for surgery. Family at bedside. Belongings given to family to secure. Patient instructed on Preop Process verbalizes understanding.

## 2022-09-21 NOTE — TRANSFER OF CARE
"Anesthesia Transfer of Care Note    Patient: Patsy Perez    Procedure(s) Performed: Procedure(s) (LRB):  CHOLECYSTECTOMY, LAPAROSCOPIC (N/A)    Patient location: PACU    Anesthesia Type: general    Transport from OR: Transported from OR on room air with adequate spontaneous ventilation    Post pain: adequate analgesia    Post assessment: no apparent anesthetic complications    Post vital signs: stable    Level of consciousness: responds to stimulation    Nausea/Vomiting: no nausea/vomiting    Complications: none    Transfer of care protocol was followed      Last vitals:   Visit Vitals  BP (!) 182/76 (BP Location: Right arm, Patient Position: Sitting)   Pulse 60   Temp 36.9 °C (98.4 °F) (Temporal)   Resp 18   Ht 5' 5" (1.651 m)   Wt 80.6 kg (177 lb 11.1 oz)   SpO2 100%   Breastfeeding No   BMI 29.57 kg/m²     "

## 2022-09-21 NOTE — DISCHARGE SUMMARY
[facility] - Surgery (Hospital)  Discharge Note  Short Stay    Procedure(s) (LRB):  CHOLECYSTECTOMY, LAPAROSCOPIC (N/A)    OUTCOME: Patient tolerated treatment/procedure well without complication and is now ready for discharge.    DISPOSITION: Home or Self Care    FINAL DIAGNOSIS:  <principal problem not specified>    FOLLOWUP: In clinic    DISCHARGE INSTRUCTIONS:  No discharge procedures on file.     TIME SPENT ON DISCHARGE: 5 minutes

## 2022-09-21 NOTE — PATIENT INSTRUCTIONS
Discharge Instructions    Hygiene and incision care:   You may shower but do not soak such as in a bathtub or pool. Your incisions are closed with absorbable sutures and there is surgical glue on top. The glue will eventually flake off on its own. Do not scrub your incisions, just allow warm soapy water to run over them.   If you develop fevers, worsening redness and/or pus-like drainage, call the office immediately.    Pain control:   You may take Tylenol (650 mg every 4 hours) and ibuprofen (600 mg every 6 hours) as well as alternate heat and cold packs for pain and swelling. Take ibuprofen with food as it can cause stomach upset and ulcers. Do not take ibuprofen if you have an increased risk of bleeding, history of stomach ulcers, are already taking an NSAID, or have kidney issues.   If taking narcotic pain medication, such as Norco (hydrocodone-acetaminophen) or Percocet (oxycodone-acetaminophen), do not drink alcohol or drive. Each Norco and Percocet tablet contains 325 mg of Tylenol; do not take more than 4000 mg of Tylenol per day. Narcotic pain medications can be constipating so be sure to drink plenty of water and take an over the counter stool softener such as colace (100 mg twice a day) or miralax (17 g once a day).    Activity:   No heavy lifting >15 lbs for 4 weeks while your incisions are healing as it might result in a hernia. Avoid straining, pushing, and pulling. It is okay to walk and slowly go up and down stairs.   Make sure to do leg and ankle exercises and take deep breaths frequently to avoid developing blood clots or pneumonia.    Diet:   You may resume your regular diet. Some people find it best to stick to soft, bland, and easily digestible foods for the first couple of days while the anesthesia is leaving their system or if they're taking narcotic pain medicine to avoid nausea and vomiting. Be sure to eat good, nutritious foods such as vegetables and lean proteins to give your body the  nutrients it needs to heal. I recommend also taking vitamin C as this can aid with wound healing.    For any questions or concerns, please do not hesitate to contact the office any time at [phone] or send me a [employer] message.

## 2022-09-21 NOTE — PLAN OF CARE
Discharge instructions discussed with patient and patient's spouse, both verbalized understanding.

## 2022-09-21 NOTE — ANESTHESIA PROCEDURE NOTES
Intubation    Date/Time: 9/21/2022 10:02 AM  Performed by: Sigrid Gupta CRNA  Authorized by: Nino Calles MD     Intubation:     Induction:  Intravenous    Intubated:  Postinduction    Mask Ventilation:  Easy mask    Attempts:  3    Attempted By:  Student    Method of Intubation:  Direct    Blade:  Pratima 3    Laryngeal View Grade: Grade III - only epiglottis visible      Attempted By (2nd Attempt):  Student    Method of Intubation (2nd Attempt):  Direct    Blade (2nd Attempt):  Lu 2    Laryngeal View Grade (2nd Attempt): Grade IIa - cords partially seen      Attempted By (3rd Attempt):  CRNA    Method of Intubation (3rd Attempt):  Direct    Blade (3rd Attempt):  Lu 2    Laryngeal View Grade (3rd Attempt): Grade I - full view of cords      Difficult Airway Encountered?: No      Complications:  None    Airway Device:  Oral endotracheal tube    Airway Device Size:  7.0    Style/Cuff Inflation:  Cuffed (inflated to minimal occlusive pressure)    Tube secured:  22    Placement Verified By:  Capnometry    Complicating Factors:  Anterior larynx    Findings Post-Intubation:  BS equal bilateral and atraumatic/condition of teeth unchanged (lips/mucosa intact post intubation)

## 2022-09-21 NOTE — ANESTHESIA POSTPROCEDURE EVALUATION
Anesthesia Post Evaluation    Patient: Patsy Perez    Procedure(s) Performed: Procedure(s) (LRB):  CHOLECYSTECTOMY, LAPAROSCOPIC (N/A)    Final Anesthesia Type: general      Patient location during evaluation: PACU  Patient participation: Yes- Able to Participate  Level of consciousness: awake  Post-procedure vital signs: reviewed and stable  Pain management: adequate  Airway patency: patent    PONV status at discharge: No PONV  Anesthetic complications: no      Cardiovascular status: hemodynamically stable  Respiratory status: unassisted  Hydration status: euvolemic  Follow-up not needed.          Vitals Value Taken Time   /60 09/21/22 1148   Temp 36.7 °C (98 °F) 09/21/22 1108   Pulse 61 09/21/22 1150   Resp 24 09/21/22 1150   SpO2 92 % 09/21/22 1150   Vitals shown include unvalidated device data.      Event Time   Out of Recovery 11:52:00         Pain/Emmanuelle Score: Pain Rating Prior to Med Admin: 4 (9/21/2022 11:35 AM)  Emmanuelle Score: 10 (9/21/2022 11:45 AM)

## 2022-09-21 NOTE — ANESTHESIA PREPROCEDURE EVALUATION
09/21/2022  Patsy Perez is a 76 y.o., female.    Patient Active Problem List   Diagnosis    Hypothyroidism    Hypertension    HLD (hyperlipidemia)    Gallstones    Fatty liver    DM II (diabetes mellitus, type II), controlled    CKD (chronic kidney disease)       Pre-op Assessment    I have reviewed the Patient Summary Reports.    I have reviewed the NPO Status.   I have reviewed the Medications.     Review of Systems  Anesthesia Hx:  No problems with previous Anesthesia    Social:  Non-Smoker    Hematology/Oncology:  Hematology Normal        Cardiovascular:   Hypertension hyperlipidemia ECG has been reviewed.    Pulmonary:  Pulmonary Normal    Renal/:   Chronic Renal Disease    Hepatic/GI:  Hepatic/GI Normal Fatty liver   Neurological:  Neurology Normal    Endocrine:   Diabetes Hypothyroidism        Physical Exam  General: Well nourished    Airway:  Mallampati: II   Mouth Opening: Normal  TM Distance: Normal  Neck ROM: Normal ROM    Dental:  Intact        Anesthesia Plan  Type of Anesthesia, risks & benefits discussed:    Anesthesia Type: Gen ETT  Intra-op Monitoring Plan: Standard ASA Monitors  Post Op Pain Control Plan: multimodal analgesia  Induction:  IV  Airway Plan: , Post-Induction  Informed Consent: Informed consent signed with the Patient and all parties understand the risks and agree with anesthesia plan.  All questions answered.   ASA Score: 2    Ready For Surgery From Anesthesia Perspective.     .      Chemistry        Component Value Date/Time     (L) 08/30/2022 0934    K 4.1 08/30/2022 0934    CL 99 08/30/2022 0934    CO2 24 08/30/2022 0934    BUN 18 08/30/2022 0934    CREATININE 1.0 08/30/2022 0934     (H) 08/30/2022 0934        Component Value Date/Time    CALCIUM 10.0 08/30/2022 0934    ALKPHOS 63 08/30/2022 0934    AST 19 08/30/2022 0934    ALT 22 08/30/2022 0934     BILITOT 0.3 08/30/2022 0934    ESTGFRAFRICA 43 (A) 06/08/2022 0837    EGFRNONAA 37 (A) 06/08/2022 0837        Lab Results   Component Value Date    WBC 10.25 08/30/2022    HGB 13.0 08/30/2022    HCT 37.6 08/30/2022    MCV 85 08/30/2022     08/30/2022

## 2022-09-21 NOTE — OP NOTE
Patsy Perez  : 1945, MRN: [id number]  Date of procedure: 2022      Procedure: Laparoscopic cholecystectomy with indocyanine cholangiography    Pre-procedure diagnosis: Biliary dyskinesia, choledocholithiasis  Post-procedure diagnosis: Same  Surgeon: Roslyn Mckeon DO  Assistant: None  EBL: 5 mL  Specimen: Gallbladder  Drains: None  Complications: None apparent    Significant findings: Distended gallbladder    Indications for procedure: The patient presents with a reduced gallbladder ejection and history of choledocholithiasis. After explaining the risks, benefits, and alternatives, the patient verbalized understanding and informed written consent was obtained.    Description of procedure: The patient was brought to the OR and placed in the supine position. SCDs, safety straps, and a footboard were applied. After general anesthesia was induced by the Anesthesia Department, the abdomen was prepped and draped in usual sterile fashion. A time out was taken to identify the correct patient, correct procedure, and correct anatomical site; all parties were in agreement.  Local anesthetic was injected into the skin. A Veress needle was inserted at the umbilicus; the peritoneal cavity was thus insufflated to 15 mm Hg. A 5 mm umbilical incision was made, using a 5 mm optiview trochar, the peritoneal cavity was entered under direct visualization. No damage to underlying structures had occurred upon entry. An 8 mm subxiphoid and a 5 mm right upper quadrant ports were placed under direct visualization. The patient was placed in reverse Trendelenburg and rotated to the left.  A mini lap grasper was inserted under direct visualization in the right upper lateral abdomen and used to grasp the gallbladder fundus. The gallbladder was approached by first grasping the fundus and elevating it cephalad. There were omental attachments that were were taken down both bluntly and using careful electrocautery. Using careful  blunt dissection, a window was created beneath the gallbladder neck to expose the cystic duct and artery. Using electrocautery, I then began taking down the lateral peritoneal attachments to the gallbladder so that a clear view of the liver bed was visualized behind the cystic structures. The critical view of safety was achieved with an obvious, single duct and artery exiting and entering the gallbladder. This was confirmed with indocyanine green cholangiography which delineated the cystic duct branching off of the common bile duct. The cystic duct and artery were clipped with 3 clips proximally, 1 distally, and transected in the middle using endoshears. The gallbladder was dissected off of the liver bed using electrocautery, placed into an endocatch bag, and removed through the 8 mm port site. The area was irrigated with saline. The liver bed was inspected and good hemostasis was observed. The clips on the cystic duct and artery were inspected and found to be intact. The indocyanine green fluorescence demonstrated no leakage of bile from the liver bed or from the clipped cystic duct.   The abdomen was desufflated. All incisions were closed with 4-0 monocryl in the subcuticular layer and dermabond was applied on top.    All sponge and instrument counts were deemed correct. The patient appeared to tolerate the procedure well and there were no apparent complications. The patient was transported to PACU in stable condition.    Roslyn Mckeon,   General Surgery  [facility]  9/21/2022

## 2022-09-26 LAB
FINAL PATHOLOGIC DIAGNOSIS: NORMAL
GROSS: NORMAL
Lab: NORMAL

## 2022-09-27 VITALS
HEART RATE: 61 BPM | RESPIRATION RATE: 15 BRPM | BODY MASS INDEX: 29.61 KG/M2 | DIASTOLIC BLOOD PRESSURE: 61 MMHG | TEMPERATURE: 98 F | HEIGHT: 65 IN | SYSTOLIC BLOOD PRESSURE: 134 MMHG | WEIGHT: 177.69 LBS | OXYGEN SATURATION: 91 %

## 2022-10-04 ENCOUNTER — OFFICE VISIT (OUTPATIENT)
Dept: SURGERY | Facility: CLINIC | Age: 77
End: 2022-10-04
Payer: MEDICARE

## 2022-10-04 VITALS
SYSTOLIC BLOOD PRESSURE: 133 MMHG | BODY MASS INDEX: 29.76 KG/M2 | HEART RATE: 66 BPM | DIASTOLIC BLOOD PRESSURE: 72 MMHG | WEIGHT: 178.81 LBS

## 2022-10-04 DIAGNOSIS — K82.8 BILIARY DYSKINESIA: Primary | ICD-10-CM

## 2022-10-04 PROCEDURE — 3061F NEG MICROALBUMINURIA REV: CPT | Mod: CPTII,S$GLB,, | Performed by: SURGERY

## 2022-10-04 PROCEDURE — 99999 PR PBB SHADOW E&M-EST. PATIENT-LVL III: ICD-10-PCS | Mod: PBBFAC,,, | Performed by: SURGERY

## 2022-10-04 PROCEDURE — 99999 PR PBB SHADOW E&M-EST. PATIENT-LVL III: CPT | Mod: PBBFAC,,, | Performed by: SURGERY

## 2022-10-04 PROCEDURE — 99024 POSTOP FOLLOW-UP VISIT: CPT | Mod: S$GLB,,, | Performed by: SURGERY

## 2022-10-04 PROCEDURE — 3061F PR NEG MICROALBUMINURIA RESULT DOCUMENTED/REVIEW: ICD-10-PCS | Mod: CPTII,S$GLB,, | Performed by: SURGERY

## 2022-10-04 PROCEDURE — 3075F SYST BP GE 130 - 139MM HG: CPT | Mod: CPTII,S$GLB,, | Performed by: SURGERY

## 2022-10-04 PROCEDURE — 4010F ACE/ARB THERAPY RXD/TAKEN: CPT | Mod: CPTII,S$GLB,, | Performed by: SURGERY

## 2022-10-04 PROCEDURE — 3078F DIAST BP <80 MM HG: CPT | Mod: CPTII,S$GLB,, | Performed by: SURGERY

## 2022-10-04 PROCEDURE — 3078F PR MOST RECENT DIASTOLIC BLOOD PRESSURE < 80 MM HG: ICD-10-PCS | Mod: CPTII,S$GLB,, | Performed by: SURGERY

## 2022-10-04 PROCEDURE — 99024 PR POST-OP FOLLOW-UP VISIT: ICD-10-PCS | Mod: S$GLB,,, | Performed by: SURGERY

## 2022-10-04 PROCEDURE — 3008F PR BODY MASS INDEX (BMI) DOCUMENTED: ICD-10-PCS | Mod: CPTII,S$GLB,, | Performed by: SURGERY

## 2022-10-04 PROCEDURE — 1159F MED LIST DOCD IN RCRD: CPT | Mod: CPTII,S$GLB,, | Performed by: SURGERY

## 2022-10-04 PROCEDURE — 1126F PR PAIN SEVERITY QUANTIFIED, NO PAIN PRESENT: ICD-10-PCS | Mod: CPTII,S$GLB,, | Performed by: SURGERY

## 2022-10-04 PROCEDURE — 3008F BODY MASS INDEX DOCD: CPT | Mod: CPTII,S$GLB,, | Performed by: SURGERY

## 2022-10-04 PROCEDURE — 3044F PR MOST RECENT HEMOGLOBIN A1C LEVEL <7.0%: ICD-10-PCS | Mod: CPTII,S$GLB,, | Performed by: SURGERY

## 2022-10-04 PROCEDURE — 1126F AMNT PAIN NOTED NONE PRSNT: CPT | Mod: CPTII,S$GLB,, | Performed by: SURGERY

## 2022-10-04 PROCEDURE — 4010F PR ACE/ARB THEARPY RXD/TAKEN: ICD-10-PCS | Mod: CPTII,S$GLB,, | Performed by: SURGERY

## 2022-10-04 PROCEDURE — 1159F PR MEDICATION LIST DOCUMENTED IN MEDICAL RECORD: ICD-10-PCS | Mod: CPTII,S$GLB,, | Performed by: SURGERY

## 2022-10-04 PROCEDURE — 3044F HG A1C LEVEL LT 7.0%: CPT | Mod: CPTII,S$GLB,, | Performed by: SURGERY

## 2022-10-04 PROCEDURE — 3066F PR DOCUMENTATION OF TREATMENT FOR NEPHROPATHY: ICD-10-PCS | Mod: CPTII,S$GLB,, | Performed by: SURGERY

## 2022-10-04 PROCEDURE — 3066F NEPHROPATHY DOC TX: CPT | Mod: CPTII,S$GLB,, | Performed by: SURGERY

## 2022-10-04 PROCEDURE — 3075F PR MOST RECENT SYSTOLIC BLOOD PRESS GE 130-139MM HG: ICD-10-PCS | Mod: CPTII,S$GLB,, | Performed by: SURGERY

## 2022-10-13 RX ORDER — GLIMEPIRIDE 2 MG/1
1 TABLET ORAL
Qty: 90 TABLET | Refills: 1 | Status: SHIPPED | OUTPATIENT
Start: 2022-10-13 | End: 2022-10-16 | Stop reason: SDUPTHER

## 2022-10-13 NOTE — TELEPHONE ENCOUNTER
No new care gaps identified.  [facility] Embedded Care Gaps. Reference number: [id number]. 10/13/2022   12:27:19 PM CDT

## 2022-10-13 NOTE — TELEPHONE ENCOUNTER
Refill Decision Note   Patsy Perez  is requesting a refill authorization.  Brief Assessment and Rationale for Refill:  Approve     Medication Therapy Plan:       Medication Reconciliation Completed: No   Comments:     No Care Gaps recommended.     Note composed:1:35 PM 10/13/2022

## 2022-11-10 ENCOUNTER — APPOINTMENT (OUTPATIENT)
Dept: RADIOLOGY | Facility: HOSPITAL | Age: 77
End: 2022-11-10
Attending: FAMILY MEDICINE
Payer: MEDICARE

## 2022-11-10 DIAGNOSIS — Z78.0 MENOPAUSE: ICD-10-CM

## 2022-11-10 PROCEDURE — 77080 DXA BONE DENSITY AXIAL: CPT | Mod: TC

## 2022-11-10 PROCEDURE — 77080 DXA BONE DENSITY AXIAL: CPT | Mod: 26,,, | Performed by: RADIOLOGY

## 2022-11-10 PROCEDURE — 77080 DEXA BONE DENSITY SPINE HIP: ICD-10-PCS | Mod: 26,,, | Performed by: RADIOLOGY

## 2022-11-27 NOTE — PROGRESS NOTES
Patsy Perez presents for follow up after undergoing laparoscopic cholecystectomy. Patient states that overall they are doing well. Discomfort is minimal to none. Denies fevers, issues urinating, diarrhea. Tolerating a diet. Incisions are healing well without signs of infection. Pathology reviewed.  Continue no heavy lifting or strenuous activity for another 2 weeks. Follow up prn. Patient expressed understanding and is in agreement.    Roslyn Mckeon, DO  General Surgery  [facility]

## 2022-12-08 ENCOUNTER — HOSPITAL ENCOUNTER (OUTPATIENT)
Dept: RADIOLOGY | Facility: HOSPITAL | Age: 77
Discharge: HOME | End: 2022-12-08
Attending: FAMILY MEDICINE
Payer: MEDICARE

## 2022-12-08 ENCOUNTER — OFFICE VISIT (OUTPATIENT)
Dept: INTERNAL MEDICINE | Facility: CLINIC | Age: 77
End: 2022-12-08
Payer: MEDICARE

## 2022-12-08 VITALS
SYSTOLIC BLOOD PRESSURE: 128 MMHG | WEIGHT: 172.06 LBS | RESPIRATION RATE: 18 BRPM | OXYGEN SATURATION: 100 % | DIASTOLIC BLOOD PRESSURE: 72 MMHG | BODY MASS INDEX: 28.67 KG/M2 | HEIGHT: 65 IN | TEMPERATURE: 98 F | HEART RATE: 67 BPM

## 2022-12-08 DIAGNOSIS — N18.31 STAGE 3A CHRONIC KIDNEY DISEASE: ICD-10-CM

## 2022-12-08 DIAGNOSIS — E03.9 HYPOTHYROIDISM, UNSPECIFIED TYPE: ICD-10-CM

## 2022-12-08 DIAGNOSIS — E11.8 CONTROLLED TYPE 2 DIABETES MELLITUS WITH COMPLICATION, WITHOUT LONG-TERM CURRENT USE OF INSULIN: Primary | ICD-10-CM

## 2022-12-08 DIAGNOSIS — E87.1 HYPONATREMIA: ICD-10-CM

## 2022-12-08 DIAGNOSIS — M54.32 SCIATICA OF LEFT SIDE: ICD-10-CM

## 2022-12-08 DIAGNOSIS — I10 HYPERTENSION, UNSPECIFIED TYPE: ICD-10-CM

## 2022-12-08 DIAGNOSIS — E78.5 HYPERLIPIDEMIA, UNSPECIFIED HYPERLIPIDEMIA TYPE: ICD-10-CM

## 2022-12-08 PROCEDURE — 3074F SYST BP LT 130 MM HG: CPT | Mod: HCNC,CPTII,S$GLB, | Performed by: FAMILY MEDICINE

## 2022-12-08 PROCEDURE — 3078F PR MOST RECENT DIASTOLIC BLOOD PRESSURE < 80 MM HG: ICD-10-PCS | Mod: HCNC,CPTII,S$GLB, | Performed by: FAMILY MEDICINE

## 2022-12-08 PROCEDURE — 4010F PR ACE/ARB THEARPY RXD/TAKEN: ICD-10-PCS | Mod: HCNC,CPTII,S$GLB, | Performed by: FAMILY MEDICINE

## 2022-12-08 PROCEDURE — 1101F PT FALLS ASSESS-DOCD LE1/YR: CPT | Mod: HCNC,CPTII,S$GLB, | Performed by: FAMILY MEDICINE

## 2022-12-08 PROCEDURE — 99999 PR PBB SHADOW E&M-EST. PATIENT-LVL V: CPT | Mod: PBBFAC,HCNC,, | Performed by: FAMILY MEDICINE

## 2022-12-08 PROCEDURE — 99214 PR OFFICE/OUTPT VISIT, EST, LEVL IV, 30-39 MIN: ICD-10-PCS | Mod: HCNC,S$GLB,, | Performed by: FAMILY MEDICINE

## 2022-12-08 PROCEDURE — 3044F HG A1C LEVEL LT 7.0%: CPT | Mod: HCNC,CPTII,S$GLB, | Performed by: FAMILY MEDICINE

## 2022-12-08 PROCEDURE — 1159F MED LIST DOCD IN RCRD: CPT | Mod: HCNC,CPTII,S$GLB, | Performed by: FAMILY MEDICINE

## 2022-12-08 PROCEDURE — 1101F PR PT FALLS ASSESS DOC 0-1 FALLS W/OUT INJ PAST YR: ICD-10-PCS | Mod: HCNC,CPTII,S$GLB, | Performed by: FAMILY MEDICINE

## 2022-12-08 PROCEDURE — 3008F PR BODY MASS INDEX (BMI) DOCUMENTED: ICD-10-PCS | Mod: HCNC,CPTII,S$GLB, | Performed by: FAMILY MEDICINE

## 2022-12-08 PROCEDURE — 99214 OFFICE O/P EST MOD 30 MIN: CPT | Mod: HCNC,S$GLB,, | Performed by: FAMILY MEDICINE

## 2022-12-08 PROCEDURE — 3288F PR FALLS RISK ASSESSMENT DOCUMENTED: ICD-10-PCS | Mod: HCNC,CPTII,S$GLB, | Performed by: FAMILY MEDICINE

## 2022-12-08 PROCEDURE — 1125F PR PAIN SEVERITY QUANTIFIED, PAIN PRESENT: ICD-10-PCS | Mod: HCNC,CPTII,S$GLB, | Performed by: FAMILY MEDICINE

## 2022-12-08 PROCEDURE — 72110 X-RAY EXAM L-2 SPINE 4/>VWS: CPT | Mod: TC,HCNC

## 2022-12-08 PROCEDURE — 3061F PR NEG MICROALBUMINURIA RESULT DOCUMENTED/REVIEW: ICD-10-PCS | Mod: HCNC,CPTII,S$GLB, | Performed by: FAMILY MEDICINE

## 2022-12-08 PROCEDURE — 99999 PR PBB SHADOW E&M-EST. PATIENT-LVL V: ICD-10-PCS | Mod: PBBFAC,HCNC,, | Performed by: FAMILY MEDICINE

## 2022-12-08 PROCEDURE — 3066F PR DOCUMENTATION OF TREATMENT FOR NEPHROPATHY: ICD-10-PCS | Mod: HCNC,CPTII,S$GLB, | Performed by: FAMILY MEDICINE

## 2022-12-08 PROCEDURE — 3008F BODY MASS INDEX DOCD: CPT | Mod: HCNC,CPTII,S$GLB, | Performed by: FAMILY MEDICINE

## 2022-12-08 PROCEDURE — 72110 X-RAY EXAM L-2 SPINE 4/>VWS: CPT | Mod: 26,HCNC,, | Performed by: RADIOLOGY

## 2022-12-08 PROCEDURE — 3078F DIAST BP <80 MM HG: CPT | Mod: HCNC,CPTII,S$GLB, | Performed by: FAMILY MEDICINE

## 2022-12-08 PROCEDURE — 1125F AMNT PAIN NOTED PAIN PRSNT: CPT | Mod: HCNC,CPTII,S$GLB, | Performed by: FAMILY MEDICINE

## 2022-12-08 PROCEDURE — 3044F PR MOST RECENT HEMOGLOBIN A1C LEVEL <7.0%: ICD-10-PCS | Mod: HCNC,CPTII,S$GLB, | Performed by: FAMILY MEDICINE

## 2022-12-08 PROCEDURE — 72110 XR LUMBAR SPINE COMPLETE 5 VIEW: ICD-10-PCS | Mod: 26,HCNC,, | Performed by: RADIOLOGY

## 2022-12-08 PROCEDURE — 3066F NEPHROPATHY DOC TX: CPT | Mod: HCNC,CPTII,S$GLB, | Performed by: FAMILY MEDICINE

## 2022-12-08 PROCEDURE — 4010F ACE/ARB THERAPY RXD/TAKEN: CPT | Mod: HCNC,CPTII,S$GLB, | Performed by: FAMILY MEDICINE

## 2022-12-08 PROCEDURE — 3074F PR MOST RECENT SYSTOLIC BLOOD PRESSURE < 130 MM HG: ICD-10-PCS | Mod: HCNC,CPTII,S$GLB, | Performed by: FAMILY MEDICINE

## 2022-12-08 PROCEDURE — 3288F FALL RISK ASSESSMENT DOCD: CPT | Mod: HCNC,CPTII,S$GLB, | Performed by: FAMILY MEDICINE

## 2022-12-08 PROCEDURE — 1159F PR MEDICATION LIST DOCUMENTED IN MEDICAL RECORD: ICD-10-PCS | Mod: HCNC,CPTII,S$GLB, | Performed by: FAMILY MEDICINE

## 2022-12-08 PROCEDURE — 3061F NEG MICROALBUMINURIA REV: CPT | Mod: HCNC,CPTII,S$GLB, | Performed by: FAMILY MEDICINE

## 2022-12-08 RX ORDER — GABAPENTIN 100 MG/1
100 CAPSULE ORAL 3 TIMES DAILY
Qty: 90 CAPSULE | Refills: 1 | Status: SHIPPED | OUTPATIENT
Start: 2022-12-08 | End: 2023-06-15

## 2022-12-08 NOTE — PROGRESS NOTES
Subjective:       Patient ID: Patsy Perez is a 76 y.o. female.    Chief Complaint: Hip Pain    Back Pain      Patient Active Problem List   Diagnosis    Hypothyroidism    Hypertension    HLD (hyperlipidemia)    Gallstones    Fatty liver    DM II (diabetes mellitus, type II), controlled    CKD (chronic kidney disease)       Past Medical History:   Diagnosis Date    CKD (chronic kidney disease)     DM II (diabetes mellitus, type II), controlled     Fatty liver     Gallstones     found on US    HLD (hyperlipidemia)     Hypertension     Hypothyroidism     s/p thyroidectomy       Past Surgical History:   Procedure Laterality Date    APPENDECTOMY      1985    HYSTERECTOMY      2009 - Dr. Thorne [facility]    LAPAROSCOPIC CHOLECYSTECTOMY N/A 9/21/2022    Procedure: CHOLECYSTECTOMY, LAPAROSCOPIC;  Surgeon: Roslyn Mckeon DO;  Location: [facility] OR;  Service: General;  Laterality: N/A;    THYROIDECTOMY      1985       Family History   Problem Relation Age of Onset    Diabetes Mother     Breast cancer Mother         late 70s    Osteoarthritis Mother         double knee replacement    Heart disease Father        Social History     Tobacco Use   Smoking Status Former   Smokeless Tobacco Never       Wt Readings from Last 5 Encounters:   12/08/22 78 kg (172 lb 1.1 oz)   10/04/22 81.1 kg (178 lb 13 oz)   09/21/22 80.6 kg (177 lb 11.1 oz)   09/08/22 80.1 kg (176 lb 9.4 oz)   08/30/22 81 kg (178 lb 9.2 oz)       For further HPI details, see assessment and plan.    Review of Systems    Objective:      Vitals:    12/08/22 1403   BP: 128/72   Pulse: 67   Resp: 18   Temp: 98.2 °F (36.8 °C)       Physical Exam  Constitutional:       General: She is not in acute distress.     Appearance: She is not ill-appearing.   Pulmonary:      Effort: Pulmonary effort is normal. No respiratory distress.   Neurological:      General: No focal deficit present.      Mental Status: She is alert.   Psychiatric:         Mood and Affect: Mood  normal.         Behavior: Behavior normal.       Assessment:       1. Controlled type 2 diabetes mellitus with complication, without long-term current use of insulin    2. Hypothyroidism, unspecified type    3. Hypertension, unspecified type    4. Stage 3a chronic kidney disease    5. Hyponatremia    6. Hyperlipidemia, unspecified hyperlipidemia type        Plan:   Controlled type 2 diabetes mellitus with complication, without long-term current use of insulin  -     Hemoglobin A1C; Future; Expected date: 12/08/2022  -     Comprehensive Metabolic Panel; Future; Expected date: 12/08/2022    Hypothyroidism, unspecified type    Hypertension, unspecified type    Stage 3a chronic kidney disease    Hyponatremia    Hyperlipidemia, unspecified hyperlipidemia type      DM   Has noted sugars down since gallbladder out  Lab Results   Component Value Date    HGBA1C 6.5 (H) 09/08/2022   Amaryl 2 mg  Metformin 500 mg  Has had no hypoglycemia  Will monitor a1c  Consider d/c sulf if lowering.  No longer on januvia    Lab Results   Component Value Date    LDLCALC 52.8 (L) 02/22/2022    LDLCALC 52.8 (L) 02/22/2022     Lab Results   Component Value Date    ALT 22 08/30/2022    AST 19 08/30/2022    ALKPHOS 63 08/30/2022    BILITOT 0.3 08/30/2022     Continue statin  Pravastatin 80    HTN  BP Readings from Last 3 Encounters:   12/08/22 128/72   10/04/22 133/72   09/21/22 134/61   Amlodipine 10  Atenolol 50  Lisinopril 30 - discussed angioedema  Controlled  Continue meds    CKD III  Monitor renal function    Hypothyroidism  Lab Results   Component Value Date    TSH 0.884 09/08/2022   On synthroid -   Controlled  Continue 75 mcg      Sciatica  Left side  Goes from buttock to near ankle  No fever  No chills  No sudden weight loss  No saddle anesthesia  No urinary or fecal incontinence.  This pain has been present for 8-9 weeks.  No low back pain  Lumbar xray  PT  Gabapentin 100 mg tid  Avoid nsaid - given CKD - although its quite mild at  gfr 59      If not imprvoing in coming weeks I ask she lets me know and we will consult pain md    6 months    Answers submitted by the patient for this visit:  Back Pain Questionnaire (Submitted on 12/2/2022)  Chief Complaint: Back pain  genital pain: No

## 2022-12-13 ENCOUNTER — CLINICAL SUPPORT (OUTPATIENT)
Dept: REHABILITATION | Facility: HOSPITAL | Age: 77
End: 2022-12-13
Attending: FAMILY MEDICINE
Payer: MEDICARE

## 2022-12-13 DIAGNOSIS — R29.3 POSTURE IMBALANCE: ICD-10-CM

## 2022-12-13 DIAGNOSIS — G57.02 PIRIFORMIS SYNDROME OF LEFT SIDE: ICD-10-CM

## 2022-12-13 DIAGNOSIS — R53.1 WEAKNESS: ICD-10-CM

## 2022-12-13 DIAGNOSIS — M54.32 SCIATICA OF LEFT SIDE: ICD-10-CM

## 2022-12-13 PROCEDURE — 97161 PT EVAL LOW COMPLEX 20 MIN: CPT | Mod: HCNC,PN

## 2022-12-13 PROCEDURE — 97110 THERAPEUTIC EXERCISES: CPT | Mod: HCNC,PN

## 2022-12-13 NOTE — PLAN OF CARE
[facility]  Physical Therapy Initial Evaluation    Name: Patsy Perez  Clinic Number: [id number]    Therapy Diagnosis:   Encounter Diagnoses   Name Primary?    Sciatica of left side     Piriformis syndrome of left side     Weakness     Posture imbalance      Physician: Christ Yepez MD    Physician Orders: PT Eval and Treat   Medical Diagnosis from Referral: Sciatica of left side  Evaluation Date: 12/13/2022  Authorization Period Expiration: 1/13/2023  Plan of Care Expiration: 1/13/2023  Visit # / Visits authorized: 1/ 8  FOTO: 1/10      Time In: 8:30  Time Out: 9:15  Total Billable Time: 15 minutes (LOW Complexity Evaluation, Therapeutic Exercise - 0, Manual Therapy - 15)    Precautions: Standard,     Subjective   Date of onset: 11/2022  History of current condition - Patsy reports: left LE sciatica recently which began 3 weeks following cholecystectomy. Reports no back pain but pain from buttocks down to ankle of left LE.     Medical History:   Past Medical History:   Diagnosis Date    CKD (chronic kidney disease)     DM II (diabetes mellitus, type II), controlled     Fatty liver     Gallstones     found on US    HLD (hyperlipidemia)     Hypertension     Hypothyroidism     s/p thyroidectomy       Surgical History:   Patsy Perez  has a past surgical history that includes Appendectomy; Thyroidectomy; Hysterectomy; and Laparoscopic cholecystectomy (N/A, 9/21/2022).    Medications:   Patsy has a current medication list which includes the following prescription(s): amlodipine, ascorbic acid (vitamin c), aspirin, atenolol, blood sugar diagnostic, famotidine, fluticasone propionate, gabapentin, glimepiride, lancets, levothyroxine, lisinopril, metformin, and pravastatin.    Allergies:   Review of patient's allergies indicates:  No Known Allergies     Imaging, xrays: degenerative disk disease    Prior Therapy: none  Social History:  lives with their spouse  Occupation: na  Prior  Level of Function: Active member of the community and in recreational activites  Current Level of Function: pain limits prolonged walking    Pain:   Current 2/10, worst 8/10, best 0/10   Location: left lower legs  Description: Aching, Burning, and Shooting  Aggravating Factors: Walking  Easing Factors: rest    Pts goals: Return to walking with her spouse painfree    Objective     Posture: flexed trunk posture  Palpation: point tenderness to left piriformis  Sensation: intact  DTRs: 2+  Range of Motion/Strength:     Thoracolumbar AROM Pain/Dysfunction with Movement   Flexion WFL    Extension 50%    Right side bending WFL    Left side bending WFL    Right rotation WFL    Left rotation WFL        Hip Right Left Pain/Dysfunction with Movement   AROM/PROM      flexion WFL WFL    extension 50% pain WFL    abduction WFL pain WFL    adduction WFL WFL    Internal rotation WFL WFL    External rotation WFL WFL      L/E MMT Right Left Pain/Dysfunction with Movement   Hip Flexion 4+/5 5/5    Hip Extension 3-/5 5/5    Hip Abduction 3-/5 5/5    Hip Adduction 5/5 5/5    Hip IR 5/5 5/5    Hip ER 3+/5 5/5      Joint Mobility:   - Lumbar: no deficits  - Other:      Flexibility: minimal decrease with left piriformis and IT band    Special Tests: (-) slump and SLR  CMS Impairment/Limitation/Restriction for FOTO  Survey    Therapist reviewed FOTO scores for Patsy Perez on 12/13/2022.   FOTO documents entered into [employer] - see Media section.    Limitation Score: 31%  Category: Other    Current : CJ = at least 20% but < 40% impaired, limited or restricted  Goal: CJ = at least 20% but < 40% impaired, limited or restricted         TREATMENT   Treatment Time In: 9:00  Treatment Time Out: 9:15  Total Treatment time separate from Evaluation: 15 minutes    Patsy received the following manual therapy techniques: Soft tissue Mobilization were applied to the: left piriformis for 15 minutes, including:  Soft tissue mobilization  TPDN to left  pirifomis      Home Exercises Provided and Patient Education Provided     Education provided:   - posture, HEP    Written Home Exercises Provided: yes.  Exercises were reviewed and Patsy was able to demonstrate them prior to the end of the session.  Patsy demonstrated good  understanding of the education provided.     See EMR under Patient Instructions for exercises provided 12/13/2022.      Assessment   Patsy is a 76 y.o. female referred to outpatient Physical Therapy with a medical diagnosis of left sciatica. Pt presents with flexed trunk posture and point tenderness to left piriformis and intermittent radicular pain into left lower extremity to her ankle. Reports pain increases with prolonged walking. Denies back pain and negative slump tests. Received TPDN to left piriformis which resulted in reduced pain.     Pt prognosis is Excellent.   Pt will benefit from skilled outpatient Physical Therapy to address the deficits stated above and in the chart below, provide pt/family education, and to maximize pt's level of independence.     Plan of care discussed with patient: Yes  Pt's spiritual, cultural and educational needs considered and patient is agreeable to the plan of care and goals as stated below:     Anticipated Barriers for therapy: none    Medical Necessity is demonstrated by the following  History  Co-morbidities and personal factors that may impact the plan of care Co-morbidities:   Recent cholecystectomy    Personal Factors:   no deficits     low   Examination  Body Structures and Functions, activity limitations and participation restrictions that may impact the plan of care Body Regions:   lower extremities    Body Systems:    ROM  strength    Participation Restrictions:   none    Activity limitations:   Learning and applying knowledge  no deficits    General Tasks and Commands  no deficits    Communication  no deficits    Mobility  lifting and carrying objects  walking    Self care  no  deficits    Domestic Life  shopping  cooking  doing house work (cleaning house, washing dishes, laundry)    Interactions/Relationships  no deficits    Life Areas  no deficits    Community and Social Life  community life  recreation and leisure         low   Clinical Presentation stable and uncomplicated low   Decision Making/ Complexity Score: low       Goals:  Short Term Goals: 2 weeks   Patient will be independent with HEP.   Pain will reduce by 10% with prolonged walking without radicular symptoms.     Long Term Goals: 4 weeks   Patient will be painfree with recreational activities.  Patient will exhibit an erect trunk posture while ambulating.   FOTO will improve by 6% to allow for return to recreational activities.     Plan   Plan of care Certification: 12/13/2022 to 1/13/.2023.    Outpatient Physical Therapy 2 times weekly for 4 weeks to include the following interventions: Electrical Stimulation IFC, Manual Therapy, Moist Heat/ Ice, Patient Education, Therapeutic Activities, and Therapeutic Exercise, ASTYM, Kinesiotaping PRN, Functional Dry Needling    Mohinder De Luna, PT, DPT

## 2022-12-16 ENCOUNTER — CLINICAL SUPPORT (OUTPATIENT)
Dept: REHABILITATION | Facility: HOSPITAL | Age: 77
End: 2022-12-16
Payer: MEDICARE

## 2022-12-16 DIAGNOSIS — R29.3 POSTURE IMBALANCE: ICD-10-CM

## 2022-12-16 DIAGNOSIS — R53.1 WEAKNESS: ICD-10-CM

## 2022-12-16 DIAGNOSIS — G57.02 PIRIFORMIS SYNDROME OF LEFT SIDE: Primary | ICD-10-CM

## 2022-12-16 PROCEDURE — 97110 THERAPEUTIC EXERCISES: CPT | Mod: HCNC,PN

## 2022-12-16 PROCEDURE — 97140 MANUAL THERAPY 1/> REGIONS: CPT | Mod: HCNC,PN

## 2022-12-19 ENCOUNTER — CLINICAL SUPPORT (OUTPATIENT)
Dept: REHABILITATION | Facility: HOSPITAL | Age: 77
End: 2022-12-19
Payer: MEDICARE

## 2022-12-19 DIAGNOSIS — R53.1 WEAKNESS: ICD-10-CM

## 2022-12-19 DIAGNOSIS — R29.3 POSTURE IMBALANCE: ICD-10-CM

## 2022-12-19 DIAGNOSIS — G57.02 PIRIFORMIS SYNDROME OF LEFT SIDE: Primary | ICD-10-CM

## 2022-12-19 PROCEDURE — 97140 MANUAL THERAPY 1/> REGIONS: CPT | Mod: HCNC,PN

## 2022-12-19 PROCEDURE — 97110 THERAPEUTIC EXERCISES: CPT | Mod: HCNC,PN

## 2022-12-19 NOTE — PROGRESS NOTES
[facility]   Physical Therapy Treatment Note     Name: Patsy Perez  Clinic Number: [id number]    Therapy Diagnosis:   Encounter Diagnoses   Name Primary?    Piriformis syndrome of left side Yes    Weakness     Posture imbalance      Physician: Christ Yepez MD    Visit Date: 12/16/2022    Physician: Christ Yepez MD     Physician Orders: PT Eval and Treat   Medical Diagnosis from Referral: Sciatica of left side  Evaluation Date: 12/13/2022  Authorization Period Expiration: 1/13/2023  Plan of Care Expiration: 1/13/2023  Visit # / Visits authorized: 2/ 8    PTA Visit #: 0/5     Time In: 9:30  Time Out: 10:15  Total Billable Time: 40 minutes    SUBJECTIVE     Pt reports: significantly better since intial visit.  She was compliant with home exercise program.  Response to previous treatment: reduced pain with walking  Functional change: improved gait mechanics    Pain: 1/10  Location: left buttocks      OBJECTIVE     Objective Measures updated at progress report unless specified.     Treatment     Patsy received the treatments listed below:      therapeutic exercises to develop strength, ROM, flexibility, and posture for 30 minutes including:  Bike  Flexibility of piriformis, it band, hamstrings  Press ups  SLR prone and SL  Clams  SAIDA    manual therapy techniques: Soft tissue Mobilization were applied to the: left piriformois for 10 minutes, including:  Soft tissue mobilization and TPDN to left piriformis  Palpation Assessment to determine the necessity for Functional Dry Needling  .     Patient provided written and verbal consent to Functional Dry Needling     Written Handout on response to FDN provided: yes    Patsy demonstrated good  understanding of the education provided.     Patient demonstrated appropriate response to Functional Dry Needling. .  Good rhythmical contractions observed with estim to treated muscle groups         Patient Education and Home Exercises      Home Exercises Provided and Patient Education Provided     Education provided:   - posture    Written Home Exercises Provided: Patient instructed to cont prior HEP. Exercises were reviewed and Patsy was able to demonstrate them prior to the end of the session.  Patsy demonstrated good  understanding of the education provided. See EMR under Patient Instructions for exercises provided during therapy sessions    ASSESSMENT     Patient has responded well to treatment thus far as evidence by pain reduction and increased activity tolerance. Will benefit fro postural reeducation and strength training to allow for reduced risk of pain returning.     Patsy Is progressing well towards her goals.   Pt prognosis is Excellent.     Pt will continue to benefit from skilled outpatient physical therapy to address the deficits listed in the problem list box on initial evaluation, provide pt/family education and to maximize pt's level of independence in the home and community environment.     Pt's spiritual, cultural and educational needs considered and pt agreeable to plan of care and goals.     Anticipated barriers to physical therapy: none    GoShort Term Goals: 2 weeks   Patient will be independent with HEP.   Pain will reduce by 10% with prolonged walking without radicular symptoms.      Long Term Goals: 4 weeks   Patient will be painfree with recreational activities.  Patient will exhibit an erect trunk posture while ambulating.   FOTO will improve by 6% to allow for return to recreational activities.als:     PLAN     Plan of care Certification: 12/13/2022 to 1/13/.2023.     Outpatient Physical Therapy 2 times weekly for 4 weeks to include the following interventions: Electrical Stimulation IFC, Manual Therapy, Moist Heat/ Ice, Patient Education, Therapeutic Activities, and Therapeutic Exercise, ASTYM, Kinesiotaping PRN, Functional Dry Needling    Mohinder De Luna, PT

## 2022-12-19 NOTE — PROGRESS NOTES
[facility]   Physical Therapy Treatment Note     Name: Patsy Perez  Clinic Number: [id number]    Therapy Diagnosis:   Encounter Diagnoses   Name Primary?    Piriformis syndrome of left side Yes    Weakness     Posture imbalance      Physician: Christ Yepez MD    Visit Date: 12/19/2022    Physician: Christ Yepez MD     Physician Orders: PT Eval and Treat   Medical Diagnosis from Referral: Sciatica of left side  Evaluation Date: 12/13/2022  Authorization Period Expiration: 1/13/2023  Plan of Care Expiration: 1/13/2023  Visit # / Visits authorized: 3/ 8    PTA Visit #: 0/5     Time In: 8:00  Time Out: 8:45  Total Billable Time: 40 minutes    SUBJECTIVE     Pt reports: significantly better since intial visit. Soreness returned with cold weather  She was compliant with home exercise program.  Response to previous treatment: reduced pain with walking  Functional change: improved gait mechanics    Pain: 1/10  Location: left buttocks      OBJECTIVE     Objective Measures updated at progress report unless specified.     Treatment     Patsy received the treatments listed below:      therapeutic exercises to develop strength, ROM, flexibility, and posture for 30 minutes including:  Bike  Flexibility of piriformis, it band, hamstrings  Press ups  SLR prone and SL  Clams  Dead lift 35# 6x2  Carries 15# x4  Medex 40# 20 reps    manual therapy techniques: Soft tissue Mobilization were applied to the: left piriformois for 10 minutes, including:  Soft tissue mobilization and TPDN to left piriformis  Palpation Assessment to determine the necessity for Functional Dry Needling  .     Patient provided written and verbal consent to Functional Dry Needling     Written Handout on response to FDN provided: yes    Patsy demonstrated good  understanding of the education provided.     Patient demonstrated appropriate response to Functional Dry Needling. .  Good rhythmical contractions observed with  estim to treated muscle groups         Patient Education and Home Exercises     Home Exercises Provided and Patient Education Provided     Education provided:   - posture    Written Home Exercises Provided: Patient instructed to cont prior HEP. Exercises were reviewed and Patsy was able to demonstrate them prior to the end of the session.  Patsy demonstrated good  understanding of the education provided. See EMR under Patient Instructions for exercises provided during therapy sessions    ASSESSMENT     Patient has responded well to treatment with noted increase in flexibility and trunk/hip extension ROM painfree. TPDN received to left piriformis with improved results.    Patsy Is progressing well towards her goals.   Pt prognosis is Excellent.     Pt will continue to benefit from skilled outpatient physical therapy to address the deficits listed in the problem list box on initial evaluation, provide pt/family education and to maximize pt's level of independence in the home and community environment.     Pt's spiritual, cultural and educational needs considered and pt agreeable to plan of care and goals.     Anticipated barriers to physical therapy: none    GoShort Term Goals: 2 weeks   Patient will be independent with HEP.   Pain will reduce by 10% with prolonged walking without radicular symptoms.      Long Term Goals: 4 weeks   Patient will be painfree with recreational activities.  Patient will exhibit an erect trunk posture while ambulating.   FOTO will improve by 6% to allow for return to recreational activities.als:     PLAN     Plan of care Certification: 12/13/2022 to 1/13/.2023.     Outpatient Physical Therapy 2 times weekly for 4 weeks to include the following interventions: Electrical Stimulation IFC, Manual Therapy, Moist Heat/ Ice, Patient Education, Therapeutic Activities, and Therapeutic Exercise, ASTYM, Kinesiotaping PRN, Functional Dry Needling    Mohinder De Luna, PT

## 2022-12-21 ENCOUNTER — CLINICAL SUPPORT (OUTPATIENT)
Dept: REHABILITATION | Facility: HOSPITAL | Age: 77
End: 2022-12-21
Attending: FAMILY MEDICINE
Payer: MEDICARE

## 2022-12-21 DIAGNOSIS — R53.1 WEAKNESS: ICD-10-CM

## 2022-12-21 DIAGNOSIS — G57.02 PIRIFORMIS SYNDROME OF LEFT SIDE: Primary | ICD-10-CM

## 2022-12-21 DIAGNOSIS — R29.3 POSTURE IMBALANCE: ICD-10-CM

## 2022-12-21 PROCEDURE — 97140 MANUAL THERAPY 1/> REGIONS: CPT | Mod: HCNC,PN

## 2022-12-21 PROCEDURE — 97110 THERAPEUTIC EXERCISES: CPT | Mod: HCNC,PN

## 2022-12-21 NOTE — PROGRESS NOTES
[facility]   Physical Therapy Treatment Note     Name: Patsy Perez  Clinic Number: [id number]    Therapy Diagnosis:   Encounter Diagnoses   Name Primary?    Piriformis syndrome of left side Yes    Weakness     Posture imbalance      Physician: Christ Yepez MD    Visit Date: 12/21/2022    Physician: Christ Yepez MD     Physician Orders: PT Eval and Treat   Medical Diagnosis from Referral: Sciatica of left side  Evaluation Date: 12/13/2022  Authorization Period Expiration: 1/13/2023  Plan of Care Expiration: 1/13/2023  Visit # / Visits authorized: 4/ 8    PTA Visit #: 0/5     Time In: 9:30  Time Out: 10:15  Total Billable Time: 40 minutes    SUBJECTIVE     Pt reports: soreness after previous session but reports pain more laterally  She was compliant with home exercise program.  Response to previous treatment: reduced pain with walking  Functional change: improved gait mechanics    Pain: 1/10  Location: left buttocks      OBJECTIVE     Objective Measures updated at progress report unless specified.     Treatment     Patsy received the treatments listed below:      therapeutic exercises to develop strength, ROM, flexibility, and posture for 15 minutes including:  Bike  Flexibility of piriformis, it band, hamstrings  Press ups-HEP  SLR prone and SL  Clams-HEP  Dead lift 35# 6x2 HOLD  Carries 15# x4 HOLD  Medex 40# 20 reps HOLD    manual therapy techniques: Soft tissue Mobilization were applied to the: left piriformois for 25 minutes, including:  Soft tissue mobilization and TPDN to left piriformis, glut medius  Palpation Assessment to determine the necessity for Functional Dry Needling  .     Patient provided written and verbal consent to Functional Dry Needling     Written Handout on response to FDN provided: yes    Patsy demonstrated good  understanding of the education provided.     Patient demonstrated appropriate response to Functional Dry Needling. .  Good rhythmical  contractions observed with estim to treated muscle groups         Patient Education and Home Exercises     Home Exercises Provided and Patient Education Provided     Education provided:   - posture    Written Home Exercises Provided: Patient instructed to cont prior HEP. Exercises were reviewed and Patsy was able to demonstrate them prior to the end of the session.  Patsy demonstrated good  understanding of the education provided. See EMR under Patient Instructions for exercises provided during therapy sessions    ASSESSMENT     Patient reported reduced pain after soft tissue mobilization and TPDN to glut medius and TFL. Pain was point tender more laterally than previous pain.     Patsy Is progressing well towards her goals.   Pt prognosis is Excellent.     Pt will continue to benefit from skilled outpatient physical therapy to address the deficits listed in the problem list box on initial evaluation, provide pt/family education and to maximize pt's level of independence in the home and community environment.     Pt's spiritual, cultural and educational needs considered and pt agreeable to plan of care and goals.     Anticipated barriers to physical therapy: none    GoShort Term Goals: 2 weeks   Patient will be independent with HEP.   Pain will reduce by 10% with prolonged walking without radicular symptoms.      Long Term Goals: 4 weeks   Patient will be painfree with recreational activities.  Patient will exhibit an erect trunk posture while ambulating.   FOTO will improve by 6% to allow for return to recreational activities.als:     PLAN     Plan of care Certification: 12/13/2022 to 1/13/.2023.     Outpatient Physical Therapy 2 times weekly for 4 weeks to include the following interventions: Electrical Stimulation IFC, Manual Therapy, Moist Heat/ Ice, Patient Education, Therapeutic Activities, and Therapeutic Exercise, ASTYM, Kinesiotaping PRN, Functional Dry Needling    Mohinder De Luna, PT

## 2023-01-03 ENCOUNTER — CLINICAL SUPPORT (OUTPATIENT)
Dept: REHABILITATION | Facility: HOSPITAL | Age: 78
End: 2023-01-03
Payer: MEDICARE

## 2023-01-03 DIAGNOSIS — G57.02 PIRIFORMIS SYNDROME OF LEFT SIDE: Primary | ICD-10-CM

## 2023-01-03 DIAGNOSIS — R53.1 WEAKNESS: ICD-10-CM

## 2023-01-03 DIAGNOSIS — R29.3 POSTURE IMBALANCE: ICD-10-CM

## 2023-01-03 PROCEDURE — 97110 THERAPEUTIC EXERCISES: CPT | Mod: HCNC,PN

## 2023-01-03 PROCEDURE — 97140 MANUAL THERAPY 1/> REGIONS: CPT | Mod: HCNC,PN

## 2023-01-03 NOTE — PROGRESS NOTES
[facility]   Physical Therapy Treatment Note     Name: Patsy Perez  Clinic Number: [id number]    Therapy Diagnosis:   Encounter Diagnoses   Name Primary?    Piriformis syndrome of left side Yes    Weakness     Posture imbalance      Physician: Christ Yepez MD    Visit Date: 1/3/2023    Physician: Crhist Yepez MD     Physician Orders: PT Eval and Treat   Medical Diagnosis from Referral: Sciatica of left side  Evaluation Date: 12/13/2022  Authorization Period Expiration: 1/13/2023  Plan of Care Expiration: 1/13/2023  Visit # / Visits authorized: 5/ 8    PTA Visit #: 0/5     Time In: 8:45  Time Out: 9:30  Total Billable Time: 40 minutes    SUBJECTIVE     Pt reports: reduced pain following previous session with improved ability to exercise  She was compliant with home exercise program.  Response to previous treatment: reduced pain with walking  Functional change: improved gait mechanics    Pain: 1/10  Location: left buttocks      OBJECTIVE     Objective Measures updated at progress report unless specified.     Treatment     Patsy received the treatments listed below:      therapeutic exercises to develop strength, ROM, flexibility, and posture for 15 minutes including:  Bike  Flexibility of piriformis, it band, hamstrings  Press ups-HEP  SLR prone and SL  Clams-  Dead lift 35# 6x2 HOLD  Carries 15# x4 HOLD  Medex 40# 20 reps HOLD    manual therapy techniques: Soft tissue Mobilization were applied to the: left piriformois for 25 minutes, including:  Soft tissue mobilization and TPDN to left piriformis, glut medius  Palpation Assessment to determine the necessity for Functional Dry Needling  .     Patient provided written and verbal consent to Functional Dry Needling     Written Handout on response to FDN provided: yes    Patsy demonstrated good  understanding of the education provided.     Patient demonstrated appropriate response to Functional Dry Needling. .  Good rhythmical  contractions observed with estim to treated muscle groups         Patient Education and Home Exercises     Home Exercises Provided and Patient Education Provided     Education provided:   - posture    Written Home Exercises Provided: Patient instructed to cont prior HEP. Exercises were reviewed and Patsy was able to demonstrate them prior to the end of the session.  Patsy demonstrated good  understanding of the education provided. See EMR under Patient Instructions for exercises provided during therapy sessions    ASSESSMENT     Patient reported reduced pain after soft tissue mobilization and TPDN to glut medius and TFL. Reports ability to participate in more activity and is no longer pain limited.     Patsy Is progressing well towards her goals.   Pt prognosis is Excellent.     Pt will continue to benefit from skilled outpatient physical therapy to address the deficits listed in the problem list box on initial evaluation, provide pt/family education and to maximize pt's level of independence in the home and community environment.     Pt's spiritual, cultural and educational needs considered and pt agreeable to plan of care and goals.     Anticipated barriers to physical therapy: none    GoShort Term Goals: 2 weeks   Patient will be independent with HEP.   Pain will reduce by 10% with prolonged walking without radicular symptoms.      Long Term Goals: 4 weeks   Patient will be painfree with recreational activities.  Patient will exhibit an erect trunk posture while ambulating.   FOTO will improve by 6% to allow for return to recreational activities.als:     PLAN     Plan of care Certification: 12/13/2022 to 1/13/.2023.     Outpatient Physical Therapy 2 times weekly for 4 weeks to include the following interventions: Electrical Stimulation IFC, Manual Therapy, Moist Heat/ Ice, Patient Education, Therapeutic Activities, and Therapeutic Exercise, ASTYM, Kinesiotaping PRN, Functional Dry Needling    Mohinder MONGE  Annamarie, PT

## 2023-01-05 ENCOUNTER — CLINICAL SUPPORT (OUTPATIENT)
Dept: REHABILITATION | Facility: HOSPITAL | Age: 78
End: 2023-01-05
Payer: MEDICARE

## 2023-01-05 DIAGNOSIS — G57.02 PIRIFORMIS SYNDROME OF LEFT SIDE: Primary | ICD-10-CM

## 2023-01-05 DIAGNOSIS — R53.1 WEAKNESS: ICD-10-CM

## 2023-01-05 DIAGNOSIS — R29.3 POSTURE IMBALANCE: ICD-10-CM

## 2023-01-05 PROCEDURE — 97140 MANUAL THERAPY 1/> REGIONS: CPT | Mod: HCNC,PN

## 2023-01-05 PROCEDURE — 97110 THERAPEUTIC EXERCISES: CPT | Mod: HCNC,PN

## 2023-01-05 NOTE — PROGRESS NOTES
[facility]   Physical Therapy Treatment Note     Name: Patsy Perez  Clinic Number: [id number]    Therapy Diagnosis:   Encounter Diagnoses   Name Primary?    Piriformis syndrome of left side Yes    Weakness     Posture imbalance      Physician: Christ Yepez MD    Visit Date: 1/5/2023    Physician: Christ Yepez MD     Physician Orders: PT Eval and Treat   Medical Diagnosis from Referral: Sciatica of left side  Evaluation Date: 12/13/2022  Authorization Period Expiration: 1/13/2023  Plan of Care Expiration: 1/13/2023  Visit # / Visits authorized: 6/ 8    PTA Visit #: 0/5     Time In: 8:45  Time Out: 9:30  Total Billable Time: 40 minutes    SUBJECTIVE     Pt reports: reduced pain allowing more activity tolerance however pain continues prolonged walking  She was compliant with home exercise program.  Response to previous treatment: reduced pain with walking  Functional change: improved gait mechanics but continues to have minimal trunk flexion in standing    Pain: 1/10  Location: left buttocks      OBJECTIVE     Objective Measures updated at progress report unless specified.     Treatment     Patsy received the treatments listed below:      therapeutic exercises to develop strength, ROM, flexibility, and posture for 30 minutes including:  Bike  Flexibility of piriformis, it band, hamstrings  Standing neutral pelvis with hip hiking      manual therapy techniques: Soft tissue Mobilization were applied to the: left piriformois for 15 minutes, including:  Soft tissue mobilization and TPDN to left piriformis, glut medius  Palpation Assessment to determine the necessity for Functional Dry Needling  .     Patient provided written and verbal consent to Functional Dry Needling     Written Handout on response to FDN provided: yes    Patsy demonstrated good  understanding of the education provided.     Patient demonstrated appropriate response to Functional Dry Needling. .  Good  rhythmical contractions observed with estim to treated muscle groups         Patient Education and Home Exercises     Home Exercises Provided and Patient Education Provided     Education provided:   - posture    Written Home Exercises Provided: Patient instructed to cont prior HEP. Exercises were reviewed and Patsy was able to demonstrate them prior to the end of the session.  Patsy demonstrated good  understanding of the education provided. See EMR under Patient Instructions for exercises provided during therapy sessions    ASSESSMENT     Patient reported reduced pain. Continues to have a forward trunk flexion and lateral weight shift to the right due to weakness of pelvic stabilizers.   Patsy Is progressing well towards her goals.   Pt prognosis is Excellent.     Pt will continue to benefit from skilled outpatient physical therapy to address the deficits listed in the problem list box on initial evaluation, provide pt/family education and to maximize pt's level of independence in the home and community environment.     Pt's spiritual, cultural and educational needs considered and pt agreeable to plan of care and goals.     Anticipated barriers to physical therapy: none    GoShort Term Goals: 2 weeks   Patient will be independent with HEP.   Pain will reduce by 10% with prolonged walking without radicular symptoms.      Long Term Goals: 4 weeks   Patient will be painfree with recreational activities.  Patient will exhibit an erect trunk posture while ambulating.   FOTO will improve by 6% to allow for return to recreational activities.als:     PLAN     Plan of care Certification: 12/13/2022 to 1/13/.2023.     Outpatient Physical Therapy 2 times weekly for 4 weeks to include the following interventions: Electrical Stimulation IFC, Manual Therapy, Moist Heat/ Ice, Patient Education, Therapeutic Activities, and Therapeutic Exercise, ASTYM, Kinesiotaping PRN, Functional Dry Needling    Mohinder De Luna, PT

## 2023-01-09 ENCOUNTER — CLINICAL SUPPORT (OUTPATIENT)
Dept: REHABILITATION | Facility: HOSPITAL | Age: 78
End: 2023-01-09
Payer: MEDICARE

## 2023-01-09 DIAGNOSIS — R53.1 WEAKNESS: ICD-10-CM

## 2023-01-09 DIAGNOSIS — R29.3 POSTURE IMBALANCE: ICD-10-CM

## 2023-01-09 DIAGNOSIS — G57.02 PIRIFORMIS SYNDROME OF LEFT SIDE: Primary | ICD-10-CM

## 2023-01-09 PROCEDURE — 97110 THERAPEUTIC EXERCISES: CPT | Mod: HCNC,PN

## 2023-01-09 PROCEDURE — 97140 MANUAL THERAPY 1/> REGIONS: CPT | Mod: HCNC,PN

## 2023-01-09 NOTE — PROGRESS NOTES
[facility]   Physical Therapy Treatment Note     Name: Patsy Perez  Clinic Number: [id number]    Therapy Diagnosis:   Encounter Diagnoses   Name Primary?    Piriformis syndrome of left side Yes    Weakness     Posture imbalance      Physician: Christ Yepez MD    Visit Date: 1/9/2023    Physician: Christ Yepez MD     Physician Orders: PT Eval and Treat   Medical Diagnosis from Referral: Sciatica of left side  Evaluation Date: 12/13/2022  Authorization Period Expiration: 1/13/2023  Plan of Care Expiration: 1/13/2023  Visit # / Visits authorized: 7/ 8    PTA Visit #: 0/5     Time In: 9:30  Time Out: 10:15  Total Billable Time: 40 minutes    SUBJECTIVE     Pt reports: pain over the weekend after mopping and vaccuuming  She was compliant with home exercise program.  Response to previous treatment: reduced pain with walking  Functional change: improved gait mechanics but continues to have minimal trunk flexion in standing    Pain: 1/10  Location: left buttocks      OBJECTIVE     Objective Measures updated at progress report unless specified.     Treatment     Patsy received the treatments listed below:      therapeutic exercises to develop strength, ROM, flexibility, and posture for 30 minutes including:  Bike  Flexibility of piriformis, it band, hamstrings  Standing neutral pelvis with hip hiking      manual therapy techniques: Soft tissue Mobilization were applied to the: left piriformois for 15 minutes, including:  Soft tissue mobilization and TPDN to left piriformis, glut medius  Palpation Assessment to determine the necessity for Functional Dry Needling  .     Patient provided written and verbal consent to Functional Dry Needling     Written Handout on response to FDN provided: yes    Patsy demonstrated good  understanding of the education provided.     Patient demonstrated appropriate response to Functional Dry Needling. .  Good rhythmical contractions observed with estim  to treated muscle groups         Patient Education and Home Exercises     Home Exercises Provided and Patient Education Provided     Education provided:   - posture    Written Home Exercises Provided: Patient instructed to cont prior HEP. Exercises were reviewed and Patsy was able to demonstrate them prior to the end of the session.  Patsy demonstrated good  understanding of the education provided. See EMR under Patient Instructions for exercises provided during therapy sessions    ASSESSMENT     Patient continues to have pain elicited with hip extension while maintaining stability on left. Point tenderness noted to left piriformis. TPDN performed to this area this date.  Patsy Is progressing well towards her goals.   Pt prognosis is Excellent.     Pt will continue to benefit from skilled outpatient physical therapy to address the deficits listed in the problem list box on initial evaluation, provide pt/family education and to maximize pt's level of independence in the home and community environment.     Pt's spiritual, cultural and educational needs considered and pt agreeable to plan of care and goals.     Anticipated barriers to physical therapy: none    GoShort Term Goals: 2 weeks   Patient will be independent with HEP.   Pain will reduce by 10% with prolonged walking without radicular symptoms.      Long Term Goals: 4 weeks   Patient will be painfree with recreational activities.  Patient will exhibit an erect trunk posture while ambulating.   FOTO will improve by 6% to allow for return to recreational activities.als:     PLAN     Plan of care Certification: 12/13/2022 to 1/13/.2023.     Outpatient Physical Therapy 2 times weekly for 4 weeks to include the following interventions: Electrical Stimulation IFC, Manual Therapy, Moist Heat/ Ice, Patient Education, Therapeutic Activities, and Therapeutic Exercise, ASTYM, Kinesiotaping PRN, Functional Dry Needling    Mohinder De Luna, PT

## 2023-01-11 ENCOUNTER — CLINICAL SUPPORT (OUTPATIENT)
Dept: REHABILITATION | Facility: HOSPITAL | Age: 78
End: 2023-01-11
Payer: MEDICARE

## 2023-01-11 DIAGNOSIS — G57.02 PIRIFORMIS SYNDROME OF LEFT SIDE: Primary | ICD-10-CM

## 2023-01-11 DIAGNOSIS — R29.3 POSTURE IMBALANCE: ICD-10-CM

## 2023-01-11 DIAGNOSIS — R53.1 WEAKNESS: ICD-10-CM

## 2023-01-11 PROCEDURE — 97140 MANUAL THERAPY 1/> REGIONS: CPT | Mod: HCNC,PN

## 2023-01-11 PROCEDURE — 97110 THERAPEUTIC EXERCISES: CPT | Mod: HCNC,PN

## 2023-01-11 NOTE — PROGRESS NOTES
[facility]   Physical Therapy Treatment Note     Name: Patsy Perez  Clinic Number: [id number]    Therapy Diagnosis:   Encounter Diagnoses   Name Primary?    Piriformis syndrome of left side Yes    Weakness     Posture imbalance      Physician: Christ Yepez MD    Visit Date: 1/11/2023    Physician: Christ Yepez MD     Physician Orders: PT Eval and Treat   Medical Diagnosis from Referral: Sciatica of left side  Evaluation Date: 12/13/2022  Authorization Period Expiration: 1/13/2023  Plan of Care Expiration: 1/13/2023  Visit # / Visits authorized: 7/ 8    PTA Visit #: 0/5     Time In: 9:30  Time Out: 10:15  Total Billable Time: 40 minutes    SUBJECTIVE     Pt reports: significant pain reduction  She was compliant with home exercise program.  Response to previous treatment: reduced pain with walking  Functional change: improved standing with stable left LE    Pain: 1/10  Location: left buttocks      OBJECTIVE     Objective Measures updated at progress report unless specified.     Treatment     Patsy received the treatments listed below:      therapeutic exercises to develop strength, ROM, flexibility, and posture for 30 minutes including:  Bike  Flexibility of piriformis, it band, hamstrings  Standing neutral pelvis with hip hiking      manual therapy techniques: Soft tissue Mobilization were applied to the: left piriformois for 15 minutes, including:  Soft tissue mobilization and TPDN to left piriformis, glut medius  Palpation Assessment to determine the necessity for Functional Dry Needling  .     Patient provided written and verbal consent to Functional Dry Needling     Written Handout on response to FDN provided: yes    Patsy demonstrated good  understanding of the education provided.     Patient demonstrated appropriate response to Functional Dry Needling. .  Good rhythmical contractions observed with estim to treated muscle groups         Patient Education and Home  Exercises     Home Exercises Provided and Patient Education Provided     Education provided:   - posture    Written Home Exercises Provided: Patient instructed to cont prior HEP. Exercises were reviewed and Patsy was able to demonstrate them prior to the end of the session.  Patsy demonstrated good  understanding of the education provided. See EMR under Patient Instructions for exercises provided during therapy sessions    ASSESSMENT     Patient reports significant pain reduction after previous session. Has diffiuclty maintaining stability with single limb stance on left due to hip extension and weakness. Patient participated in neuromuscular reeducation to enhance strength and stability followed by TPDN to left piriformis.   Patsy Is progressing well towards her goals.   Pt prognosis is Excellent.     Pt will continue to benefit from skilled outpatient physical therapy to address the deficits listed in the problem list box on initial evaluation, provide pt/family education and to maximize pt's level of independence in the home and community environment.     Pt's spiritual, cultural and educational needs considered and pt agreeable to plan of care and goals.     Anticipated barriers to physical therapy: none    GoShort Term Goals: 2 weeks   Patient will be independent with HEP.   Pain will reduce by 10% with prolonged walking without radicular symptoms.      Long Term Goals: 4 weeks   Patient will be painfree with recreational activities.  Patient will exhibit an erect trunk posture while ambulating.   FOTO will improve by 6% to allow for return to recreational activities.als:     PLAN     Plan of care Certification: 12/13/2022 to 1/13/.2023.     Outpatient Physical Therapy 2 times weekly for 4 weeks to include the following interventions: Electrical Stimulation IFC, Manual Therapy, Moist Heat/ Ice, Patient Education, Therapeutic Activities, and Therapeutic Exercise, ASTYM, Kinesiotaping PRN, Functional Dry  Kathy De Luna, PT

## 2023-01-18 ENCOUNTER — CLINICAL SUPPORT (OUTPATIENT)
Dept: REHABILITATION | Facility: HOSPITAL | Age: 78
End: 2023-01-18
Payer: MEDICARE

## 2023-01-18 DIAGNOSIS — R29.3 POSTURE IMBALANCE: ICD-10-CM

## 2023-01-18 DIAGNOSIS — R53.1 WEAKNESS: ICD-10-CM

## 2023-01-18 DIAGNOSIS — G57.02 PIRIFORMIS SYNDROME OF LEFT SIDE: Primary | ICD-10-CM

## 2023-01-18 PROCEDURE — 97110 THERAPEUTIC EXERCISES: CPT | Mod: HCNC,PN

## 2023-01-19 ENCOUNTER — CLINICAL SUPPORT (OUTPATIENT)
Dept: REHABILITATION | Facility: HOSPITAL | Age: 78
End: 2023-01-19
Payer: MEDICARE

## 2023-01-19 DIAGNOSIS — R29.3 POSTURE IMBALANCE: ICD-10-CM

## 2023-01-19 DIAGNOSIS — G57.02 PIRIFORMIS SYNDROME OF LEFT SIDE: Primary | ICD-10-CM

## 2023-01-19 DIAGNOSIS — R53.1 WEAKNESS: ICD-10-CM

## 2023-01-19 PROCEDURE — 97110 THERAPEUTIC EXERCISES: CPT | Mod: HCNC,PN

## 2023-01-19 PROCEDURE — 97112 NEUROMUSCULAR REEDUCATION: CPT | Mod: HCNC,PN

## 2023-01-19 NOTE — PROGRESS NOTES
[facility]   Physical Therapy Treatment Note     Name: Patsy Perez  Clinic Number: [id number]    Therapy Diagnosis:   Encounter Diagnoses   Name Primary?    Piriformis syndrome of left side Yes    Weakness     Posture imbalance      Physician: Christ Yepez MD    Visit Date: 1/18/2023    Physician: Christ Yepez MD     Physician Orders: PT Eval and Treat   Medical Diagnosis from Referral: Sciatica of left side  Evaluation Date: 12/13/2022  Authorization Period Expiration: 1/13/2023  Plan of Care Expiration: 1/13/2023  Visit # / Visits authorized: 7/ 8    PTA Visit #: 0/5     Time In: 9:15  Time Out: 10:15  Total Billable Time: 54 minutes    SUBJECTIVE     Pt reports: reduced hip pain but still lingers  She was compliant with home exercise program.  Response to previous treatment: reduced pain with walking  Functional change: improved ability to maintain standing on left     Pain: 1/10  Location: left buttocks      OBJECTIVE     Objective Measures updated at progress report unless specified.     Treatment     Patsy received the treatments listed below:      therapeutic exercises to develop strength, ROM, flexibility, and posture for 54 minutes including:  Bike  Flexibility of piriformis, it band, hamstrings  Standing neutral pelvis with hip hiking  Standing with neutral pelvis and opposite abduction  Med ex ext 40# 0-65 20 reps  KB squats with cues for terminal extension  Step ups with right to maximize stability on right  SAIDA with red bands 10x2 each direction  Bird dog ball roll  SL clams with red bands 20 reps  Press ups      manual therapy techniques: Soft tissue Mobilization were applied to the: left piriformois for 0 minutes, including:  Soft tissue mobilization and TPDN to left piriformis, glut medius  Palpation Assessment to determine the necessity for Functional Dry Needling  .     Patient provided written and verbal consent to Functional Dry Needling     Written  Handout on response to FDN provided: yes    Patsy demonstrated good  understanding of the education provided.     Patient demonstrated appropriate response to Functional Dry Needling. .  Good rhythmical contractions observed with estim to treated muscle groups         Patient Education and Home Exercises     Home Exercises Provided and Patient Education Provided     Education provided:   - posture    Written Home Exercises Provided: Patient instructed to cont prior HEP. Exercises were reviewed and Patsy was able to demonstrate them prior to the end of the session.  Patsy demonstrated good  understanding of the education provided. See EMR under Patient Instructions for exercises provided during therapy sessions    ASSESSMENT     Patient reports significant pain reduction after previous session but continues to experience intermittent pain with prolonged walking and standing on left leg. Displays difficulty maintaining hip neutrality in single limb stance compared to right which agggrevates symptoms. Patsy Is progressing well towards her goals.   Pt prognosis is Excellent.     Pt will continue to benefit from skilled outpatient physical therapy to address the deficits listed in the problem list box on initial evaluation, provide pt/family education and to maximize pt's level of independence in the home and community environment.     Pt's spiritual, cultural and educational needs considered and pt agreeable to plan of care and goals.     Anticipated barriers to physical therapy: none    GoShort Term Goals: 2 weeks   Patient will be independent with HEP.   Pain will reduce by 10% with prolonged walking without radicular symptoms.      Long Term Goals: 4 weeks   Patient will be painfree with recreational activities.  Patient will exhibit an erect trunk posture while ambulating.   FOTO will improve by 6% to allow for return to recreational activities.als:     PLAN     Plan of care Certification: 12/13/2022 to  1/13/.2023.     Outpatient Physical Therapy 2 times weekly for 4 weeks to include the following interventions: Electrical Stimulation IFC, Manual Therapy, Moist Heat/ Ice, Patient Education, Therapeutic Activities, and Therapeutic Exercise, ASTYM, Kinesiotaping PRN, Functional Dry Needling    Mohinder De Luna, PT

## 2023-01-19 NOTE — PLAN OF CARE
[facility]  Physical Therapy Plan of Care Note    Name: Patsy Perez  Clinic Number: [id number]    Therapy Diagnosis:   Encounter Diagnoses   Name Primary?    Piriformis syndrome of left side Yes    Weakness     Posture imbalance      Physician: Christ Yepez MD    Visit Date: 1/19/2023    Physician Orders: PT Eval and Treat   Medical Diagnosis from Referral: Sciatica of left side  Evaluation Date: 12/13/2022  Authorization Period Expiration: 1/13/2023  Plan of Care Expiration: 1/13/2023  Visit # / Visits authorized: 8/ 8  FOTO: 2/3    Precautions: Standard  Functional Level Prior to Evaluation:  pain limited ambulation and unable to participate in recreational activities    SUBJECTIVE     Update: Patient reports significant improvement in pain however continues to have intermittent left hip and LE pain which is improving allowing for enhanced activity tolerance    OBJECTIVE     Update: Standing posture and gait are more erect and stable through left hip due to increased left hip ROM to WFL all planes as well as 4+/5 strength hip extension and external rotation.     ASSESSMENT     Update: Patsy is progressing well however continues to require further strength and proprioception training to allow her to maintain hip stability with gait and standing activities.     Previous Short Term Goals Status:   met  New Short Term Goals Status:   none  Long Term Goal Status: continue per initial plan of care.  Reasons for Recertification of Therapy:   progress strength and proprioception to allow for return to recreational activities    GOALS  ALL MET  Short Term Goals: 2 weeks   Patient will be independent with HEP.   Pain will reduce by 10% with prolonged walking without radicular symptoms.      Long Term Goals: 4 weeks   Patient will be painfree with recreational activities.  Patient will exhibit an erect trunk posture while ambulating.   FOTO will improve by 6% to allow for return to  recreational activities. MET    PLAN     Updated Certification Period: 1/13/2023 to 2/13/2023   Recommended Treatment Plan: 2 times per week for 4 weeks:  Electrical Stimulation IFC, Manual Therapy, Neuromuscular Re-ed, Therapeutic Activities, and Therapeutic Exercise  Other Recommendations: BEATA De Luna, PT    I CERTIFY THE NEED FOR THESE SERVICES FURNISHED UNDER THIS PLAN OF TREATMENT AND WHILE UNDER MY CARE  Physician's comments:      Physician's Signature: ___________________________________________________

## 2023-01-19 NOTE — PROGRESS NOTES
[facility]   Physical Therapy Treatment Note     Name: Patsy Perez  Clinic Number: [id number]    Therapy Diagnosis:   Encounter Diagnoses   Name Primary?    Piriformis syndrome of left side Yes    Weakness     Posture imbalance      Physician: Christ Yepez MD    Visit Date: 1/19/2023    Physician: Christ Yepez MD     Physician Orders: PT Eval and Treat   Medical Diagnosis from Referral: Sciatica of left side  Evaluation Date: 12/13/2022  Authorization Period Expiration: 1/13/2023  Plan of Care Expiration: 1/13/2023  Visit # / Visits authorized: 7/ 8    PTA Visit #: 0/5     Time In: 9:15  Time Out: 10:15  Total Billable Time: 54 minutes    SUBJECTIVE     Pt reports: reduced hip pain but still lingers  She was compliant with home exercise program.  Response to previous treatment: reduced pain with walking  Functional change: improved ability to maintain standing on left     Pain: 1/10  Location: left buttocks      OBJECTIVE     Objective Measures updated at progress report unless specified.     Treatment     Patsy received the treatments listed below:      therapeutic exercises to develop strength, ROM, flexibility, and posture for 54 minutes including:  Bike  Flexibility of piriformis, it band, hamstrings  Standing neutral pelvis with hip hiking  Standing with neutral pelvis and opposite abduction  Med ex ext 40# 0-65 20 reps  KB squats with cues for terminal extension  Step ups with right to maximize stability on right  SAIDA with red bands 10x2 each direction  Bird dog ball roll  SL clams with red bands 20 reps  Press ups      manual therapy techniques: Soft tissue Mobilization were applied to the: left piriformois for 0 minutes, including:  Soft tissue mobilization and TPDN to left piriformis, glut medius  Palpation Assessment to determine the necessity for Functional Dry Needling  .     Patient provided written and verbal consent to Functional Dry Needling     Written  Handout on response to FDN provided: yes    Patsy demonstrated good  understanding of the education provided.     Patient demonstrated appropriate response to Functional Dry Needling. .  Good rhythmical contractions observed with estim to treated muscle groups         Patient Education and Home Exercises     Home Exercises Provided and Patient Education Provided     Education provided:   - posture    Written Home Exercises Provided: Patient instructed to cont prior HEP. Exercises were reviewed and Patsy was able to demonstrate them prior to the end of the session.  Patsy demonstrated good  understanding of the education provided. See EMR under Patient Instructions for exercises provided during therapy sessions    ASSESSMENT     Patient reports significant pain reduction after previous session but continues to experience intermittent pain with prolonged walking and standing on left leg. Displays difficulty maintaining hip neutrality in single limb stance compared to right which agggrevates symptoms. Patsy Is progressing well towards her goals.   Pt prognosis is Excellent.     Pt will continue to benefit from skilled outpatient physical therapy to address the deficits listed in the problem list box on initial evaluation, provide pt/family education and to maximize pt's level of independence in the home and community environment.     Pt's spiritual, cultural and educational needs considered and pt agreeable to plan of care and goals.     Anticipated barriers to physical therapy: none    GoShort Term Goals: 2 weeks   Patient will be independent with HEP.   Pain will reduce by 10% with prolonged walking without radicular symptoms.      Long Term Goals: 4 weeks   Patient will be painfree with recreational activities.  Patient will exhibit an erect trunk posture while ambulating.   FOTO will improve by 6% to allow for return to recreational activities.als:     PLAN     Plan of care Certification: 12/13/2022 to  1/13/.2023.     Outpatient Physical Therapy 2 times weekly for 4 weeks to include the following interventions: Electrical Stimulation IFC, Manual Therapy, Moist Heat/ Ice, Patient Education, Therapeutic Activities, and Therapeutic Exercise, ASTYM, Kinesiotaping PRN, Functional Dry Needling    Mohinder De Luna, PT

## 2023-01-23 ENCOUNTER — CLINICAL SUPPORT (OUTPATIENT)
Dept: REHABILITATION | Facility: HOSPITAL | Age: 78
End: 2023-01-23
Payer: MEDICARE

## 2023-01-23 DIAGNOSIS — R29.3 POSTURE IMBALANCE: ICD-10-CM

## 2023-01-23 DIAGNOSIS — R53.1 WEAKNESS: ICD-10-CM

## 2023-01-23 DIAGNOSIS — G57.02 PIRIFORMIS SYNDROME OF LEFT SIDE: Primary | ICD-10-CM

## 2023-01-23 PROCEDURE — 97110 THERAPEUTIC EXERCISES: CPT | Mod: HCNC,PN

## 2023-01-23 PROCEDURE — 97112 NEUROMUSCULAR REEDUCATION: CPT | Mod: HCNC,PN

## 2023-01-23 NOTE — PROGRESS NOTES
[facility]   Physical Therapy Treatment Note     Name: Patsy Perez  Clinic Number: [id number]    Therapy Diagnosis:   Encounter Diagnoses   Name Primary?    Piriformis syndrome of left side Yes    Weakness     Posture imbalance      Physician: Christ Yepez MD    Visit Date: 1/23/2023    Physician: Christ Yepez MD     Physician Orders: PT Eval and Treat   Medical Diagnosis from Referral: Sciatica of left side  Evaluation Date: 12/13/2022  Authorization Period Expiration: 1/13/2023  Plan of Care Expiration: 1/13/2023  Visit # / Visits authorized: 7/ 8    PTA Visit #: 0/5     Time In: 10:15  Time Out: 11:15  Total Billable Time: 54 minutes    SUBJECTIVE     Pt reports: reduced hip pain frequency and intensity  She was compliant with home exercise program.  Response to previous treatment: reduced pain with walking  Functional change: improved ability to maintain standing on left     Pain: 1/10  Location: left buttocks      OBJECTIVE     Objective Measures updated at progress report unless specified.     Treatment     Patsy received the treatments listed below:      therapeutic exercises to develop strength, ROM, flexibility, and posture for 25 minutes including:  Bike  Flexibility of piriformis, it band, hamstrings  Standing neutral pelvis with hip hiking  Standing with neutral pelvis and opposite abduction  Med ex ext 40# 0-65 20 reps  KB squats with cues for terminal extension    Neuromuscular reeducation 29 minutes for tactile and visual cues to enhance gluteus medius and piriformis in single limb stance  Single limb stance with adequate facilitation of gluteus medius and minimus   Postural reeducation to enhance gluteus chapis activation and allow for longer hold times in single limb stance        manual therapy techniques: Soft tissue Mobilization were applied to the: left piriformois for 0 minutes, including:  Soft tissue mobilization and TPDN to left piriformis, glut  medius  Palpation Assessment to determine the necessity for Functional Dry Needling  .     Patient provided written and verbal consent to Functional Dry Needling     Written Handout on response to FDN provided: yes    Patsy demonstrated good  understanding of the education provided.     Patient demonstrated appropriate response to Functional Dry Needling. .  Good rhythmical contractions observed with estim to treated muscle groups         Patient Education and Home Exercises     Home Exercises Provided and Patient Education Provided     Education provided:   - posture    Written Home Exercises Provided: Patient instructed to cont prior HEP. Exercises were reviewed and Patsy was able to demonstrate them prior to the end of the session.  Patsy demonstrated good  understanding of the education provided. See EMR under Patient Instructions for exercises provided during therapy sessions    ASSESSMENT     Patient reports significant pain reduction with activity as well as reduced pain frequency.  Continues to require cues to allow for proper glteus chapis and medius activation which allows her to obtain and maintain proper pelvic alignment in standing, walking and single limb stance.  Patsy Is progressing well towards her goals.   Pt prognosis is Excellent.     Pt will continue to benefit from skilled outpatient physical therapy to address the deficits listed in the problem list box on initial evaluation, provide pt/family education and to maximize pt's level of independence in the home and community environment.     Pt's spiritual, cultural and educational needs considered and pt agreeable to plan of care and goals.     Anticipated barriers to physical therapy: none    GoShort Term Goals: 2 weeks   Patient will be independent with HEP.   Pain will reduce by 10% with prolonged walking without radicular symptoms.      Long Term Goals: 4 weeks   Patient will be painfree with recreational activities.  Patient will  exhibit an erect trunk posture while ambulating.   FOTO will improve by 6% to allow for return to recreational activities.als:     PLAN     Plan of care Certification: 12/13/2022 to 1/13/.2023.     Outpatient Physical Therapy 2 times weekly for 4 weeks to include the following interventions: Electrical Stimulation IFC, Manual Therapy, Moist Heat/ Ice, Patient Education, Therapeutic Activities, and Therapeutic Exercise, ASTYM, Kinesiotaping PRN, Functional Dry Needling    Mohinder De Luna, PT

## 2023-01-26 ENCOUNTER — CLINICAL SUPPORT (OUTPATIENT)
Dept: REHABILITATION | Facility: HOSPITAL | Age: 78
End: 2023-01-26
Payer: MEDICARE

## 2023-01-26 DIAGNOSIS — R53.1 WEAKNESS: ICD-10-CM

## 2023-01-26 DIAGNOSIS — G57.02 PIRIFORMIS SYNDROME OF LEFT SIDE: Primary | ICD-10-CM

## 2023-01-26 DIAGNOSIS — R29.3 POSTURE IMBALANCE: ICD-10-CM

## 2023-01-26 PROCEDURE — 97112 NEUROMUSCULAR REEDUCATION: CPT | Mod: HCNC,PN

## 2023-01-26 PROCEDURE — 97110 THERAPEUTIC EXERCISES: CPT | Mod: HCNC,PN

## 2023-01-26 NOTE — PLAN OF CARE
[facility]   Discharge Note    Name: Patsy Perez  Clinic Number: [id number]    Therapy Diagnosis:   Encounter Diagnoses   Name Primary?    Piriformis syndrome of left side Yes    Weakness     Posture imbalance      Physician: Christ Yepez MD  Physician Orders: PT Eval and Treat   Medical Diagnosis from Referral: Sciatica of left side  Evaluation Date: 12/13/2022    Date of Last visit: 1/26/2023  Total Visits Received: 9    ASSESSMENT      Patient exhibits significant pain reduction in left hip allowing her to maintain a level pelvis and erect trunk posture for longer periods. Patient is continuing HEP at gym. Left hip strength increased to 4+/5 strength grossly.     Discharge reason: Patient has met all of his/her goals    Discharge FOTO Score: 24%    Goals: All goals met  Short Term Goals: 2 weeks   Patient will be independent with HEP.   Pain will reduce by 10% with prolonged walking without radicular symptoms.      Long Term Goals: 4 weeks   Patient will be painfree with recreational activities.  Patient will exhibit an erect trunk posture while ambulating.   FOTO will improve by 6% to allow for return to recreational activities.     PLAN   This patient is discharged from Physical Therapy      Mohinder De Luna, PT

## 2023-01-26 NOTE — PROGRESS NOTES
[facility]   Physical Therapy Treatment Note     Name: Patsy Perez  Clinic Number: [id number]    Therapy Diagnosis:   Encounter Diagnoses   Name Primary?    Piriformis syndrome of left side Yes    Weakness     Posture imbalance      Physician: Christ Yepez MD    Visit Date: 1/26/2023    Physician: Christ Yepez MD     Physician Orders: PT Eval and Treat   Medical Diagnosis from Referral: Sciatica of left side  Evaluation Date: 12/13/2022  Authorization Period Expiration: 1/13/2023  Plan of Care Expiration: 1/13/2023  Visit # / Visits authorized: 8/ 8    PTA Visit #: 0/5     Time In: 9:20  Time Out: 10:15  Total Billable Time: 54 minutes    SUBJECTIVE     Pt reports: significant pain reduction frequency and intensity  She was compliant with home exercise program.  Response to previous treatment: reduced pain with walking  Functional change: improved ability to maintain standing on left     Pain: 1/10  Location: left buttocks      OBJECTIVE     Objective Measures updated at progress report unless specified.     Treatment     Patsy received the treatments listed below:      therapeutic exercises to develop strength, ROM, flexibility, and posture for 25 minutes including:  Bike  Flexibility of piriformis, it band, hamstrings  Standing neutral pelvis with hip hiking  Standing with neutral pelvis and opposite abduction  Sled  KB squats with cues for terminal extension  Squat to single limb stance    Neuromuscular reeducation 29 minutes for tactile and visual cues to enhance gluteus medius and piriformis in single limb stance  Single limb stance with adequate facilitation of gluteus medius and minimus   Postural reeducation to enhance gluteus chapis activation and allow for longer hold times in single limb stance      Patient Education and Home Exercises     Home Exercises Provided and Patient Education Provided     Education provided:   - posture, HEP    Written Home Exercises  Provided: Patient instructed to cont prior HEP. Exercises were reviewed and Patsy was able to demonstrate them prior to the end of the session.  Patsy demonstrated good  understanding of the education provided. See EMR under Patient Instructions for exercises provided during therapy sessions    ASSESSMENT     Patient reports significant pain reduction with activity as well as reduced pain frequency allowing increased standing tolerance in community.  Patsy exhibits steady improvement in pelvic control and body mechanics while lifting. Will continue to progress at the gym.     Patsy Is progressing well towards her goals.   Pt prognosis is Excellent.       Pt's spiritual, cultural and educational needs considered and pt agreeable to plan of care and goals.     Anticipated barriers to physical therapy: none    Goals ALL MET  Short Term Goals: 2 weeks   Patient will be independent with HEP.   Pain will reduce by 10% with prolonged walking without radicular symptoms.      Long Term Goals: 4 weeks   Patient will be painfree with recreational activities.  Patient will exhibit an erect trunk posture while ambulating.   FOTO will improve by 6% to allow for return to recreational activities.als:     PLAN     Discharged to [facility] with all goals met.     Mohinder De Luna, PT

## 2023-02-07 DIAGNOSIS — Z00.00 ENCOUNTER FOR MEDICARE ANNUAL WELLNESS EXAM: ICD-10-CM

## 2023-02-09 DIAGNOSIS — Z00.00 ENCOUNTER FOR MEDICARE ANNUAL WELLNESS EXAM: ICD-10-CM

## 2023-02-16 LAB
LEFT EYE DM RETINOPATHY: NEGATIVE
RIGHT EYE DM RETINOPATHY: NEGATIVE

## 2023-03-01 DIAGNOSIS — E11.9 TYPE 2 DIABETES MELLITUS WITHOUT COMPLICATION: ICD-10-CM

## 2023-03-15 ENCOUNTER — LAB VISIT (OUTPATIENT)
Dept: LAB | Facility: HOSPITAL | Age: 78
End: 2023-03-15
Attending: FAMILY MEDICINE
Payer: MEDICARE

## 2023-03-15 ENCOUNTER — OFFICE VISIT (OUTPATIENT)
Dept: INTERNAL MEDICINE | Facility: CLINIC | Age: 78
End: 2023-03-15
Payer: MEDICARE

## 2023-03-15 VITALS
HEART RATE: 69 BPM | HEIGHT: 64 IN | WEIGHT: 177.5 LBS | OXYGEN SATURATION: 99 % | BODY MASS INDEX: 30.3 KG/M2 | SYSTOLIC BLOOD PRESSURE: 142 MMHG | DIASTOLIC BLOOD PRESSURE: 78 MMHG

## 2023-03-15 DIAGNOSIS — N18.31 STAGE 3A CHRONIC KIDNEY DISEASE: ICD-10-CM

## 2023-03-15 DIAGNOSIS — E11.69 HYPERLIPIDEMIA ASSOCIATED WITH TYPE 2 DIABETES MELLITUS: ICD-10-CM

## 2023-03-15 DIAGNOSIS — I10 HYPERTENSION, UNSPECIFIED TYPE: ICD-10-CM

## 2023-03-15 DIAGNOSIS — E78.5 HYPERLIPIDEMIA ASSOCIATED WITH TYPE 2 DIABETES MELLITUS: ICD-10-CM

## 2023-03-15 DIAGNOSIS — E11.9 TYPE 2 DIABETES MELLITUS WITHOUT COMPLICATION: ICD-10-CM

## 2023-03-15 DIAGNOSIS — Z00.00 ENCOUNTER FOR PREVENTIVE HEALTH EXAMINATION: Primary | ICD-10-CM

## 2023-03-15 DIAGNOSIS — Z00.00 ENCOUNTER FOR MEDICARE ANNUAL WELLNESS EXAM: ICD-10-CM

## 2023-03-15 DIAGNOSIS — E03.9 HYPOTHYROIDISM, UNSPECIFIED TYPE: ICD-10-CM

## 2023-03-15 LAB
ALBUMIN/CREAT UR: 36.8 UG/MG (ref 0–30)
CHOLEST SERPL-MCNC: 125 MG/DL (ref 120–199)
CHOLEST/HDLC SERPL: 2.7 {RATIO} (ref 2–5)
CREAT UR-MCNC: 19 MG/DL (ref 15–325)
HDLC SERPL-MCNC: 47 MG/DL (ref 40–75)
HDLC SERPL: 37.6 % (ref 20–50)
LDLC SERPL CALC-MCNC: 53 MG/DL (ref 63–159)
MICROALBUMIN UR DL<=1MG/L-MCNC: 7 UG/ML
NONHDLC SERPL-MCNC: 78 MG/DL
TRIGL SERPL-MCNC: 125 MG/DL (ref 30–150)

## 2023-03-15 PROCEDURE — 99999 PR PBB SHADOW E&M-EST. PATIENT-LVL V: ICD-10-PCS | Mod: PBBFAC,HCNC,, | Performed by: NURSE PRACTITIONER

## 2023-03-15 PROCEDURE — G0439 PPPS, SUBSEQ VISIT: HCPCS | Mod: HCNC,S$GLB,, | Performed by: NURSE PRACTITIONER

## 2023-03-15 PROCEDURE — 3288F PR FALLS RISK ASSESSMENT DOCUMENTED: ICD-10-PCS | Mod: HCNC,CPTII,S$GLB, | Performed by: NURSE PRACTITIONER

## 2023-03-15 PROCEDURE — 1159F MED LIST DOCD IN RCRD: CPT | Mod: HCNC,CPTII,S$GLB, | Performed by: NURSE PRACTITIONER

## 2023-03-15 PROCEDURE — 1170F PR FUNCTIONAL STATUS ASSESSED: ICD-10-PCS | Mod: HCNC,CPTII,S$GLB, | Performed by: NURSE PRACTITIONER

## 2023-03-15 PROCEDURE — 1159F PR MEDICATION LIST DOCUMENTED IN MEDICAL RECORD: ICD-10-PCS | Mod: HCNC,CPTII,S$GLB, | Performed by: NURSE PRACTITIONER

## 2023-03-15 PROCEDURE — 3078F PR MOST RECENT DIASTOLIC BLOOD PRESSURE < 80 MM HG: ICD-10-PCS | Mod: HCNC,CPTII,S$GLB, | Performed by: NURSE PRACTITIONER

## 2023-03-15 PROCEDURE — G0439 PR MEDICARE ANNUAL WELLNESS SUBSEQUENT VISIT: ICD-10-PCS | Mod: HCNC,S$GLB,, | Performed by: NURSE PRACTITIONER

## 2023-03-15 PROCEDURE — 1160F PR REVIEW ALL MEDS BY PRESCRIBER/CLIN PHARMACIST DOCUMENTED: ICD-10-PCS | Mod: HCNC,CPTII,S$GLB, | Performed by: NURSE PRACTITIONER

## 2023-03-15 PROCEDURE — 82570 ASSAY OF URINE CREATININE: CPT | Mod: HCNC | Performed by: NURSE PRACTITIONER

## 2023-03-15 PROCEDURE — 1101F PT FALLS ASSESS-DOCD LE1/YR: CPT | Mod: HCNC,CPTII,S$GLB, | Performed by: NURSE PRACTITIONER

## 2023-03-15 PROCEDURE — 3078F DIAST BP <80 MM HG: CPT | Mod: HCNC,CPTII,S$GLB, | Performed by: NURSE PRACTITIONER

## 2023-03-15 PROCEDURE — 1170F FXNL STATUS ASSESSED: CPT | Mod: HCNC,CPTII,S$GLB, | Performed by: NURSE PRACTITIONER

## 2023-03-15 PROCEDURE — 1126F PR PAIN SEVERITY QUANTIFIED, NO PAIN PRESENT: ICD-10-PCS | Mod: HCNC,CPTII,S$GLB, | Performed by: NURSE PRACTITIONER

## 2023-03-15 PROCEDURE — 36415 COLL VENOUS BLD VENIPUNCTURE: CPT | Mod: HCNC | Performed by: FAMILY MEDICINE

## 2023-03-15 PROCEDURE — 99999 PR PBB SHADOW E&M-EST. PATIENT-LVL V: CPT | Mod: PBBFAC,HCNC,, | Performed by: NURSE PRACTITIONER

## 2023-03-15 PROCEDURE — 80061 LIPID PANEL: CPT | Mod: HCNC | Performed by: FAMILY MEDICINE

## 2023-03-15 PROCEDURE — 1126F AMNT PAIN NOTED NONE PRSNT: CPT | Mod: HCNC,CPTII,S$GLB, | Performed by: NURSE PRACTITIONER

## 2023-03-15 PROCEDURE — 3077F PR MOST RECENT SYSTOLIC BLOOD PRESSURE >= 140 MM HG: ICD-10-PCS | Mod: HCNC,CPTII,S$GLB, | Performed by: NURSE PRACTITIONER

## 2023-03-15 PROCEDURE — 1160F RVW MEDS BY RX/DR IN RCRD: CPT | Mod: HCNC,CPTII,S$GLB, | Performed by: NURSE PRACTITIONER

## 2023-03-15 PROCEDURE — 1101F PR PT FALLS ASSESS DOC 0-1 FALLS W/OUT INJ PAST YR: ICD-10-PCS | Mod: HCNC,CPTII,S$GLB, | Performed by: NURSE PRACTITIONER

## 2023-03-15 PROCEDURE — 3077F SYST BP >= 140 MM HG: CPT | Mod: HCNC,CPTII,S$GLB, | Performed by: NURSE PRACTITIONER

## 2023-03-15 PROCEDURE — 3288F FALL RISK ASSESSMENT DOCD: CPT | Mod: HCNC,CPTII,S$GLB, | Performed by: NURSE PRACTITIONER

## 2023-03-15 NOTE — PATIENT INSTRUCTIONS
Counseling and Referral of Other Preventative  (Italic type indicates deductible and co-insurance are waived)    Patient Name: Patsy Perez  Today's Date: 3/15/2023    Health Maintenance       Date Due Completion Date    Eye Exam 02/08/2023 2/8/2022    Foot Exam 02/21/2023 2/21/2022    Lipid Panel 02/22/2023 2/22/2022    Diabetes Urine Screening 02/22/2023 2/22/2022    COVID-19 Vaccine (3 - Booster for Pfizer series) 03/15/2024 (Originally 3/28/2021) 1/31/2021    Hemoglobin A1c 06/08/2023 12/8/2022    Low Dose Statin 03/15/2024 3/15/2023    DEXA Scan 11/10/2025 11/10/2022    TETANUS VACCINE 05/18/2028 5/18/2018    Colorectal Cancer Screening 07/22/2029 7/22/2019        Orders Placed This Encounter   Procedures    MICROALBUMIN / CREATININE RATIO URINE     The following information is provided to all patients.  This information is to help you find resources for any of the problems found today that may be affecting your health:                Living healthy guide: www.[facility].[place].gov      Understanding Diabetes: www.diabetes.org      Eating healthy: www.cdc.gov/healthyweight      CDC home safety checklist: www.cdc.gov/steadi/patient.html      Agency on Aging: www.goea.[place].[facility]      Alcoholics anonymous (AA): www.aa.org      Physical Activity: www.vernon.nih.gov/vk7foka      Tobacco use: www.quitwithusla.org

## 2023-03-15 NOTE — PROGRESS NOTES
"  Patsy Perez presented for a  Medicare AWV and comprehensive Health Risk Assessment today. The following components were reviewed and updated:    Medical history  Family History  Social history  Allergies and Current Medications  Health Risk Assessment  Health Maintenance  Care Team         ** See Completed Assessments for Annual Wellness Visit within the encounter summary.**         The following assessments were completed:  Living Situation  CAGE  Depression Screening  Timed Get Up and Go  Whisper Test  Cognitive Function Screening  Nutrition Screening  ADL Screening  PAQ Screening        Vitals:    03/15/23 0906   BP: (!) 142/78   Pulse: 69   SpO2: 99%   Weight: 80.5 kg (177 lb 7.5 oz)   Height: 5' 4" (1.626 m)     Body mass index is 30.46 kg/m².  Physical Exam  Vitals and nursing note reviewed.   Constitutional:       Appearance: She is well-developed.   HENT:      Head: Normocephalic.   Cardiovascular:      Rate and Rhythm: Normal rate and regular rhythm.      Heart sounds: Normal heart sounds.   Pulmonary:      Effort: Pulmonary effort is normal. No respiratory distress.      Breath sounds: Normal breath sounds.   Abdominal:      Palpations: Abdomen is soft. There is no mass.      Tenderness: There is no abdominal tenderness.   Musculoskeletal:         General: Normal range of motion.   Skin:     General: Skin is warm and dry.   Neurological:      Mental Status: She is alert and oriented to person, place, and time.      Motor: No abnormal muscle tone.   Psychiatric:         Speech: Speech normal.         Behavior: Behavior normal.             Diagnoses and health risks identified today and associated recommendations/orders:    1. Encounter for preventive health examination     Review for Opioid Screening: Pt does not have Rx for Opioids      Review for Substance Use Disorders:Alcohol use, see flow sheet for detail No drug use per chart     Signed AMBAR for eye exam  Defer foot exam to PCP  Declines COVID " booster    Encouraged healthy diet and exercise as tolerated to help bring BMI into normal range.      2. Hypothyroidism, unspecified type  Stable and controlled. Continue current treatment plan as previously prescribed with your PCP.      3. Hyperlipidemia associated with type 2 diabetes mellitus  A1c 6.3  Lipid today  Advised to follow up with PCP for further evaluation and recommendations. Patient expressed understanding.    - MICROALBUMIN / CREATININE RATIO URINE    4. Stage 3a chronic kidney disease  Stable. Continue current treatment plan as previously prescribed with your  pcp     5. Hypertension, unspecified type  BP elevated at today's visit. Reports always elevated at office. Reports home readings 130s/60s. Reports machine has been correlated recently.  Advised patient to monitor BP (keep a log) and if continues to stay elevated (greater than 130/80) to follow up with PCP for further evaluation and treatment. She expressed understanding.       6. Encounter for Medicare annual wellness exam  - Ambulatory Referral/Consult to Enhanced Annual Wellness Visit (eAWV)      Provided Patsy with a 5-10 year written screening schedule and personal prevention plan. Recommendations were developed using the USPSTF age appropriate recommendations. Education, counseling, and referrals were provided as needed. After Visit Summary printed and given to patient which includes a list of additional screenings\tests needed.    Follow up in about 1 year (around 3/15/2024) for awv.    Mirela Pabon NP  I offered to discuss advanced care planning, including how to pick a person who would make decisions for you if you were unable to make them for yourself, called a health care power of , and what kind of decisions you might make such as use of life sustaining treatments such as ventilators and tube feeding when faced with a life limiting illness recorded on a living will that they will need to know. (How you want to be  cared for as you near the end of your natural life)     X  Patient has advanced directives written and agrees to provide copies to the institution.

## 2023-03-29 ENCOUNTER — PATIENT OUTREACH (OUTPATIENT)
Dept: ADMINISTRATIVE | Facility: HOSPITAL | Age: 78
End: 2023-03-29
Payer: MEDICARE

## 2023-05-10 RX ORDER — AMLODIPINE BESYLATE 10 MG/1
TABLET ORAL
Qty: 90 TABLET | Refills: 1 | Status: SHIPPED | OUTPATIENT
Start: 2023-05-10 | End: 2023-10-16 | Stop reason: SDUPTHER

## 2023-05-10 NOTE — TELEPHONE ENCOUNTER
Refill Routing Note   Medication(s) are not appropriate for processing by [facility] for the following reason(s):      Required vitals abnormal    [facility] action(s):  Defer Labs due   Medication Therapy Plan: FOVS      Appointments  past 12m or future 3m with PCP    Date Provider   Last Visit   12/8/2022 Christ Yepez MD   Next Visit   6/15/2023 Christ Yepez MD   ED visits in past 90 days: 0        Note composed:1:41 PM 05/10/2023

## 2023-05-10 NOTE — TELEPHONE ENCOUNTER
Care Due:                  Date            Visit Type   Department     Provider  --------------------------------------------------------------------------------                                MYCHART                              FOLLOWUP/OF  [facility] INTERNAL  Last Visit: 12-      [facility] VISIT   MEDICINE       Christ Yepez                              EP -                              PRIMARY      [facility] INTERNAL  Next Visit: 06-      CARE ([facility])   MEDICINE       Christ Yepez                                                            Last  Test          Frequency    Reason                     Performed    Due Date  --------------------------------------------------------------------------------    HBA1C.......  6 months...  glimepiride, metFORMIN...  12- 06-    Health [facility] Embedded Care Due Messages. Reference number: [id number].   5/10/2023 12:47:53 PM CDT

## 2023-06-15 ENCOUNTER — LAB VISIT (OUTPATIENT)
Dept: LAB | Facility: HOSPITAL | Age: 78
End: 2023-06-15
Attending: FAMILY MEDICINE
Payer: MEDICARE

## 2023-06-15 ENCOUNTER — OFFICE VISIT (OUTPATIENT)
Dept: INTERNAL MEDICINE | Facility: CLINIC | Age: 78
End: 2023-06-15
Payer: MEDICARE

## 2023-06-15 VITALS
DIASTOLIC BLOOD PRESSURE: 71 MMHG | TEMPERATURE: 97 F | BODY MASS INDEX: 29.92 KG/M2 | HEART RATE: 72 BPM | OXYGEN SATURATION: 96 % | SYSTOLIC BLOOD PRESSURE: 138 MMHG | HEIGHT: 64 IN | WEIGHT: 175.25 LBS

## 2023-06-15 DIAGNOSIS — R19.7 DIARRHEA, UNSPECIFIED TYPE: ICD-10-CM

## 2023-06-15 DIAGNOSIS — E11.8 CONTROLLED TYPE 2 DIABETES MELLITUS WITH COMPLICATION, WITHOUT LONG-TERM CURRENT USE OF INSULIN: ICD-10-CM

## 2023-06-15 DIAGNOSIS — N18.31 STAGE 3A CHRONIC KIDNEY DISEASE: ICD-10-CM

## 2023-06-15 DIAGNOSIS — E03.9 HYPOTHYROIDISM, UNSPECIFIED TYPE: ICD-10-CM

## 2023-06-15 DIAGNOSIS — I10 HYPERTENSION, UNSPECIFIED TYPE: ICD-10-CM

## 2023-06-15 DIAGNOSIS — K76.0 FATTY LIVER: ICD-10-CM

## 2023-06-15 DIAGNOSIS — Z86.69 H/O SCIATICA: ICD-10-CM

## 2023-06-15 DIAGNOSIS — E78.5 HYPERLIPIDEMIA ASSOCIATED WITH TYPE 2 DIABETES MELLITUS: ICD-10-CM

## 2023-06-15 DIAGNOSIS — J30.9 ALLERGIC RHINITIS, UNSPECIFIED SEASONALITY, UNSPECIFIED TRIGGER: ICD-10-CM

## 2023-06-15 DIAGNOSIS — E11.69 HYPERLIPIDEMIA ASSOCIATED WITH TYPE 2 DIABETES MELLITUS: ICD-10-CM

## 2023-06-15 DIAGNOSIS — E03.9 HYPOTHYROIDISM, UNSPECIFIED TYPE: Primary | ICD-10-CM

## 2023-06-15 LAB
ALBUMIN SERPL BCP-MCNC: 3.9 G/DL (ref 3.5–5.2)
ALP SERPL-CCNC: 71 U/L (ref 55–135)
ALT SERPL W/O P-5'-P-CCNC: 30 U/L (ref 10–44)
ANION GAP SERPL CALC-SCNC: 16 MMOL/L (ref 8–16)
AST SERPL-CCNC: 28 U/L (ref 10–40)
BASOPHILS # BLD AUTO: 0.09 K/UL (ref 0–0.2)
BASOPHILS NFR BLD: 0.9 % (ref 0–1.9)
BILIRUB SERPL-MCNC: 0.4 MG/DL (ref 0.1–1)
BUN SERPL-MCNC: 17 MG/DL (ref 8–23)
CALCIUM SERPL-MCNC: 9.9 MG/DL (ref 8.7–10.5)
CHLORIDE SERPL-SCNC: 104 MMOL/L (ref 95–110)
CO2 SERPL-SCNC: 20 MMOL/L (ref 23–29)
CREAT SERPL-MCNC: 1.1 MG/DL (ref 0.5–1.4)
DIFFERENTIAL METHOD: ABNORMAL
EOSINOPHIL # BLD AUTO: 0.1 K/UL (ref 0–0.5)
EOSINOPHIL NFR BLD: 1.1 % (ref 0–8)
ERYTHROCYTE [DISTWIDTH] IN BLOOD BY AUTOMATED COUNT: 13.5 % (ref 11.5–14.5)
EST. GFR  (NO RACE VARIABLE): 52.4 ML/MIN/1.73 M^2
ESTIMATED AVG GLUCOSE: 137 MG/DL (ref 68–131)
GLUCOSE SERPL-MCNC: 111 MG/DL (ref 70–110)
HBA1C MFR BLD: 6.4 % (ref 4–5.6)
HCT VFR BLD AUTO: 42.7 % (ref 37–48.5)
HGB BLD-MCNC: 13.4 G/DL (ref 12–16)
IMM GRANULOCYTES # BLD AUTO: 0.03 K/UL (ref 0–0.04)
IMM GRANULOCYTES NFR BLD AUTO: 0.3 % (ref 0–0.5)
LYMPHOCYTES # BLD AUTO: 3 K/UL (ref 1–4.8)
LYMPHOCYTES NFR BLD: 28.8 % (ref 18–48)
MCH RBC QN AUTO: 29.1 PG (ref 27–31)
MCHC RBC AUTO-ENTMCNC: 31.4 G/DL (ref 32–36)
MCV RBC AUTO: 93 FL (ref 82–98)
MONOCYTES # BLD AUTO: 0.7 K/UL (ref 0.3–1)
MONOCYTES NFR BLD: 6.9 % (ref 4–15)
NEUTROPHILS # BLD AUTO: 6.6 K/UL (ref 1.8–7.7)
NEUTROPHILS NFR BLD: 62 % (ref 38–73)
NRBC BLD-RTO: 0 /100 WBC
PLATELET # BLD AUTO: 341 K/UL (ref 150–450)
PMV BLD AUTO: 11.9 FL (ref 9.2–12.9)
POTASSIUM SERPL-SCNC: 5.2 MMOL/L (ref 3.5–5.1)
PROT SERPL-MCNC: 7.2 G/DL (ref 6–8.4)
RBC # BLD AUTO: 4.61 M/UL (ref 4–5.4)
SODIUM SERPL-SCNC: 140 MMOL/L (ref 136–145)
TSH SERPL DL<=0.005 MIU/L-ACNC: 0.93 UIU/ML (ref 0.4–4)
WBC # BLD AUTO: 10.57 K/UL (ref 3.9–12.7)

## 2023-06-15 PROCEDURE — 3288F PR FALLS RISK ASSESSMENT DOCUMENTED: ICD-10-PCS | Mod: CPTII,S$GLB,, | Performed by: FAMILY MEDICINE

## 2023-06-15 PROCEDURE — 84443 ASSAY THYROID STIM HORMONE: CPT | Performed by: FAMILY MEDICINE

## 2023-06-15 PROCEDURE — 3078F DIAST BP <80 MM HG: CPT | Mod: CPTII,S$GLB,, | Performed by: FAMILY MEDICINE

## 2023-06-15 PROCEDURE — 1101F PR PT FALLS ASSESS DOC 0-1 FALLS W/OUT INJ PAST YR: ICD-10-PCS | Mod: CPTII,S$GLB,, | Performed by: FAMILY MEDICINE

## 2023-06-15 PROCEDURE — 99999 PR PBB SHADOW E&M-EST. PATIENT-LVL IV: ICD-10-PCS | Mod: PBBFAC,,, | Performed by: FAMILY MEDICINE

## 2023-06-15 PROCEDURE — 85025 COMPLETE CBC W/AUTO DIFF WBC: CPT | Performed by: FAMILY MEDICINE

## 2023-06-15 PROCEDURE — 1159F PR MEDICATION LIST DOCUMENTED IN MEDICAL RECORD: ICD-10-PCS | Mod: CPTII,S$GLB,, | Performed by: FAMILY MEDICINE

## 2023-06-15 PROCEDURE — 3288F FALL RISK ASSESSMENT DOCD: CPT | Mod: CPTII,S$GLB,, | Performed by: FAMILY MEDICINE

## 2023-06-15 PROCEDURE — 99214 PR OFFICE/OUTPT VISIT, EST, LEVL IV, 30-39 MIN: ICD-10-PCS | Mod: S$GLB,,, | Performed by: FAMILY MEDICINE

## 2023-06-15 PROCEDURE — 3078F PR MOST RECENT DIASTOLIC BLOOD PRESSURE < 80 MM HG: ICD-10-PCS | Mod: CPTII,S$GLB,, | Performed by: FAMILY MEDICINE

## 2023-06-15 PROCEDURE — 83036 HEMOGLOBIN GLYCOSYLATED A1C: CPT | Performed by: FAMILY MEDICINE

## 2023-06-15 PROCEDURE — 3075F SYST BP GE 130 - 139MM HG: CPT | Mod: CPTII,S$GLB,, | Performed by: FAMILY MEDICINE

## 2023-06-15 PROCEDURE — 1101F PT FALLS ASSESS-DOCD LE1/YR: CPT | Mod: CPTII,S$GLB,, | Performed by: FAMILY MEDICINE

## 2023-06-15 PROCEDURE — 1126F PR PAIN SEVERITY QUANTIFIED, NO PAIN PRESENT: ICD-10-PCS | Mod: CPTII,S$GLB,, | Performed by: FAMILY MEDICINE

## 2023-06-15 PROCEDURE — 80053 COMPREHEN METABOLIC PANEL: CPT | Performed by: FAMILY MEDICINE

## 2023-06-15 PROCEDURE — 36415 COLL VENOUS BLD VENIPUNCTURE: CPT | Performed by: FAMILY MEDICINE

## 2023-06-15 PROCEDURE — 1159F MED LIST DOCD IN RCRD: CPT | Mod: CPTII,S$GLB,, | Performed by: FAMILY MEDICINE

## 2023-06-15 PROCEDURE — 99999 PR PBB SHADOW E&M-EST. PATIENT-LVL IV: CPT | Mod: PBBFAC,,, | Performed by: FAMILY MEDICINE

## 2023-06-15 PROCEDURE — 1126F AMNT PAIN NOTED NONE PRSNT: CPT | Mod: CPTII,S$GLB,, | Performed by: FAMILY MEDICINE

## 2023-06-15 PROCEDURE — 3075F PR MOST RECENT SYSTOLIC BLOOD PRESS GE 130-139MM HG: ICD-10-PCS | Mod: CPTII,S$GLB,, | Performed by: FAMILY MEDICINE

## 2023-06-15 PROCEDURE — 99214 OFFICE O/P EST MOD 30 MIN: CPT | Mod: S$GLB,,, | Performed by: FAMILY MEDICINE

## 2023-06-15 RX ORDER — LOPERAMIDE HCL 2 MG
2 TABLET ORAL 4 TIMES DAILY PRN
COMMUNITY

## 2023-06-15 RX ORDER — MULTIVITAMIN
1 TABLET ORAL DAILY
COMMUNITY

## 2023-06-15 NOTE — PROGRESS NOTES
Subjective:       Patient ID: Patsy Perez is a 77 y.o. female.    Chief Complaint: Follow-up (DM)    Follow-up  Pertinent negatives include no chest pain, chills or fever.     Patient Active Problem List   Diagnosis    Hypothyroidism    Hypertension    HLD (hyperlipidemia)    Gallstones    Fatty liver    DM II (diabetes mellitus, type II), controlled    CKD (chronic kidney disease)    Piriformis syndrome of left side    Weakness    Posture imbalance    H/O sciatica    Diarrhea    Allergic rhinitis       Past Medical History:   Diagnosis Date    CKD (chronic kidney disease)     DM II (diabetes mellitus, type II), controlled     Fatty liver     Gallstones     found on US    HLD (hyperlipidemia)     Hypertension     Hypothyroidism     s/p thyroidectomy       Past Surgical History:   Procedure Laterality Date    APPENDECTOMY      1985    CATARACT EXTRACTION      [facility]    LAPAROSCOPIC CHOLECYSTECTOMY N/A 09/21/2022    Procedure: CHOLECYSTECTOMY, LAPAROSCOPIC;  Surgeon: Roslyn Mckeon DO;  Location: [facility];  Service: General;  Laterality: N/A;    THYROIDECTOMY      1985       Family History   Problem Relation Age of Onset    Cancer Mother     Diabetes Mother     Breast cancer Mother         late 70s    Osteoarthritis Mother         double knee replacement    Alzheimer's disease Mother     Heart disease Father     Alzheimer's disease Maternal Aunt     Alzheimer's disease Maternal Uncle     Alzheimer's disease Maternal Grandmother        Social History     Tobacco Use   Smoking Status Former    Packs/day: 0.75    Years: 25.00    Pack years: 18.75    Types: Cigarettes   Smokeless Tobacco Never   Tobacco Comments    Quit 1990s       Wt Readings from Last 5 Encounters:   06/15/23 79.5 kg (175 lb 4.3 oz)   03/15/23 80.5 kg (177 lb 7.5 oz)   12/08/22 78 kg (172 lb 1.1 oz)   10/04/22 81.1 kg (178 lb 13 oz)   09/21/22 80.6 kg (177 lb 11.1 oz)       For further HPI  details, see assessment and plan.    Review of Systems   Constitutional:  Negative for chills and fever.   Respiratory:  Negative for shortness of breath and wheezing.    Cardiovascular:  Negative for chest pain.   Neurological:  Negative for dizziness.     Objective:      Vitals:    06/15/23 0836   BP: 138/71   Pulse: 72   Temp: 97 °F (36.1 °C)     Protective Sensation (w/ 10 gram monofilament):  Right: Intact  Left: Intact    Visual Inspection:  Normal -  Bilateral    Pedal Pulses:   Right: Present  Left: Present    Posterior Tibialis Pulses:   Right:Present  Left: Present      Physical Exam  Constitutional:       General: She is not in acute distress.     Appearance: Normal appearance. She is well-developed.   Cardiovascular:      Rate and Rhythm: Normal rate and regular rhythm.   Pulmonary:      Effort: Pulmonary effort is normal. No respiratory distress.      Breath sounds: Normal breath sounds.   Musculoskeletal:         General: Normal range of motion.      Right lower leg: No edema.      Left lower leg: No edema.   Neurological:      Mental Status: She is alert. She is not disoriented.   Psychiatric:         Mood and Affect: Mood normal.         Speech: Speech normal.       Assessment:       1. Hypothyroidism, unspecified type    2. Hyperlipidemia associated with type 2 diabetes mellitus    3. Stage 3a chronic kidney disease    4. Hypertension, unspecified type    5. Allergic rhinitis, unspecified seasonality, unspecified trigger    6. Fatty liver    7. Controlled type 2 diabetes mellitus with complication, without long-term current use of insulin    8. Diarrhea, unspecified type    9. H/O sciatica        Plan:         Hypertension   Amlodipine 10   Atenolol 50   Lisinopril 30  BP Readings from Last 3 Encounters:   06/15/23 138/71   03/15/23 (!) 142/78   12/08/22 128/72   Checks at home on validated machine  138/71 - last check - indicated on chart.  Does not consistently run over 140 systolic  Some  elevations w/ tension/stress - son is going thru a separation.        Type 2 diabetes  Glimepiride 2 mg (she cuts it in half to equal 1 mg)  Metformin 500 mg twice a day  Lab Results   Component Value Date    HGBA1C 6.3 (H) 12/08/2022   Has had no hypoglycemic events.  I have my concerns with her medications.  Metformin my concern is her chronic kidney disease.  At present time her renal function is still within safe range to continue medication.  We will need to continue to monitored.      I worry about the use of glimepiride given its risk of hypoglycemia and it is negative relationship with weight.  Her body mass index is 30 and she is been trying to lose weight.  Considered the use of GLP 1 medications however she is a remote history of pancreatitis.    SGLT 2 medications would be reasonable.  Will prescribe Jardiance at 10 mg.  In 3 months we will see each other again and if she is still doing well we will increase the dose to 25 mg.  Discussed that is positive relationship with weight, its renal protective purposes, and its A1c reducing abilities.  Discussed its risk of polyuria and increased amounts of glucose in her urine which increases the amounts of urinary tract infections and soft tissue infections.  Encouraged good hygiene and monitor for any infection.    Once she obtains this medication I want her to discontinue glimepiride.  I am removing it from her chart today.  Continue metformin.      Hypothyroidism  75 mcg Synthroid - continue  Lab Results   Component Value Date    TSH 0.884 09/08/2022         Hyperlipidemia   80 mg pravastatin - continue  tolerating  Lab Results   Component Value Date    LDLCALC 53.0 (L) 03/15/2023   Controlled at a goal      Fatty liver   Lab Results   Component Value Date    ALT 22 12/08/2022    AST 21 12/08/2022    ALKPHOS 59 12/08/2022    BILITOT 0.3 12/08/2022   Will monitor today  Seen on 6/8/22 ultrasound      CKD IIIa  Lab Results   Component Value Date    CREATININE  1.0 12/08/2022   GFR 59 12/22  Avoid nsaid  Monitor today    Sciatica resolved  Removed gabapentin.  PT and dry needling helped.    Allergic rhinitis  Flonase works well and sufficiently     Diarrhea  Since the time of her gallbladder surgery she is still suffers with ongoing diarrhea.  She address his this with success by use of fiber tablets in the occasional over-the-counter Imodium.  Seems to work.  Consider d/c metfomrin if necessary.    I am pleased to hear she is feeling good without any complaints.  I am very pleased to hear about her exercise habits.  She does both cardio and resistance training regularly.    Lab today  F/u in 4 months    This note was verbally dictated, please excuse any type errors.

## 2023-06-19 DIAGNOSIS — E87.5 HYPERKALEMIA: Primary | ICD-10-CM

## 2023-06-20 ENCOUNTER — PATIENT MESSAGE (OUTPATIENT)
Dept: INTERNAL MEDICINE | Facility: CLINIC | Age: 78
End: 2023-06-20
Payer: MEDICARE

## 2023-06-27 ENCOUNTER — LAB VISIT (OUTPATIENT)
Dept: LAB | Facility: HOSPITAL | Age: 78
End: 2023-06-27
Attending: FAMILY MEDICINE
Payer: MEDICARE

## 2023-06-27 DIAGNOSIS — E87.5 HYPERKALEMIA: ICD-10-CM

## 2023-06-27 LAB — POTASSIUM SERPL-SCNC: 4.4 MMOL/L (ref 3.5–5.1)

## 2023-06-27 PROCEDURE — 84132 ASSAY OF SERUM POTASSIUM: CPT | Performed by: FAMILY MEDICINE

## 2023-06-27 PROCEDURE — 36415 COLL VENOUS BLD VENIPUNCTURE: CPT | Performed by: FAMILY MEDICINE

## 2023-07-03 ENCOUNTER — TELEPHONE (OUTPATIENT)
Dept: INTERNAL MEDICINE | Facility: CLINIC | Age: 78
End: 2023-07-03
Payer: MEDICARE

## 2023-07-03 DIAGNOSIS — Z12.31 ENCOUNTER FOR SCREENING MAMMOGRAM FOR BREAST CANCER: Primary | ICD-10-CM

## 2023-07-03 DIAGNOSIS — Z12.31 ENCOUNTER FOR SCREENING MAMMOGRAM FOR MALIGNANT NEOPLASM OF BREAST: ICD-10-CM

## 2023-07-03 NOTE — TELEPHONE ENCOUNTER
----- Message from Pamella Pena sent at 7/3/2023  9:51 AM CDT -----  Caller is requesting to schedule their annual mammogram appointment.  Order is not listed in EPIC.  Please enter order and contact patient to schedule.     Name of Caller: pt    Where would they like the mammogram performed? [facility] after July 14    Best Call Back Number: [phone]      Additional Information:

## 2023-07-24 ENCOUNTER — OFFICE VISIT (OUTPATIENT)
Dept: INTERNAL MEDICINE | Facility: CLINIC | Age: 78
End: 2023-07-24
Payer: MEDICARE

## 2023-07-24 VITALS
OXYGEN SATURATION: 96 % | WEIGHT: 174.06 LBS | DIASTOLIC BLOOD PRESSURE: 62 MMHG | HEART RATE: 85 BPM | BODY MASS INDEX: 29.72 KG/M2 | SYSTOLIC BLOOD PRESSURE: 139 MMHG | TEMPERATURE: 98 F | HEIGHT: 64 IN

## 2023-07-24 DIAGNOSIS — E11.69 HYPERLIPIDEMIA ASSOCIATED WITH TYPE 2 DIABETES MELLITUS: Primary | ICD-10-CM

## 2023-07-24 DIAGNOSIS — I10 HYPERTENSION, UNSPECIFIED TYPE: ICD-10-CM

## 2023-07-24 DIAGNOSIS — E78.5 HYPERLIPIDEMIA ASSOCIATED WITH TYPE 2 DIABETES MELLITUS: Primary | ICD-10-CM

## 2023-07-24 DIAGNOSIS — E03.9 HYPOTHYROIDISM, UNSPECIFIED TYPE: ICD-10-CM

## 2023-07-24 DIAGNOSIS — E11.8 CONTROLLED TYPE 2 DIABETES MELLITUS WITH COMPLICATION, WITHOUT LONG-TERM CURRENT USE OF INSULIN: ICD-10-CM

## 2023-07-24 PROCEDURE — 99999 PR PBB SHADOW E&M-EST. PATIENT-LVL IV: ICD-10-PCS | Mod: PBBFAC,HCNC,, | Performed by: FAMILY MEDICINE

## 2023-07-24 PROCEDURE — 99214 OFFICE O/P EST MOD 30 MIN: CPT | Mod: HCNC,S$GLB,, | Performed by: FAMILY MEDICINE

## 2023-07-24 PROCEDURE — 1126F PR PAIN SEVERITY QUANTIFIED, NO PAIN PRESENT: ICD-10-PCS | Mod: HCNC,CPTII,S$GLB, | Performed by: FAMILY MEDICINE

## 2023-07-24 PROCEDURE — 3078F DIAST BP <80 MM HG: CPT | Mod: HCNC,CPTII,S$GLB, | Performed by: FAMILY MEDICINE

## 2023-07-24 PROCEDURE — 3075F PR MOST RECENT SYSTOLIC BLOOD PRESS GE 130-139MM HG: ICD-10-PCS | Mod: HCNC,CPTII,S$GLB, | Performed by: FAMILY MEDICINE

## 2023-07-24 PROCEDURE — 1159F PR MEDICATION LIST DOCUMENTED IN MEDICAL RECORD: ICD-10-PCS | Mod: HCNC,CPTII,S$GLB, | Performed by: FAMILY MEDICINE

## 2023-07-24 PROCEDURE — 3075F SYST BP GE 130 - 139MM HG: CPT | Mod: HCNC,CPTII,S$GLB, | Performed by: FAMILY MEDICINE

## 2023-07-24 PROCEDURE — 99999 PR PBB SHADOW E&M-EST. PATIENT-LVL IV: CPT | Mod: PBBFAC,HCNC,, | Performed by: FAMILY MEDICINE

## 2023-07-24 PROCEDURE — 99214 PR OFFICE/OUTPT VISIT, EST, LEVL IV, 30-39 MIN: ICD-10-PCS | Mod: HCNC,S$GLB,, | Performed by: FAMILY MEDICINE

## 2023-07-24 PROCEDURE — 1159F MED LIST DOCD IN RCRD: CPT | Mod: HCNC,CPTII,S$GLB, | Performed by: FAMILY MEDICINE

## 2023-07-24 PROCEDURE — 1126F AMNT PAIN NOTED NONE PRSNT: CPT | Mod: HCNC,CPTII,S$GLB, | Performed by: FAMILY MEDICINE

## 2023-07-24 PROCEDURE — 1101F PT FALLS ASSESS-DOCD LE1/YR: CPT | Mod: HCNC,CPTII,S$GLB, | Performed by: FAMILY MEDICINE

## 2023-07-24 PROCEDURE — 1101F PR PT FALLS ASSESS DOC 0-1 FALLS W/OUT INJ PAST YR: ICD-10-PCS | Mod: HCNC,CPTII,S$GLB, | Performed by: FAMILY MEDICINE

## 2023-07-24 PROCEDURE — 3044F HG A1C LEVEL LT 7.0%: CPT | Mod: HCNC,CPTII,S$GLB, | Performed by: FAMILY MEDICINE

## 2023-07-24 PROCEDURE — 3288F PR FALLS RISK ASSESSMENT DOCUMENTED: ICD-10-PCS | Mod: HCNC,CPTII,S$GLB, | Performed by: FAMILY MEDICINE

## 2023-07-24 PROCEDURE — 3288F FALL RISK ASSESSMENT DOCD: CPT | Mod: HCNC,CPTII,S$GLB, | Performed by: FAMILY MEDICINE

## 2023-07-24 PROCEDURE — 3044F PR MOST RECENT HEMOGLOBIN A1C LEVEL <7.0%: ICD-10-PCS | Mod: HCNC,CPTII,S$GLB, | Performed by: FAMILY MEDICINE

## 2023-07-24 PROCEDURE — 3078F PR MOST RECENT DIASTOLIC BLOOD PRESSURE < 80 MM HG: ICD-10-PCS | Mod: HCNC,CPTII,S$GLB, | Performed by: FAMILY MEDICINE

## 2023-07-24 NOTE — PROGRESS NOTES
Subjective:       Patient ID: Patsy Perez is a 77 y.o. female.    Chief Complaint: Follow-up (med)    Diabetes  She has type 2 diabetes mellitus. No MedicAlert identification noted. The initial diagnosis of diabetes was made 20 years ago. Pertinent negatives for hypoglycemia include no confusion, dizziness, headaches, hunger, mood changes, nervousness/anxiousness, pallor, seizures, sleepiness, speech difficulty, sweats or tremors. Pertinent negatives for diabetes include no blurred vision, no chest pain, no fatigue, no foot paresthesias, no foot ulcerations, no polydipsia, no polyphagia, no polyuria, no visual change, no weakness and no weight loss. Pertinent negatives for hypoglycemia complications include no blackouts, no hospitalization, no nocturnal hypoglycemia, no required assistance and no required glucagon injection. Symptoms are worsening. Diabetic complications include nephropathy. Pertinent negatives for diabetic complications include no autonomic neuropathy, CVA, heart disease, peripheral neuropathy, PVD or retinopathy. Risk factors for coronary artery disease include dyslipidemia, family history, hypertension, obesity and post-menopausal. Current diabetic treatment includes oral agent (dual therapy). She is compliant with treatment all of the time. Her weight is stable. She is following a generally healthy diet. Meal planning includes avoidance of concentrated sweets. She has not had a previous visit with a dietitian. She participates in exercise three times a week. She monitors blood glucose at home 1-2 x per day. Blood glucose monitoring compliance is excellent. Her home blood glucose trend is increasing steadily. She sees a podiatrist.Eye exam is current.     Patient Active Problem List   Diagnosis    Hypothyroidism    Hypertension    HLD (hyperlipidemia)    Gallstones    Fatty liver    DM II (diabetes mellitus, type II), controlled    CKD (chronic kidney disease)    Piriformis syndrome of left  side    Weakness    Posture imbalance    H/O sciatica    Diarrhea    Allergic rhinitis       Past Medical History:   Diagnosis Date    CKD (chronic kidney disease)     DM II (diabetes mellitus, type II), controlled     Fatty liver     Gallstones     found on US    HLD (hyperlipidemia)     Hypertension     Hypothyroidism     s/p thyroidectomy       Past Surgical History:   Procedure Laterality Date    APPENDECTOMY      1985    CATARACT EXTRACTION      HYSTERECTOMY      2009 - Dr. Th[facility]    LAPAROSCOPIC CHOLECYSTECTOMY N/A 09/21/2022    Procedure: CHOLECYSTECTOMY, LAPAROSCOPIC;  Surgeon: Roslyn Mckeon DO;  [facility] OR;  Service: General;  Laterality: N/A;    THYROIDECTOMY      1985       Family History   Problem Relation Age of Onset    Cancer Mother     Diabetes Mother     Breast cancer Mother         late 70s    Osteoarthritis Mother         double knee replacement    Alzheimer's disease Mother     Heart disease Father     Alzheimer's disease Maternal Aunt     Alzheimer's disease Maternal Uncle     Alzheimer's disease Maternal Grandmother        Social History     Tobacco Use   Smoking Status Former    Packs/day: 0.75    Years: 25.00    Pack years: 18.75    Types: Cigarettes   Smokeless Tobacco Never   Tobacco Comments    Quit 1990s       Wt Readings from Last 5 Encounters:   07/24/23 78.9 kg (174 lb 0.9 oz)   06/15/23 79.5 kg (175 lb 4.3 oz)   03/15/23 80.5 kg (177 lb 7.5 oz)   12/08/22 78 kg (172 lb 1.1 oz)   10/04/22 81.1 kg (178 lb 13 oz)       For further HPI details, see assessment and plan.    Review of Systems   Constitutional:  Negative for fatigue and weight loss.   Eyes:  Negative for blurred vision.   Cardiovascular:  Negative for chest pain.   Endocrine: Negative for polydipsia, polyphagia and polyuria.   Skin:  Negative for pallor.   Neurological:  Negative for dizziness, tremors, seizures, speech difficulty, weakness and headaches.   Psychiatric/Behavioral:  Negative  for confusion. The patient is not nervous/anxious.      Objective:      Vitals:    07/24/23 1321   BP: 139/62   Pulse:    Temp:        Physical Exam  Constitutional:       General: She is not in acute distress.     Appearance: She is not ill-appearing.   Pulmonary:      Effort: Pulmonary effort is normal. No respiratory distress.   Neurological:      General: No focal deficit present.      Mental Status: She is alert.   Psychiatric:         Mood and Affect: Mood normal.         Behavior: Behavior normal.       Assessment:       1. Hyperlipidemia associated with type 2 diabetes mellitus    2. Hypothyroidism, unspecified type    3. Hypertension, unspecified type    4. Controlled type 2 diabetes mellitus with complication, without long-term current use of insulin        Plan:   Hyperlipidemia associated with type 2 diabetes mellitus    Hypothyroidism, unspecified type    Hypertension, unspecified type  -     Comprehensive Metabolic Panel; Future; Expected date: 07/24/2023    Controlled type 2 diabetes mellitus with complication, without long-term current use of insulin  -     Hemoglobin A1C; Future; Expected date: 07/24/2023    Other orders  -     empagliflozin (JARDIANCE) 25 mg tablet; Take 1 tablet (25 mg total) by mouth once daily.  Dispense: 90 tablet; Refill: 1        Diabetes 2  Patient presents sooner than our scheduled follow-up out of concern that her blood sugar levels are running higher than expected.      At our last visit we discontinued the glimepiride.  I add Jardiance at 10 mg.  She is had no issues.  She is not having any urinary problems here she is tolerated the medication well.  Her sugars are variable but are rising his highest 200.  Given it has been several weeks since she is been on the medication we will increase the dose to 25 mg.  I am okay if she wants to finished what she has by taking 2 to equal 20 mg.  Once that is complete she will start the 25 mg dose.  Continue the metformin.  We have  a follow up scheduled in October.  Today I am ordering an A1c and a metabolic panel to be run a few days prior to that visit so we can review the results together.      Hypertension   Her blood pressure was elevated in the office today but she is been checking on a home machine that we have trusted in the past because we have checked it.  Continue current medications as is.  Atenolol 50, lisinopril 30, amlodipine 10    Hyperlipidemia   Her last LDL was controlled with the use of her statin.  No change to that medication either.  Continue medication    Hypothyroidism   Her last TSH was normal as of June.  Continue Synthroid 75 mcg as previously prescribed    I appreciate her efforts in monitoring her chronic conditions and keeping an active lifestyle with cardiovascular and resistance training via regular scheduled exercise.  Encouraged her to keep up the good work and we will see each other in roughly 3 months      This note was verbally dictated, please excuse any type errors.

## 2023-08-02 ENCOUNTER — HOSPITAL ENCOUNTER (OUTPATIENT)
Dept: RADIOLOGY | Facility: HOSPITAL | Age: 78
Discharge: HOME | End: 2023-08-02
Attending: FAMILY MEDICINE
Payer: MEDICARE

## 2023-08-02 DIAGNOSIS — Z12.31 ENCOUNTER FOR SCREENING MAMMOGRAM FOR BREAST CANCER: ICD-10-CM

## 2023-08-02 PROCEDURE — 77067 SCR MAMMO BI INCL CAD: CPT | Mod: 26,HCNC,, | Performed by: RADIOLOGY

## 2023-08-02 PROCEDURE — 77063 BREAST TOMOSYNTHESIS BI: CPT | Mod: 26,HCNC,, | Performed by: RADIOLOGY

## 2023-08-02 PROCEDURE — 77063 MAMMO DIGITAL SCREENING BILAT WITH TOMO: ICD-10-PCS | Mod: 26,HCNC,, | Performed by: RADIOLOGY

## 2023-08-02 PROCEDURE — 77067 MAMMO DIGITAL SCREENING BILAT WITH TOMO: ICD-10-PCS | Mod: 26,HCNC,, | Performed by: RADIOLOGY

## 2023-08-02 PROCEDURE — 77067 SCR MAMMO BI INCL CAD: CPT | Mod: TC,HCNC

## 2023-08-04 RX ORDER — METFORMIN HYDROCHLORIDE 500 MG/1
TABLET ORAL
Qty: 180 TABLET | Refills: 1 | Status: SHIPPED | OUTPATIENT
Start: 2023-08-04 | End: 2023-10-16 | Stop reason: SDUPTHER

## 2023-08-04 NOTE — TELEPHONE ENCOUNTER
No care due was identified.  [facility] Embedded Care Due Messages. Reference number: [id number].   8/04/2023 12:33:42 PM CDT

## 2023-08-05 NOTE — TELEPHONE ENCOUNTER
Refill Decision Note   Patsy Perez  is requesting a refill authorization.  Brief Assessment and Rationale for Refill:  Approve     Medication Therapy Plan:         Comments:     Note composed:7:41 PM 08/04/2023

## 2023-08-16 DIAGNOSIS — I10 HYPERTENSION, UNSPECIFIED TYPE: Primary | ICD-10-CM

## 2023-08-16 RX ORDER — LISINOPRIL 30 MG/1
30 TABLET ORAL DAILY
Qty: 90 TABLET | Refills: 3 | Status: SHIPPED | OUTPATIENT
Start: 2023-08-16 | End: 2023-08-16 | Stop reason: SDUPTHER

## 2023-08-16 RX ORDER — LISINOPRIL 30 MG/1
30 TABLET ORAL DAILY
Qty: 90 TABLET | Refills: 3 | Status: SHIPPED | OUTPATIENT
Start: 2023-08-16

## 2023-08-16 RX ORDER — LISINOPRIL 30 MG/1
30 TABLET ORAL
Qty: 90 TABLET | Refills: 3 | OUTPATIENT
Start: 2023-08-16 | End: 2023-08-16 | Stop reason: SDUPTHER

## 2023-08-16 RX ORDER — LISINOPRIL 30 MG/1
30 TABLET ORAL DAILY
Qty: 90 TABLET | Refills: 3 | OUTPATIENT
Start: 2023-08-16 | End: 2023-08-16 | Stop reason: SDUPTHER

## 2023-08-16 NOTE — TELEPHONE ENCOUNTER
Called patient back and she was wondering if she still needed to take Lisinopril 30mg. Advised her per last note that she should be taking it. It looks like it was not refilled at the [facility]. Please send in again.

## 2023-08-16 NOTE — TELEPHONE ENCOUNTER
----- Message from Shona Gamble sent at 8/16/2023  2:02 PM CDT -----  Contact: Pt [phone]  Consult    Please call the patient to let her know if she should still take the Lisinopril    Thank you

## 2023-08-16 NOTE — TELEPHONE ENCOUNTER
" Refill Decision Note   Patsy Perez  is requesting a refill authorization.  Brief Assessment and Rationale for Refill:  Approve     Medication Therapy Plan:         Comments: Previous order Dc'd due to order set to "Print"    Note composed:1:45 PM 08/16/2023          "

## 2023-08-16 NOTE — TELEPHONE ENCOUNTER
No care due was identified.  Health [facility] Embedded Care Due Messages. Reference number: [id number].   8/16/2023 1:16:15 PM CDT

## 2023-08-27 NOTE — TELEPHONE ENCOUNTER
No care due was identified.  Health [facility] Embedded Care Due Messages. Reference number: [id number].   8/27/2023 6:07:09 AM CDT

## 2023-08-28 RX ORDER — LEVOTHYROXINE SODIUM 75 UG/1
TABLET ORAL
Qty: 90 TABLET | Refills: 3 | Status: SHIPPED | OUTPATIENT
Start: 2023-08-28

## 2023-08-28 NOTE — TELEPHONE ENCOUNTER
Refill Decision Note   Patsy Perez  is requesting a refill authorization.  Brief Assessment and Rationale for Refill:  Approve     Medication Therapy Plan:         Comments:     Note composed:9:21 AM 08/28/2023

## 2023-10-13 ENCOUNTER — LAB VISIT (OUTPATIENT)
Dept: LAB | Facility: HOSPITAL | Age: 78
End: 2023-10-13
Attending: FAMILY MEDICINE
Payer: MEDICARE

## 2023-10-13 DIAGNOSIS — E11.8 CONTROLLED TYPE 2 DIABETES MELLITUS WITH COMPLICATION, WITHOUT LONG-TERM CURRENT USE OF INSULIN: ICD-10-CM

## 2023-10-13 DIAGNOSIS — I10 HYPERTENSION, UNSPECIFIED TYPE: ICD-10-CM

## 2023-10-13 LAB
ALBUMIN SERPL BCP-MCNC: 3.9 G/DL (ref 3.5–5.2)
ALP SERPL-CCNC: 71 U/L (ref 55–135)
ALT SERPL W/O P-5'-P-CCNC: 25 U/L (ref 10–44)
ANION GAP SERPL CALC-SCNC: 14 MMOL/L (ref 8–16)
AST SERPL-CCNC: 23 U/L (ref 10–40)
BILIRUB SERPL-MCNC: 0.5 MG/DL (ref 0.1–1)
BUN SERPL-MCNC: 12 MG/DL (ref 8–23)
CALCIUM SERPL-MCNC: 9.4 MG/DL (ref 8.7–10.5)
CHLORIDE SERPL-SCNC: 104 MMOL/L (ref 95–110)
CO2 SERPL-SCNC: 18 MMOL/L (ref 23–29)
CREAT SERPL-MCNC: 1.1 MG/DL (ref 0.5–1.4)
EST. GFR  (NO RACE VARIABLE): 52.4 ML/MIN/1.73 M^2
ESTIMATED AVG GLUCOSE: 151 MG/DL (ref 68–131)
GLUCOSE SERPL-MCNC: 149 MG/DL (ref 70–110)
HBA1C MFR BLD: 6.9 % (ref 4–5.6)
POTASSIUM SERPL-SCNC: 4.1 MMOL/L (ref 3.5–5.1)
PROT SERPL-MCNC: 7.6 G/DL (ref 6–8.4)
SODIUM SERPL-SCNC: 136 MMOL/L (ref 136–145)

## 2023-10-13 PROCEDURE — 83036 HEMOGLOBIN GLYCOSYLATED A1C: CPT | Mod: HCNC | Performed by: FAMILY MEDICINE

## 2023-10-13 PROCEDURE — 36415 COLL VENOUS BLD VENIPUNCTURE: CPT | Mod: HCNC | Performed by: FAMILY MEDICINE

## 2023-10-13 PROCEDURE — 80053 COMPREHEN METABOLIC PANEL: CPT | Mod: HCNC | Performed by: FAMILY MEDICINE

## 2023-10-16 ENCOUNTER — OFFICE VISIT (OUTPATIENT)
Dept: INTERNAL MEDICINE | Facility: CLINIC | Age: 78
End: 2023-10-16
Payer: MEDICARE

## 2023-10-16 VITALS
WEIGHT: 168.19 LBS | SYSTOLIC BLOOD PRESSURE: 130 MMHG | BODY MASS INDEX: 28.71 KG/M2 | HEIGHT: 64 IN | HEART RATE: 79 BPM | TEMPERATURE: 96 F | DIASTOLIC BLOOD PRESSURE: 70 MMHG | OXYGEN SATURATION: 97 %

## 2023-10-16 DIAGNOSIS — K80.20 GALLSTONES: ICD-10-CM

## 2023-10-16 DIAGNOSIS — Z79.899 ENCOUNTER FOR LONG-TERM (CURRENT) USE OF MEDICATIONS: ICD-10-CM

## 2023-10-16 DIAGNOSIS — E03.9 HYPOTHYROIDISM, UNSPECIFIED TYPE: ICD-10-CM

## 2023-10-16 DIAGNOSIS — N18.31 STAGE 3A CHRONIC KIDNEY DISEASE: ICD-10-CM

## 2023-10-16 DIAGNOSIS — Z86.69 H/O SCIATICA: Primary | ICD-10-CM

## 2023-10-16 DIAGNOSIS — I10 HYPERTENSION, UNSPECIFIED TYPE: ICD-10-CM

## 2023-10-16 DIAGNOSIS — J30.9 ALLERGIC RHINITIS, UNSPECIFIED SEASONALITY, UNSPECIFIED TRIGGER: ICD-10-CM

## 2023-10-16 DIAGNOSIS — K76.0 FATTY LIVER: ICD-10-CM

## 2023-10-16 DIAGNOSIS — E78.5 HYPERLIPIDEMIA, UNSPECIFIED HYPERLIPIDEMIA TYPE: ICD-10-CM

## 2023-10-16 DIAGNOSIS — E11.8 CONTROLLED TYPE 2 DIABETES MELLITUS WITH COMPLICATION, WITHOUT LONG-TERM CURRENT USE OF INSULIN: ICD-10-CM

## 2023-10-16 PROCEDURE — 99214 PR OFFICE/OUTPT VISIT, EST, LEVL IV, 30-39 MIN: ICD-10-PCS | Mod: HCNC,S$GLB,, | Performed by: FAMILY MEDICINE

## 2023-10-16 PROCEDURE — 3288F PR FALLS RISK ASSESSMENT DOCUMENTED: ICD-10-PCS | Mod: HCNC,CPTII,S$GLB, | Performed by: FAMILY MEDICINE

## 2023-10-16 PROCEDURE — 3044F PR MOST RECENT HEMOGLOBIN A1C LEVEL <7.0%: ICD-10-PCS | Mod: HCNC,CPTII,S$GLB, | Performed by: FAMILY MEDICINE

## 2023-10-16 PROCEDURE — 1159F PR MEDICATION LIST DOCUMENTED IN MEDICAL RECORD: ICD-10-PCS | Mod: HCNC,CPTII,S$GLB, | Performed by: FAMILY MEDICINE

## 2023-10-16 PROCEDURE — 1101F PR PT FALLS ASSESS DOC 0-1 FALLS W/OUT INJ PAST YR: ICD-10-PCS | Mod: HCNC,CPTII,S$GLB, | Performed by: FAMILY MEDICINE

## 2023-10-16 PROCEDURE — 3075F PR MOST RECENT SYSTOLIC BLOOD PRESS GE 130-139MM HG: ICD-10-PCS | Mod: HCNC,CPTII,S$GLB, | Performed by: FAMILY MEDICINE

## 2023-10-16 PROCEDURE — 99999 PR PBB SHADOW E&M-EST. PATIENT-LVL III: ICD-10-PCS | Mod: PBBFAC,HCNC,, | Performed by: FAMILY MEDICINE

## 2023-10-16 PROCEDURE — 3075F SYST BP GE 130 - 139MM HG: CPT | Mod: HCNC,CPTII,S$GLB, | Performed by: FAMILY MEDICINE

## 2023-10-16 PROCEDURE — 3044F HG A1C LEVEL LT 7.0%: CPT | Mod: HCNC,CPTII,S$GLB, | Performed by: FAMILY MEDICINE

## 2023-10-16 PROCEDURE — 3078F DIAST BP <80 MM HG: CPT | Mod: HCNC,CPTII,S$GLB, | Performed by: FAMILY MEDICINE

## 2023-10-16 PROCEDURE — 1159F MED LIST DOCD IN RCRD: CPT | Mod: HCNC,CPTII,S$GLB, | Performed by: FAMILY MEDICINE

## 2023-10-16 PROCEDURE — 2023F DILAT RTA XM W/O RTNOPTHY: CPT | Mod: HCNC,CPTII,S$GLB, | Performed by: FAMILY MEDICINE

## 2023-10-16 PROCEDURE — 1101F PT FALLS ASSESS-DOCD LE1/YR: CPT | Mod: HCNC,CPTII,S$GLB, | Performed by: FAMILY MEDICINE

## 2023-10-16 PROCEDURE — 99999 PR PBB SHADOW E&M-EST. PATIENT-LVL III: CPT | Mod: PBBFAC,HCNC,, | Performed by: FAMILY MEDICINE

## 2023-10-16 PROCEDURE — 3078F PR MOST RECENT DIASTOLIC BLOOD PRESSURE < 80 MM HG: ICD-10-PCS | Mod: HCNC,CPTII,S$GLB, | Performed by: FAMILY MEDICINE

## 2023-10-16 PROCEDURE — 99214 OFFICE O/P EST MOD 30 MIN: CPT | Mod: HCNC,S$GLB,, | Performed by: FAMILY MEDICINE

## 2023-10-16 PROCEDURE — 2023F PR DILATED RETINAL EXAM W/O EVID OF RETINOPATHY: ICD-10-PCS | Mod: HCNC,CPTII,S$GLB, | Performed by: FAMILY MEDICINE

## 2023-10-16 PROCEDURE — 3288F FALL RISK ASSESSMENT DOCD: CPT | Mod: HCNC,CPTII,S$GLB, | Performed by: FAMILY MEDICINE

## 2023-10-16 RX ORDER — INFLUENZA A VIRUS A/VICTORIA/4897/2022 IVR-238 (H1N1) ANTIGEN (FORMALDEHYDE INACTIVATED), INFLUENZA A VIRUS A/DARWIN/9/2021 SAN-010 (H3N2) ANTIGEN (FORMALDEHYDE INACTIVATED), INFLUENZA B VIRUS B/PHUKET/3073/2013 ANTIGEN (FORMALDEHYDE INACTIVATED), AND INFLUENZA B VIRUS B/MICHIGAN/01/2021 ANTIGEN (FORMALDEHYDE INACTIVATED) 60; 60; 60; 60 UG/.7ML; UG/.7ML; UG/.7ML; UG/.7ML
INJECTION, SUSPENSION INTRAMUSCULAR
COMMUNITY
Start: 2023-08-28 | End: 2023-10-16

## 2023-10-16 RX ORDER — PRAVASTATIN SODIUM 80 MG/1
80 TABLET ORAL DAILY
Qty: 90 TABLET | Refills: 3 | Status: SHIPPED | OUTPATIENT
Start: 2023-10-16

## 2023-10-16 RX ORDER — AMLODIPINE BESYLATE 10 MG/1
10 TABLET ORAL DAILY
Qty: 90 TABLET | Refills: 3 | Status: SHIPPED | OUTPATIENT
Start: 2023-10-16

## 2023-10-16 RX ORDER — METFORMIN HYDROCHLORIDE 500 MG/1
500 TABLET ORAL 2 TIMES DAILY WITH MEALS
Qty: 180 TABLET | Refills: 1 | Status: SHIPPED | OUTPATIENT
Start: 2023-10-16

## 2023-10-16 RX ORDER — ATENOLOL 50 MG/1
50 TABLET ORAL DAILY
Qty: 90 TABLET | Refills: 3 | Status: SHIPPED | OUTPATIENT
Start: 2023-10-16 | End: 2024-10-15

## 2023-10-16 NOTE — PROGRESS NOTES
Subjective:       Patient ID: Patsy Perez is a 77 y.o. female.    Chief Complaint: Follow-up (DM)    HPI    Patient Active Problem List   Diagnosis    Hypothyroidism    Hypertension    HLD (hyperlipidemia)    Gallstones    Fatty liver    DM II (diabetes mellitus, type II), controlled    CKD (chronic kidney disease)    Piriformis syndrome of left side    Weakness    Posture imbalance    H/O sciatica    Diarrhea    Allergic rhinitis       Past Medical History:   Diagnosis Date    CKD (chronic kidney disease)     DM II (diabetes mellitus, type II), controlled     Fatty liver     Gallstones     found on US    HLD (hyperlipidemia)     Hypertension     Hypothyroidism     s/p thyroidectomy       Past Surgical History:   Procedure Laterality Date    APPENDECTOMY      1985    CATARACT EXTRACTION      HYSTERECTOMY      2009 - Dr. Thorne [facility]    LAPAROSCOPIC CHOLECYSTECTOMY N/A 09/21/2022    Procedure: CHOLECYSTECTOMY, LAPAROSCOPIC;  Surgeon: Roslyn Mckeon DO;  Location: [facility] OR;  Service: General;  Laterality: N/A;    OOPHORECTOMY      THYROIDECTOMY      1985       Family History   Problem Relation Age of Onset    Cancer Mother     Diabetes Mother     Breast cancer Mother         late 70s    Osteoarthritis Mother         double knee replacement    Alzheimer's disease Mother     Heart disease Father     Alzheimer's disease Maternal Aunt     Alzheimer's disease Maternal Uncle     Alzheimer's disease Maternal Grandmother        Social History     Tobacco Use   Smoking Status Former    Current packs/day: 0.75    Average packs/day: 0.8 packs/day for 25.0 years (18.8 ttl pk-yrs)    Types: Cigarettes   Smokeless Tobacco Never   Tobacco Comments    Quit 1990s       Wt Readings from Last 5 Encounters:   10/16/23 76.3 kg (168 lb 3.4 oz)   07/24/23 78.9 kg (174 lb 0.9 oz)   06/15/23 79.5 kg (175 lb 4.3 oz)   03/15/23 80.5 kg (177 lb 7.5 oz)   12/08/22 78 kg (172 lb 1.1 oz)       For further HPI details, see  assessment and plan.    Review of Systems   Constitutional:  Negative for chills and fever.   HENT:  Negative for congestion, sinus pressure and voice change.    Eyes:  Negative for visual disturbance.   Respiratory:  Negative for shortness of breath.    Cardiovascular:  Negative for chest pain.   Gastrointestinal:  Negative for constipation and diarrhea.   Genitourinary:  Negative for difficulty urinating.   Musculoskeletal:  Negative for gait problem.   Skin:  Negative for rash.   Neurological:  Negative for dizziness and light-headedness.   Psychiatric/Behavioral:  Negative for dysphoric mood.        Objective:      Vitals:    10/16/23 0706   BP: 130/70   Pulse: 79   Temp: 96 °F (35.6 °C)       Physical Exam  Constitutional:       General: She is not in acute distress.     Appearance: She is not ill-appearing.   Pulmonary:      Effort: Pulmonary effort is normal. No respiratory distress.   Neurological:      General: No focal deficit present.      Mental Status: She is alert.   Psychiatric:         Mood and Affect: Mood normal.         Behavior: Behavior normal.         Assessment:       1. H/O sciatica    2. Allergic rhinitis, unspecified seasonality, unspecified trigger    3. Hypertension, unspecified type    4. Hyperlipidemia, unspecified hyperlipidemia type    5. Hypothyroidism, unspecified type    6. Controlled type 2 diabetes mellitus with complication, without long-term current use of insulin    7. Fatty liver    8. Stage 3a chronic kidney disease    9. Encounter for long-term (current) use of medications        Plan:   H/O sciatica  Has not been a problem.  As long she is exercising it seems controlled.  She and her  do a good job going to the gym and doing both cardiovascular and resistance training.      Allergic rhinitis, unspecified seasonality, unspecified trigger  Flonase works great. Doing well.    Hypertension, unspecified type  BP Readings from Last 3 Encounters:   10/16/23 130/70    07/24/23 139/62   06/15/23 138/71   Amlodipine 10   Atenolol 50   Lisinopril 30 discussed angioedema.  She is aware to monitor never occurs to discontinue the medication      Hyperlipidemia, unspecified hyperlipidemia type  Lab Results   Component Value Date    LDLCALC 53.0 (L) 03/15/2023   Pravastatin 80 mg.  Continue medication    Hypothyroidism, unspecified type  Lab Results   Component Value Date    TSH 0.925 06/15/2023   Continue Synthroid.  75 mcg    Controlled type 2 diabetes mellitus with complication, without long-term current use of insulin  Lab Results   Component Value Date    HGBA1C 6.9 (H) 10/13/2023   Jardiance 25 mg. - no urinary issues.   Metformin 500 mg b.i.d.  Controlled  Continue meds  Tolerating.      Fatty liver  Lab Results   Component Value Date    ALT 25 10/13/2023    AST 23 10/13/2023    ALKPHOS 71 10/13/2023    BILITOT 0.5 10/13/2023   Body mass index 28.87.  Discussed overweight BMI status.  She has lost some weight.  Continue good efforts.      CKD IIIa  Lab Results   Component Value Date    CREATININE 1.1 10/13/2023   No abrupt changes.  Avoid nonsteroidal anti-inflammatory drugs.  To monitor given her medication list    Chronic Diarrhea   Patient find significant benefit with Imodium. Depends on what she eats - ongoing since time of gallbladder removal.    Med list reviewed  100% accurate  Refills provided.  Declines covid shot    6 month  follow up  Labs ordered today - I'd like to do a few days prior to next visit.    This note was verbally dictated, please excuse any type errors.

## 2024-01-12 ENCOUNTER — OFFICE VISIT (OUTPATIENT)
Dept: INTERNAL MEDICINE | Facility: CLINIC | Age: 79
End: 2024-01-12
Payer: MEDICARE

## 2024-01-12 ENCOUNTER — LAB VISIT (OUTPATIENT)
Dept: LAB | Facility: HOSPITAL | Age: 79
End: 2024-01-12
Attending: PHYSICIAN ASSISTANT
Payer: MEDICARE

## 2024-01-12 DIAGNOSIS — N89.8 VAGINAL ITCHING: Primary | ICD-10-CM

## 2024-01-12 DIAGNOSIS — N89.8 VAGINAL ITCHING: ICD-10-CM

## 2024-01-12 LAB
BACTERIA #/AREA URNS HPF: NORMAL /HPF
BILIRUB UR QL STRIP: NEGATIVE
CLARITY UR: CLEAR
COLOR UR: ABNORMAL
GLUCOSE UR QL STRIP: ABNORMAL
HGB UR QL STRIP: NEGATIVE
KETONES UR QL STRIP: NEGATIVE
LEUKOCYTE ESTERASE UR QL STRIP: NEGATIVE
MICROSCOPIC COMMENT: NORMAL
NITRITE UR QL STRIP: NEGATIVE
PH UR STRIP: 6 [PH] (ref 5–8)
PROT UR QL STRIP: NEGATIVE
SP GR UR STRIP: <1.005 (ref 1–1.03)
SQUAMOUS #/AREA URNS HPF: 3 /HPF
URN SPEC COLLECT METH UR: ABNORMAL
UROBILINOGEN UR STRIP-ACNC: NEGATIVE EU/DL
WBC #/AREA URNS HPF: 2 /HPF (ref 0–5)
YEAST URNS QL MICRO: NORMAL

## 2024-01-12 PROCEDURE — 99213 OFFICE O/P EST LOW 20 MIN: CPT | Mod: HCNC,S$GLB,, | Performed by: PHYSICIAN ASSISTANT

## 2024-01-12 PROCEDURE — 3075F SYST BP GE 130 - 139MM HG: CPT | Mod: HCNC,CPTII,S$GLB, | Performed by: PHYSICIAN ASSISTANT

## 2024-01-12 PROCEDURE — 1126F AMNT PAIN NOTED NONE PRSNT: CPT | Mod: HCNC,CPTII,S$GLB, | Performed by: PHYSICIAN ASSISTANT

## 2024-01-12 PROCEDURE — 1160F RVW MEDS BY RX/DR IN RCRD: CPT | Mod: HCNC,CPTII,S$GLB, | Performed by: PHYSICIAN ASSISTANT

## 2024-01-12 PROCEDURE — 1159F MED LIST DOCD IN RCRD: CPT | Mod: HCNC,CPTII,S$GLB, | Performed by: PHYSICIAN ASSISTANT

## 2024-01-12 PROCEDURE — 81000 URINALYSIS NONAUTO W/SCOPE: CPT | Mod: HCNC | Performed by: PHYSICIAN ASSISTANT

## 2024-01-12 PROCEDURE — 3288F FALL RISK ASSESSMENT DOCD: CPT | Mod: HCNC,CPTII,S$GLB, | Performed by: PHYSICIAN ASSISTANT

## 2024-01-12 PROCEDURE — 99999 PR PBB SHADOW E&M-EST. PATIENT-LVL IV: CPT | Mod: PBBFAC,HCNC,, | Performed by: PHYSICIAN ASSISTANT

## 2024-01-12 PROCEDURE — 1101F PT FALLS ASSESS-DOCD LE1/YR: CPT | Mod: HCNC,CPTII,S$GLB, | Performed by: PHYSICIAN ASSISTANT

## 2024-01-12 PROCEDURE — 3078F DIAST BP <80 MM HG: CPT | Mod: HCNC,CPTII,S$GLB, | Performed by: PHYSICIAN ASSISTANT

## 2024-01-12 RX ORDER — FLUCONAZOLE 150 MG/1
TABLET ORAL
Qty: 2 TABLET | Refills: 0 | Status: SHIPPED | OUTPATIENT
Start: 2024-01-12 | End: 2024-03-25 | Stop reason: SDUPTHER

## 2024-01-12 RX ORDER — CLOTRIMAZOLE 1 %
CREAM (GRAM) TOPICAL 2 TIMES DAILY
Qty: 113 G | Refills: 0 | Status: SHIPPED | OUTPATIENT
Start: 2024-01-12 | End: 2024-04-02

## 2024-01-12 NOTE — PROGRESS NOTES
Subjective:       Patient ID: Patsy Perez is a 78 y.o. female.    Chief Complaint: Vaginitis (Patient stated that she thinks she has a yeast infection. )      Diabetes  She has type 2 diabetes mellitus. No MedicAlert identification noted. The initial diagnosis of diabetes was made 20 years ago. Pertinent negatives for hypoglycemia include no confusion, dizziness, headaches, hunger, mood changes, nervousness/anxiousness, pallor, seizures, sleepiness, speech difficulty, sweats or tremors. Pertinent negatives for diabetes include no blurred vision, no chest pain, no fatigue, no foot paresthesias, no foot ulcerations, no polydipsia, no polyphagia, no polyuria, no visual change, no weakness and no weight loss. Pertinent negatives for hypoglycemia complications include no blackouts, no hospitalization, no nocturnal hypoglycemia, no required assistance and no required glucagon injection. Diabetic complications include nephropathy. Pertinent negatives for diabetic complications include no autonomic neuropathy, CVA, heart disease, peripheral neuropathy, PVD or retinopathy. Risk factors for coronary artery disease include hypertension. Current diabetic treatment includes oral agent (dual therapy). She is compliant with treatment all of the time. She is currently taking insulin pre-breakfast. Her weight is stable. She is following a diabetic diet. When asked about meal planning, she reported none. She has not had a previous visit with a dietitian. She participates in exercise three times a week. She monitors blood glucose at home 1-2 x per day. There is no change in her home blood glucose trend. She does not see a podiatrist.Eye exam is current.     77 y/o female with HTN, HLD, DMII and CKD presents to clinic with c/o vaginal itching, stinging and burning that started a few days ago. She tried OTC Monistat kit without relief. She denies urinary symptoms.     Review of Systems   Constitutional:  Negative for fatigue and  weight loss.   Eyes:  Negative for blurred vision.   Cardiovascular:  Negative for chest pain.   Gastrointestinal:  Negative for abdominal pain.   Endocrine: Negative for polydipsia, polyphagia and polyuria.   Genitourinary:  Positive for vaginal pain. Negative for dysuria, frequency, hematuria, urgency, vaginal bleeding and vaginal discharge.   Skin:  Negative for pallor.   Neurological:  Negative for dizziness, tremors, seizures, speech difficulty, weakness and headaches.   Psychiatric/Behavioral:  Negative for confusion. The patient is not nervous/anxious.        Objective:      Physical Exam  Vitals and nursing note reviewed.   Constitutional:       General: She is not in acute distress.     Appearance: She is well-developed.   HENT:      Head: Normocephalic and atraumatic.   Eyes:      General: Lids are normal. No scleral icterus.     Extraocular Movements: Extraocular movements intact.      Conjunctiva/sclera: Conjunctivae normal.   Pulmonary:      Effort: Pulmonary effort is normal.   Neurological:      Mental Status: She is alert.      Cranial Nerves: No cranial nerve deficit.   Psychiatric:         Mood and Affect: Mood and affect normal.         Assessment:       1. Vaginal itching        Plan:   1. Vaginal itching  -     Urinalysis, Reflex to Urine Culture Urine, Clean Catch; Future; Expected date: 01/12/2024  -     fluconazole (DIFLUCAN) 150 MG Tab; Take 1 tab PO now and again in 3 days  Dispense: 2 tablet; Refill: 0  -     clotrimazole (LOTRIMIN) 1 % cream; Apply topically 2 (two) times daily.  Dispense: 113 g; Refill: 0

## 2024-01-16 VITALS
WEIGHT: 164.13 LBS | HEART RATE: 82 BPM | TEMPERATURE: 98 F | OXYGEN SATURATION: 95 % | BODY MASS INDEX: 28.17 KG/M2 | SYSTOLIC BLOOD PRESSURE: 136 MMHG | DIASTOLIC BLOOD PRESSURE: 71 MMHG

## 2024-03-18 LAB
LEFT EYE DM RETINOPATHY: POSITIVE
RIGHT EYE DM RETINOPATHY: POSITIVE

## 2024-03-25 DIAGNOSIS — N89.8 VAGINAL ITCHING: ICD-10-CM

## 2024-03-25 RX ORDER — FLUCONAZOLE 150 MG/1
TABLET ORAL
Qty: 2 TABLET | Refills: 0 | Status: SHIPPED | OUTPATIENT
Start: 2024-03-25 | End: 2024-05-09

## 2024-03-27 ENCOUNTER — PATIENT OUTREACH (OUTPATIENT)
Dept: ADMINISTRATIVE | Facility: HOSPITAL | Age: 79
End: 2024-03-27
Payer: MEDICARE

## 2024-04-02 ENCOUNTER — OFFICE VISIT (OUTPATIENT)
Dept: INTERNAL MEDICINE | Facility: CLINIC | Age: 79
End: 2024-04-02
Payer: MEDICARE

## 2024-04-02 ENCOUNTER — LAB VISIT (OUTPATIENT)
Dept: LAB | Facility: HOSPITAL | Age: 79
End: 2024-04-02
Attending: PHYSICIAN ASSISTANT
Payer: MEDICARE

## 2024-04-02 VITALS
WEIGHT: 162.81 LBS | OXYGEN SATURATION: 97 % | BODY MASS INDEX: 27.95 KG/M2 | SYSTOLIC BLOOD PRESSURE: 135 MMHG | HEART RATE: 86 BPM | DIASTOLIC BLOOD PRESSURE: 58 MMHG

## 2024-04-02 DIAGNOSIS — E11.65 TYPE 2 DIABETES MELLITUS WITH HYPERGLYCEMIA, WITHOUT LONG-TERM CURRENT USE OF INSULIN: ICD-10-CM

## 2024-04-02 DIAGNOSIS — N89.8 VAGINAL IRRITATION: ICD-10-CM

## 2024-04-02 DIAGNOSIS — R39.9 URINARY SYMPTOM OR SIGN: Primary | ICD-10-CM

## 2024-04-02 DIAGNOSIS — R39.9 URINARY SYMPTOM OR SIGN: ICD-10-CM

## 2024-04-02 LAB
BACTERIA #/AREA URNS HPF: NORMAL /HPF
BILIRUB UR QL STRIP: NEGATIVE
CLARITY UR: CLEAR
COLOR UR: YELLOW
GLUCOSE UR QL STRIP: ABNORMAL
HGB UR QL STRIP: NEGATIVE
KETONES UR QL STRIP: NEGATIVE
LEUKOCYTE ESTERASE UR QL STRIP: NEGATIVE
MICROSCOPIC COMMENT: NORMAL
NITRITE UR QL STRIP: NEGATIVE
PH UR STRIP: 7 [PH] (ref 5–8)
PROT UR QL STRIP: NEGATIVE
RBC #/AREA URNS HPF: 1 /HPF (ref 0–4)
SP GR UR STRIP: <1.005 (ref 1–1.03)
SQUAMOUS #/AREA URNS HPF: 4 /HPF
URN SPEC COLLECT METH UR: ABNORMAL
WBC #/AREA URNS HPF: 4 /HPF (ref 0–5)
WBC CLUMPS URNS QL MICRO: NORMAL
YEAST URNS QL MICRO: NORMAL

## 2024-04-02 PROCEDURE — 3078F DIAST BP <80 MM HG: CPT | Mod: HCNC,CPTII,S$GLB, | Performed by: PHYSICIAN ASSISTANT

## 2024-04-02 PROCEDURE — 3075F SYST BP GE 130 - 139MM HG: CPT | Mod: HCNC,CPTII,S$GLB, | Performed by: PHYSICIAN ASSISTANT

## 2024-04-02 PROCEDURE — 1101F PT FALLS ASSESS-DOCD LE1/YR: CPT | Mod: HCNC,CPTII,S$GLB, | Performed by: PHYSICIAN ASSISTANT

## 2024-04-02 PROCEDURE — 99214 OFFICE O/P EST MOD 30 MIN: CPT | Mod: HCNC,S$GLB,, | Performed by: PHYSICIAN ASSISTANT

## 2024-04-02 PROCEDURE — 3288F FALL RISK ASSESSMENT DOCD: CPT | Mod: HCNC,CPTII,S$GLB, | Performed by: PHYSICIAN ASSISTANT

## 2024-04-02 PROCEDURE — 81000 URINALYSIS NONAUTO W/SCOPE: CPT | Mod: HCNC | Performed by: PHYSICIAN ASSISTANT

## 2024-04-02 PROCEDURE — 1160F RVW MEDS BY RX/DR IN RCRD: CPT | Mod: HCNC,CPTII,S$GLB, | Performed by: PHYSICIAN ASSISTANT

## 2024-04-02 PROCEDURE — 1126F AMNT PAIN NOTED NONE PRSNT: CPT | Mod: HCNC,CPTII,S$GLB, | Performed by: PHYSICIAN ASSISTANT

## 2024-04-02 PROCEDURE — 99999 PR PBB SHADOW E&M-EST. PATIENT-LVL IV: CPT | Mod: PBBFAC,HCNC,, | Performed by: PHYSICIAN ASSISTANT

## 2024-04-02 PROCEDURE — 1159F MED LIST DOCD IN RCRD: CPT | Mod: HCNC,CPTII,S$GLB, | Performed by: PHYSICIAN ASSISTANT

## 2024-04-02 RX ORDER — GLIMEPIRIDE 1 MG/1
1 TABLET ORAL
Qty: 90 TABLET | Refills: 3 | Status: SHIPPED | OUTPATIENT
Start: 2024-04-02 | End: 2025-04-02

## 2024-04-02 RX ORDER — CLOTRIMAZOLE 1 %
CREAM (GRAM) TOPICAL 2 TIMES DAILY
Qty: 113 G | Refills: 0 | Status: SHIPPED | OUTPATIENT
Start: 2024-04-02 | End: 2024-05-09

## 2024-04-04 NOTE — PROGRESS NOTES
Date: 3/25/2024  Time: 1730     Data:  Pre Wt:   73.5 kg (standing)  Desired Wt:   2-3 kg  Post Wt:  -2.2 kg   Weight change: - 2.2 kg  Ultrafiltration - Post Run Net Total Removed (mL):  2230 ml  Vascular Access Status: Fistula patent  Dialyzer Rinse:  Light  Total Blood Volume Processed: 98.6 L   Total Dialysis (Treatment) Time:   4 Hrs  Dialysate Bath: K 3, Ca 3  Heparin: Heparin: None     Lab:   No  HbsAg - 3/15/24 (non reactive)  HbsAb - 3/15/24 immune       Interventions:  Dialysis done through lt arm AV Fistula using 15 gauge needles  UF set to 3 Liters, accommodating priming and rinse back volumes  ,   BP so labile, hold off UF until resolved. Pt had 2 BM, cleaned and kept pt in comfortable positioning.   Glucose checks done. Inta-dialysis: 192 mg/dl = 3 units; Carb count: 120 g (1 unit: 10 g carb) = 12 units insulin. Total of 15 units insulin given, subcutaneous.   Treatment has ended safely and  blood is rinsed back completely  Decannulation done post HD, hemostasis is achieved in 10 minutes  Pressure dressing is applied, to be removed after 4-6 hours  Report given to PCN, sent back to [address] [place] [address] room in stable condition     Assessment:  A/O x 4, calm and cooperative, denies pain  lt arm AV Fistula has good thrill and bruit                Plan:    Per Renal team        HAL KOROMA, BARBARAN, RN  Acute Dialysis RN  [facility]    Subjective:       Patient ID: Patsy Perez is a 78 y.o. female.    Chief Complaint: Vaginitis (Patient stated that she thinks she has a yeast infection or a UTI.)      Diabetes  She has type 2 diabetes mellitus. No MedicAlert identification noted. The initial diagnosis of diabetes was made 20 years ago. Pertinent negatives for hypoglycemia include no confusion, dizziness, headaches, hunger, mood changes, nervousness/anxiousness, pallor, seizures, sleepiness, speech difficulty, sweats or tremors. Pertinent negatives for diabetes include no blurred vision, no chest pain, no fatigue, no foot paresthesias, no foot ulcerations, no polydipsia, no polyphagia, no polyuria, no visual change, no weakness and no weight loss. Pertinent negatives for hypoglycemia complications include no blackouts, no hospitalization, no nocturnal hypoglycemia, no required assistance and no required glucagon injection. Symptoms are stable. Diabetic complications include nephropathy. Pertinent negatives for diabetic complications include no autonomic neuropathy, CVA, heart disease, peripheral neuropathy, PVD or retinopathy. Risk factors for coronary artery disease include dyslipidemia, family history, hypertension and obesity. Current diabetic treatment includes oral agent (dual therapy). She is compliant with treatment all of the time. Her weight is decreasing steadily. She is following a diabetic diet. Meal planning includes avoidance of concentrated sweets. She has not had a previous visit with a dietitian. She participates in exercise three times a week. She monitors blood glucose at home 1-2 x per day. Blood glucose monitoring compliance is excellent. There is no change in her home blood glucose trend. She does not see a podiatrist.Eye exam is current.     78-year-old female presents with complaints of vaginal itching and irritation and dysuria.  She denies hematuria or urine odor, no urgency or frequency.  No lower abdominal pain, fever,  chills, nausea or vomiting.  No flank pain.  She reports this is the 2nd time this has happened since she has been on the Januvia recently.  She is requesting to go back on the glimepiride and stop Januvia.      Review of Systems   Constitutional:  Negative for fatigue and weight loss.   Eyes:  Negative for blurred vision.   Cardiovascular:  Negative for chest pain.   Endocrine: Negative for polydipsia, polyphagia and polyuria.   Genitourinary:  Positive for dysuria and vaginal pain. Negative for flank pain, frequency, genital sores, hematuria, urgency, vaginal bleeding and vaginal discharge.   Skin:  Negative for pallor.   Neurological:  Negative for dizziness, tremors, seizures, speech difficulty, weakness and headaches.   Psychiatric/Behavioral:  Negative for confusion. The patient is not nervous/anxious.        Objective:      Physical Exam  Vitals and nursing note reviewed.   Constitutional:       General: She is not in acute distress.     Appearance: She is well-developed.   HENT:      Head: Normocephalic and atraumatic.   Eyes:      General: Lids are normal. No scleral icterus.     Extraocular Movements: Extraocular movements intact.      Conjunctiva/sclera: Conjunctivae normal.   Cardiovascular:      Rate and Rhythm: Normal rate and regular rhythm.   Pulmonary:      Effort: Pulmonary effort is normal.      Breath sounds: Normal breath sounds. No decreased breath sounds, wheezing, rhonchi or rales.   Neurological:      Mental Status: She is alert.      Cranial Nerves: No cranial nerve deficit.   Psychiatric:         Mood and Affect: Mood and affect normal.         Assessment:       1. Urinary symptom or sign    2. Type 2 diabetes mellitus with hyperglycemia, without long-term current use of insulin    3. Vaginal irritation        Plan:   1. Urinary symptom or sign  -     Urinalysis, Reflex to Urine Culture Urine, Clean Catch; Future; Expected date: 04/02/2024    2. Type 2 diabetes mellitus with hyperglycemia,  without long-term current use of insulin  Assessment & Plan:  Patient is requesting to stop Januvia and restart glimepiride.  She continues to have urinary burning after starting Januvia.  Encouraged to continue monitoring glucose levels daily.  She was previously on have 2 mg glimepiride once daily so will prescribe 1 mg once daily.  Follow up with PCP for review of daily log and recheck A1c.  Monitor for signs of hypoglycemia.    Orders:  -     glimepiride (AMARYL) 1 MG tablet; Take 1 tablet (1 mg total) by mouth before breakfast.  Dispense: 90 tablet; Refill: 3    3. Vaginal irritation  -     clotrimazole (LOTRIMIN) 1 % cream; Apply topically 2 (two) times daily.  Dispense: 113 g; Refill: 0        Lab Results   Component Value Date    COLORU Yellow 04/02/2024    APPEARANCEUA Clear 04/02/2024    PHUR 7.0 04/02/2024    SPECGRAV <1.005 (A) 04/02/2024    PROTEINUA Negative 04/02/2024    GLUCUA 3+ (A) 04/02/2024    KETONESU Negative 04/02/2024    BILIRUBINUA Negative 04/02/2024    OCCULTUA Negative 04/02/2024    NITRITE Negative 04/02/2024    UROBILINOGEN Negative 01/12/2024    LEUKOCYTESUR Negative 04/02/2024

## 2024-04-04 NOTE — ASSESSMENT & PLAN NOTE
Patient is requesting to stop Januvia and restart glimepiride.  She continues to have urinary burning after starting Januvia.  Encouraged to continue monitoring glucose levels daily.  She was previously on have 2 mg glimepiride once daily so will prescribe 1 mg once daily.  Follow up with PCP for review of daily log and recheck A1c.  Monitor for signs of hypoglycemia.

## 2024-04-21 NOTE — TELEPHONE ENCOUNTER
Care Due:                  Date            Visit Type   Department     Provider  --------------------------------------------------------------------------------                                EP -                              PRIMARY      [facility] INTERNAL  Last Visit: 10-      CARE ([facility])   ANGELA Yepez                              EP -                              PRIMARY      [facility] INTERNAL  Next Visit: 05-      CARE ([facility])   ANGELA Yepez                                                            Last  Test          Frequency    Reason                     Performed    Due Date  --------------------------------------------------------------------------------    HBA1C.......  6 months...  metFORMIN................  10-   04-    Lipid Panel.  12 months..  pravastatin..............  03-   03-    [facility] Embedded Care Due Messages. Reference number: [id number].   4/21/2024 6:03:24 AM CDT

## 2024-04-22 RX ORDER — EMPAGLIFLOZIN 25 MG/1
25 TABLET, FILM COATED ORAL
Qty: 90 TABLET | Refills: 0 | OUTPATIENT
Start: 2024-04-22

## 2024-04-23 RX ORDER — METFORMIN HYDROCHLORIDE 500 MG/1
500 TABLET ORAL 2 TIMES DAILY WITH MEALS
Qty: 180 TABLET | Refills: 0 | Status: SHIPPED | OUTPATIENT
Start: 2024-04-23 | End: 2024-05-09 | Stop reason: SDUPTHER

## 2024-04-23 NOTE — TELEPHONE ENCOUNTER
Refill Routing Note   Medication(s) are not appropriate for processing by [facility] for the following reason(s):        Required labs outdated    [facility] action(s):  Defer  Quick Discontinue        Medication Therapy Plan: Yvonne Veliz (4/2/24); FLOS      Appointments  past 12m or future 3m with PCP    Date Provider   Last Visit   10/16/2023 Christ Yepez MD   Next Visit   5/9/2024 Christ Yepez MD   ED visits in past 90 days: 0        Note composed:8:29 PM 04/22/2024

## 2024-05-06 ENCOUNTER — LAB VISIT (OUTPATIENT)
Dept: LAB | Facility: HOSPITAL | Age: 79
End: 2024-05-06
Attending: FAMILY MEDICINE
Payer: MEDICARE

## 2024-05-06 DIAGNOSIS — Z79.899 ENCOUNTER FOR LONG-TERM (CURRENT) USE OF MEDICATIONS: ICD-10-CM

## 2024-05-06 LAB
ALBUMIN SERPL BCP-MCNC: 3.6 G/DL (ref 3.5–5.2)
ALP SERPL-CCNC: 60 U/L (ref 55–135)
ALT SERPL W/O P-5'-P-CCNC: 22 U/L (ref 10–44)
ANION GAP SERPL CALC-SCNC: 12 MMOL/L (ref 8–16)
AST SERPL-CCNC: 19 U/L (ref 10–40)
BASOPHILS # BLD AUTO: 0.07 K/UL (ref 0–0.2)
BASOPHILS NFR BLD: 0.7 % (ref 0–1.9)
BILIRUB SERPL-MCNC: 0.4 MG/DL (ref 0.1–1)
BUN SERPL-MCNC: 14 MG/DL (ref 8–23)
CALCIUM SERPL-MCNC: 9.1 MG/DL (ref 8.7–10.5)
CHLORIDE SERPL-SCNC: 104 MMOL/L (ref 95–110)
CHOLEST SERPL-MCNC: 134 MG/DL (ref 120–199)
CHOLEST/HDLC SERPL: 2.4 {RATIO} (ref 2–5)
CO2 SERPL-SCNC: 21 MMOL/L (ref 23–29)
CREAT SERPL-MCNC: 0.9 MG/DL (ref 0.5–1.4)
DIFFERENTIAL METHOD BLD: NORMAL
EOSINOPHIL # BLD AUTO: 0.2 K/UL (ref 0–0.5)
EOSINOPHIL NFR BLD: 1.6 % (ref 0–8)
ERYTHROCYTE [DISTWIDTH] IN BLOOD BY AUTOMATED COUNT: 13.4 % (ref 11.5–14.5)
EST. GFR  (NO RACE VARIABLE): >60 ML/MIN/1.73 M^2
ESTIMATED AVG GLUCOSE: 148 MG/DL (ref 68–131)
GLUCOSE SERPL-MCNC: 114 MG/DL (ref 70–110)
HBA1C MFR BLD: 6.8 % (ref 4–5.6)
HCT VFR BLD AUTO: 40.9 % (ref 37–48.5)
HDLC SERPL-MCNC: 55 MG/DL (ref 40–75)
HDLC SERPL: 41 % (ref 20–50)
HGB BLD-MCNC: 13.2 G/DL (ref 12–16)
IMM GRANULOCYTES # BLD AUTO: 0.03 K/UL (ref 0–0.04)
IMM GRANULOCYTES NFR BLD AUTO: 0.3 % (ref 0–0.5)
LDLC SERPL CALC-MCNC: 54 MG/DL (ref 63–159)
LYMPHOCYTES # BLD AUTO: 3.1 K/UL (ref 1–4.8)
LYMPHOCYTES NFR BLD: 31.9 % (ref 18–48)
MCH RBC QN AUTO: 29.6 PG (ref 27–31)
MCHC RBC AUTO-ENTMCNC: 32.3 G/DL (ref 32–36)
MCV RBC AUTO: 92 FL (ref 82–98)
MONOCYTES # BLD AUTO: 0.7 K/UL (ref 0.3–1)
MONOCYTES NFR BLD: 7.6 % (ref 4–15)
NEUTROPHILS # BLD AUTO: 5.6 K/UL (ref 1.8–7.7)
NEUTROPHILS NFR BLD: 57.9 % (ref 38–73)
NONHDLC SERPL-MCNC: 79 MG/DL
NRBC BLD-RTO: 0 /100 WBC
PLATELET # BLD AUTO: 305 K/UL (ref 150–450)
PMV BLD AUTO: 12.1 FL (ref 9.2–12.9)
POTASSIUM SERPL-SCNC: 4.1 MMOL/L (ref 3.5–5.1)
PROT SERPL-MCNC: 7.2 G/DL (ref 6–8.4)
RBC # BLD AUTO: 4.46 M/UL (ref 4–5.4)
SODIUM SERPL-SCNC: 137 MMOL/L (ref 136–145)
TRIGL SERPL-MCNC: 125 MG/DL (ref 30–150)
TSH SERPL DL<=0.005 MIU/L-ACNC: 1.23 UIU/ML (ref 0.4–4)
VIT B12 SERPL-MCNC: >2000 PG/ML (ref 210–950)
WBC # BLD AUTO: 9.75 K/UL (ref 3.9–12.7)

## 2024-05-06 PROCEDURE — 36415 COLL VENOUS BLD VENIPUNCTURE: CPT | Mod: HCNC | Performed by: FAMILY MEDICINE

## 2024-05-06 PROCEDURE — 80053 COMPREHEN METABOLIC PANEL: CPT | Mod: HCNC | Performed by: FAMILY MEDICINE

## 2024-05-06 PROCEDURE — 85025 COMPLETE CBC W/AUTO DIFF WBC: CPT | Mod: HCNC | Performed by: FAMILY MEDICINE

## 2024-05-06 PROCEDURE — 83036 HEMOGLOBIN GLYCOSYLATED A1C: CPT | Mod: HCNC | Performed by: FAMILY MEDICINE

## 2024-05-06 PROCEDURE — 82607 VITAMIN B-12: CPT | Mod: HCNC | Performed by: FAMILY MEDICINE

## 2024-05-06 PROCEDURE — 80061 LIPID PANEL: CPT | Mod: HCNC | Performed by: FAMILY MEDICINE

## 2024-05-06 PROCEDURE — 84443 ASSAY THYROID STIM HORMONE: CPT | Mod: HCNC | Performed by: FAMILY MEDICINE

## 2024-05-09 ENCOUNTER — OFFICE VISIT (OUTPATIENT)
Dept: INTERNAL MEDICINE | Facility: CLINIC | Age: 79
End: 2024-05-09
Payer: MEDICARE

## 2024-05-09 VITALS
BODY MASS INDEX: 27.95 KG/M2 | SYSTOLIC BLOOD PRESSURE: 136 MMHG | DIASTOLIC BLOOD PRESSURE: 70 MMHG | HEIGHT: 64 IN | HEART RATE: 82 BPM | OXYGEN SATURATION: 96 % | WEIGHT: 163.69 LBS

## 2024-05-09 DIAGNOSIS — E11.65 TYPE 2 DIABETES MELLITUS WITH HYPERGLYCEMIA, WITHOUT LONG-TERM CURRENT USE OF INSULIN: Primary | ICD-10-CM

## 2024-05-09 DIAGNOSIS — J30.9 ALLERGIC RHINITIS, UNSPECIFIED SEASONALITY, UNSPECIFIED TRIGGER: ICD-10-CM

## 2024-05-09 DIAGNOSIS — E03.9 HYPOTHYROIDISM, UNSPECIFIED TYPE: ICD-10-CM

## 2024-05-09 DIAGNOSIS — R74.8 ELEVATED VITAMIN B12 LEVEL: ICD-10-CM

## 2024-05-09 DIAGNOSIS — E11.8 CONTROLLED TYPE 2 DIABETES MELLITUS WITH COMPLICATION, WITHOUT LONG-TERM CURRENT USE OF INSULIN: ICD-10-CM

## 2024-05-09 DIAGNOSIS — E78.5 HYPERLIPIDEMIA, UNSPECIFIED HYPERLIPIDEMIA TYPE: ICD-10-CM

## 2024-05-09 DIAGNOSIS — I10 HYPERTENSION, UNSPECIFIED TYPE: ICD-10-CM

## 2024-05-09 DIAGNOSIS — E78.5 HYPERLIPIDEMIA ASSOCIATED WITH TYPE 2 DIABETES MELLITUS: ICD-10-CM

## 2024-05-09 DIAGNOSIS — E11.69 HYPERLIPIDEMIA ASSOCIATED WITH TYPE 2 DIABETES MELLITUS: ICD-10-CM

## 2024-05-09 PROCEDURE — 1159F MED LIST DOCD IN RCRD: CPT | Mod: HCNC,CPTII,S$GLB, | Performed by: FAMILY MEDICINE

## 2024-05-09 PROCEDURE — 1101F PT FALLS ASSESS-DOCD LE1/YR: CPT | Mod: HCNC,CPTII,S$GLB, | Performed by: FAMILY MEDICINE

## 2024-05-09 PROCEDURE — 3288F FALL RISK ASSESSMENT DOCD: CPT | Mod: HCNC,CPTII,S$GLB, | Performed by: FAMILY MEDICINE

## 2024-05-09 PROCEDURE — 99999 PR PBB SHADOW E&M-EST. PATIENT-LVL III: CPT | Mod: PBBFAC,HCNC,, | Performed by: FAMILY MEDICINE

## 2024-05-09 PROCEDURE — 3078F DIAST BP <80 MM HG: CPT | Mod: HCNC,CPTII,S$GLB, | Performed by: FAMILY MEDICINE

## 2024-05-09 PROCEDURE — 3075F SYST BP GE 130 - 139MM HG: CPT | Mod: HCNC,CPTII,S$GLB, | Performed by: FAMILY MEDICINE

## 2024-05-09 PROCEDURE — 99214 OFFICE O/P EST MOD 30 MIN: CPT | Mod: HCNC,S$GLB,, | Performed by: FAMILY MEDICINE

## 2024-05-09 PROCEDURE — 2023F DILAT RTA XM W/O RTNOPTHY: CPT | Mod: HCNC,CPTII,S$GLB, | Performed by: FAMILY MEDICINE

## 2024-05-09 RX ORDER — METFORMIN HYDROCHLORIDE 500 MG/1
500 TABLET ORAL 2 TIMES DAILY WITH MEALS
Qty: 180 TABLET | Refills: 1 | Status: SHIPPED | OUTPATIENT
Start: 2024-05-09

## 2024-05-09 RX ORDER — LEVOTHYROXINE SODIUM 75 UG/1
TABLET ORAL
Qty: 90 TABLET | Refills: 3 | Status: SHIPPED | OUTPATIENT
Start: 2024-05-09

## 2024-05-09 RX ORDER — LISINOPRIL 30 MG/1
30 TABLET ORAL DAILY
Qty: 90 TABLET | Refills: 3 | Status: SHIPPED | OUTPATIENT
Start: 2024-05-09

## 2024-05-09 NOTE — PROGRESS NOTES
Subjective:       Patient ID: Patsy Perez is a 78 y.o. female.    Chief Complaint: Follow-up (lab)    Follow-up        Patient Active Problem List   Diagnosis    Hypothyroidism    Hypertension    HLD (hyperlipidemia)    Gallstones    Fatty liver    Type 2 diabetes mellitus with hyperglycemia, without long-term current use of insulin    CKD (chronic kidney disease)    Piriformis syndrome of left side    Weakness    Posture imbalance    H/O sciatica    Diarrhea    Allergic rhinitis    Hyperlipidemia associated with type 2 diabetes mellitus       Past Medical History:   Diagnosis Date    CKD (chronic kidney disease)     DM II (diabetes mellitus, type II), controlled     Fatty liver     Gallstones     found on US    HLD (hyperlipidemia)     Hypertension     Hypothyroidism     s/p thyroidectomy       Past Surgical History:   Procedure Laterality Date    APPENDECTOMY      1985    CATARACT EXTRACTION      CHOLECYSTECTOMY  9/21/22    [facility]    LAPAROSCOPIC CHOLECYSTECTOMY N/A 09/21/2022    Procedure: CHOLECYSTECTOMY, LAPAROSCOPIC;  Surgeon: Roslyn Mckeon DO;  Location: [facility] OR;  Service: General;  Laterality: N/A;    OOPHORECTOMY      THYROIDECTOMY      1985       Family History   Problem Relation Name Age of Onset    Cancer Mother Binta Fischer     Diabetes Mother Binta Fischer     Breast cancer Mother Binta Fischer         late 70s    Osteoarthritis Mother Binta Fischer         double knee replacement    Alzheimer's disease Mother Binta Fischer     Heart disease Father Spike Fischer     Alzheimer's disease Maternal Aunt x2     Alzheimer's disease Maternal Uncle      Alzheimer's disease Maternal Grandmother         Social History     Tobacco Use   Smoking Status Former    Current packs/day: 0.75    Average packs/day: 0.8 packs/day for 25.0 years (18.8 ttl pk-yrs)    Types: Cigarettes   Smokeless Tobacco Never   Tobacco Comments    Quit over  30 yearsago       Wt Readings from Last 5 Encounters:   05/09/24 74.2 kg (163 lb 11.1 oz)   04/02/24 73.9 kg (162 lb 13 oz)   01/12/24 74.5 kg (164 lb 2.1 oz)   10/16/23 76.3 kg (168 lb 3.4 oz)   07/24/23 78.9 kg (174 lb 0.9 oz)       For further HPI details, see assessment and plan.    Review of Systems  feeling great - no acute issues  Objective:      Vitals:    05/09/24 1256   BP: 136/70   Pulse: 82       Physical Exam  Constitutional:       General: She is not in acute distress.     Appearance: She is not ill-appearing.   Pulmonary:      Effort: Pulmonary effort is normal. No respiratory distress.   Neurological:      General: No focal deficit present.      Mental Status: She is alert.   Psychiatric:         Mood and Affect: Mood normal.         Behavior: Behavior normal.         Assessment:       1. Type 2 diabetes mellitus with hyperglycemia, without long-term current use of insulin    2. Hyperlipidemia, unspecified hyperlipidemia type    3. Hypothyroidism, unspecified type    4. Controlled type 2 diabetes mellitus with complication, without long-term current use of insulin    5. Allergic rhinitis, unspecified seasonality, unspecified trigger    6. Hypertension, unspecified type    7. Hyperlipidemia associated with type 2 diabetes mellitus    8. Elevated vitamin B12 level        Plan:   Patient presents for follow-up visit.  She seems to be doing well . Stays physical active    B12 was elevated.  She is since stopped taking it.  Continue hold supplementation.  We will monitor in the future.      Type 2 diabetes  Patient is on metformin 500 mg twice daily along with glimepiride 1 mg daily  She was on Jardiance but discontinued after having yeast infections.  Her A1c is controlled.  We will continue to monitor  Plan to order an A1c in about 3 months.  If we are noting a rise we may need to adjust medications now that she is off Jardiance     Hyperlipidemia  Her cholesterol is well controlled with the use of  pravastatin 80 mg.  Continue medication     Hypertension   Her blood pressure is controlled with the use of amlodipine 10, atenolol 50, lisinopril 30.  Discussed the risk of angioedema with lisinopril.  She knows to monitor for swelling of her lips her mouth or tongue if that ever occurs discontinue lisinopril and seek medical attention.  For now continue other medications     Hypothyroidism  Her TSH is appropriate.  Continue Synthroid at 75 mcg     Allergic rhinitis  Patient utilizes Flonase and has a sufficient supply.  Continue medication     Reviewed entire drug list.  At present time she should be up-to-date on all refills in her drug list is 100% accurate      RSV    A1c 3 mo  F/u me in 6 months    This note was verbally dictated, please excuse any type errors.

## 2024-05-20 ENCOUNTER — HOSPITAL ENCOUNTER (EMERGENCY)
Facility: HOSPITAL | Age: 79
Discharge: HOME | End: 2024-05-20
Attending: EMERGENCY MEDICINE
Payer: MEDICARE

## 2024-05-20 VITALS
DIASTOLIC BLOOD PRESSURE: 87 MMHG | WEIGHT: 161 LBS | RESPIRATION RATE: 16 BRPM | OXYGEN SATURATION: 98 % | BODY MASS INDEX: 27.64 KG/M2 | TEMPERATURE: 98 F | SYSTOLIC BLOOD PRESSURE: 209 MMHG | HEART RATE: 90 BPM

## 2024-05-20 DIAGNOSIS — N76.0 ACUTE VAGINITIS: ICD-10-CM

## 2024-05-20 DIAGNOSIS — N39.0 URINARY TRACT INFECTION WITHOUT HEMATURIA, SITE UNSPECIFIED: ICD-10-CM

## 2024-05-20 DIAGNOSIS — N89.8 VAGINAL ITCHING: Primary | ICD-10-CM

## 2024-05-20 LAB
BACTERIA #/AREA URNS HPF: NORMAL /HPF
BILIRUB UR QL STRIP: NEGATIVE
CLARITY UR: CLEAR
COLOR UR: YELLOW
GLUCOSE UR QL STRIP: NEGATIVE
HGB UR QL STRIP: NEGATIVE
KETONES UR QL STRIP: NEGATIVE
LEUKOCYTE ESTERASE UR QL STRIP: NEGATIVE
MICROSCOPIC COMMENT: NORMAL
NITRITE UR QL STRIP: POSITIVE
PH UR STRIP: 6 [PH] (ref 5–8)
PROT UR QL STRIP: NEGATIVE
SP GR UR STRIP: <1.005 (ref 1–1.03)
URN SPEC COLLECT METH UR: ABNORMAL
UROBILINOGEN UR STRIP-ACNC: ABNORMAL EU/DL
WBC #/AREA URNS HPF: 5 /HPF (ref 0–5)

## 2024-05-20 PROCEDURE — 81000 URINALYSIS NONAUTO W/SCOPE: CPT | Mod: HCNC | Performed by: NURSE PRACTITIONER

## 2024-05-20 PROCEDURE — 96372 THER/PROPH/DIAG INJ SC/IM: CPT | Performed by: NURSE PRACTITIONER

## 2024-05-20 PROCEDURE — 99284 EMERGENCY DEPT VISIT MOD MDM: CPT | Mod: 25,HCNC

## 2024-05-20 PROCEDURE — 63600175 PHARM REV CODE 636 W HCPCS: Mod: HCNC | Performed by: NURSE PRACTITIONER

## 2024-05-20 RX ORDER — CEFTRIAXONE 1 G/1
1 INJECTION, POWDER, FOR SOLUTION INTRAMUSCULAR; INTRAVENOUS
Status: COMPLETED | OUTPATIENT
Start: 2024-05-20 | End: 2024-05-20

## 2024-05-20 RX ORDER — NITROFURANTOIN 25; 75 MG/1; MG/1
100 CAPSULE ORAL 2 TIMES DAILY
Qty: 10 CAPSULE | Refills: 0 | Status: SHIPPED | OUTPATIENT
Start: 2024-05-20 | End: 2024-05-25

## 2024-05-20 RX ORDER — FLUCONAZOLE 150 MG/1
150 TABLET ORAL DAILY
Qty: 2 TABLET | Refills: 0 | Status: SHIPPED | OUTPATIENT
Start: 2024-05-20 | End: 2024-05-22

## 2024-05-20 RX ADMIN — CEFTRIAXONE 1 G: 1 INJECTION, POWDER, FOR SOLUTION INTRAMUSCULAR; INTRAVENOUS at 07:05

## 2024-05-20 NOTE — FIRST PROVIDER EVALUATION
Medical screening examination initiated.  I have conducted a focused provider triage encounter, findings are as follows:    Brief history of present illness:   78-year-old female presents ER complaining of vaginal itching and irritation that has been intermittent and ongoing for several months.  Denies dysuria or hematuria.  Denies abdominal pain.  Denies fever.    Vitals:    05/20/24 1813   BP: (!) 209/87   BP Location: Right arm   Patient Position: Sitting   Pulse: 90   Resp: 16   Temp: 97.5 °F (36.4 °C)   TempSrc: Oral   SpO2: 98%   Weight: 73 kg (161 lb)       Pertinent physical exam:    Hypertensive in triage.  No acute distress    Brief workup plan:   UA    Preliminary workup initiated; this workup will be continued and followed by the physician or advanced practice provider that is assigned to the patient when roomed.

## 2024-05-21 ENCOUNTER — PATIENT OUTREACH (OUTPATIENT)
Dept: EMERGENCY MEDICINE | Facility: HOSPITAL | Age: 79
End: 2024-05-21
Payer: MEDICARE

## 2024-05-21 ENCOUNTER — TELEPHONE (OUTPATIENT)
Dept: INTERNAL MEDICINE | Facility: CLINIC | Age: 79
End: 2024-05-21
Payer: MEDICARE

## 2024-05-21 NOTE — ED PROVIDER NOTES
Encounter Date: 5/20/2024       History     Chief Complaint   Patient presents with    Vaginal Itching     Pt states she has been having vaginal itching and burning for the past the past 6 weeks. Pt states she had a yeast infection and while the buring has gone away the itching has continued.     78-year-old female presents ER complaining of vaginal itching and irritation that has been intermittent and ongoing for several months.  Denies dysuria or hematuria.  Denies abdominal pain.  Denies fever        Review of patient's allergies indicates:  No Known Allergies  Past Medical History:   Diagnosis Date    CKD (chronic kidney disease)     DM II (diabetes mellitus, type II), controlled     Fatty liver     Gallstones     found on US    HLD (hyperlipidemia)     Hypertension     Hypothyroidism     s/p thyroidectomy     Past Surgical History:   Procedure Laterality Date    APPENDECTOMY      1985    CATARACT EXTRACTION      CHOLECYSTECTOMY  9/21/22    HYSTERECTOMY      2009 - Dr. [facility]    LAPAROSCOPIC CHOLECYSTECTOMY N/A 09/21/2022    Procedure: CHOLECYSTECTOMY, LAPAROSCOPIC;  Surgeon: Roslyn Mckeon DO;  Location: [facility] OR;  Service: General;  Laterality: N/A;    OOPHORECTOMY      THYROIDECTOMY      1985     Family History   Problem Relation Name Age of Onset    Cancer Mother Binta Fischer     Diabetes Mother Binta Fischer     Breast cancer Mother Binta Fischer         late 70s    Osteoarthritis Mother Binta Fischer         double knee replacement    Alzheimer's disease Mother Binta Fischer     Heart disease Father Spike Fischer     Alzheimer's disease Maternal Aunt x2     Alzheimer's disease Maternal Uncle      Alzheimer's disease Maternal Grandmother       Social History     Tobacco Use    Smoking status: Former     Current packs/day: 0.75     Average packs/day: 0.8 packs/day for 25.0 years (18.8 ttl pk-yrs)     Types: Cigarettes    Smokeless tobacco: Never    Tobacco  comments:     Quit over 30 yearsago   Substance Use Topics    Alcohol use: Not Currently     Comment: occ    Drug use: Never     Review of Systems   Constitutional:  Negative for fever.   HENT:  Negative for sore throat.    Respiratory:  Negative for shortness of breath.    Cardiovascular:  Negative for chest pain.   Gastrointestinal:  Negative for nausea.   Genitourinary:  Positive for vaginal pain. Negative for dysuria.   Musculoskeletal:  Negative for back pain.   Skin:  Negative for rash.   Neurological:  Negative for weakness.   Hematological:  Does not bruise/bleed easily.       Physical Exam     Initial Vitals [05/20/24 1813]   BP Pulse Resp Temp SpO2   (!) 209/87 90 16 97.5 °F (36.4 °C) 98 %      MAP       --         Physical Exam    Nursing note and vitals reviewed.  Constitutional: She appears well-developed and well-nourished.   HENT:   Head: Normocephalic.   Eyes: Conjunctivae are normal.   Neck: Neck supple.   Cardiovascular:  Normal rate and regular rhythm.           Pulmonary/Chest: Breath sounds normal. No respiratory distress.   Abdominal: She exhibits no distension.   Genitourinary:    Vagina normal.     Musculoskeletal:         General: Normal range of motion.      Cervical back: Neck supple.     Neurological: She is alert and oriented to person, place, and time.   Skin: Skin is warm and dry.   Psychiatric: She has a normal mood and affect.         ED Course   Procedures  Labs Reviewed   URINALYSIS, REFLEX TO URINE CULTURE - Abnormal; Notable for the following components:       Result Value    Specific Gravity, UA <1.005 (*)     Nitrite, UA Positive (*)     Urobilinogen, UA 2.0-3.0 (*)     All other components within normal limits    Narrative:     Specimen Source->Urine   URINALYSIS MICROSCOPIC    Narrative:     Specimen Source->Urine   URINALYSIS, REFLEX TO URINE CULTURE          Imaging Results    None          Medications   cefTRIAXone injection 1 g (has no administration in time range)      Medical Decision Making  Risk  Prescription drug management.                                      Clinical Impression:  Final diagnoses:  [N89.8] Vaginal itching (Primary)  [N76.0] Acute vaginitis  [N39.0] Urinary tract infection without hematuria, site unspecified          ED Disposition Condition    Discharge Stable          ED Prescriptions       Medication Sig Dispense Start Date End Date Auth. Provider    nitrofurantoin, macrocrystal-monohydrate, (MACROBID) 100 MG capsule Take 1 capsule (100 mg total) by mouth 2 (two) times daily. for 5 days 10 capsule 5/20/2024 5/25/2024 Mariama Marcelino NP    fluconazole (DIFLUCAN) 150 MG Tab Take 1 tablet (150 mg total) by mouth once daily. for 2 doses 2 tablet 5/20/2024 5/22/2024 Mariama Marcelino NP          Follow-up Information       Follow up With Specialties Details Why Contact Info    Christ Yepez MD Family Medicine Schedule an appointment as soon as possible for a visit   [address] [place] [id number] [phone]               Mariama Marcelino NP  05/20/24 2720

## 2024-05-21 NOTE — PROGRESS NOTES
Vanita Dubose  ED Navigator  Emergency Department    Project: [facility] [place] ED Navigator  Role: Community Health Worker    Date: 05/21/2024  Patient Name: Patsy Perez  MRN: [id number]  PCP: Christ Yepez MD    Assessment:     Patsy Perez is a 78 y.o. female who has presented to ED for Vaginal itching; Acute vaginitis; Urinary tract infection without hematuria, site unspecified. Patient has visited the ED 1 times in the past 3 months. Patient did not contact PCP.     ED Navigator Initial Assessment    ED Navigator Enrollment Documentation  Consent to Services  Does patient consent to completing the assessment?: Yes  Contact  Method of Initial Contact: Phone  Transportation  Does the patient have issues with Transportation?: No  Does the patient have transportation to and from healthcare appointments?: Yes  Insurance Coverage  Do you have coverage/adequate coverage?: Yes  Type/kind of coverage: [employer]  Is patient able to afford co-pays/deductibles?: Yes  Is patient able to afford HME or supplies?: Yes  Does patient have an established [facility] PCP?: Yes  Able to access?: Yes  Does the patient have a lack of adequate coverage?: No  Specialist Appointment  Did the patient come to the ED to see a specialist?: No  Does the patient have a pending specialist referral?: No  Does the patient have a specialist appointment made?: No  PCP Follow Up Appointment  Has the patient had an appointment with a primary care provider in the past year?: Yes  Approximate date: 4/2/24  Provider: Maki Armstrong PA-C  Does the patient have a follow up appontment with a PCP?: No  When was the last time you saw your PCP?: 4/2/24  Why does the patient not have a follow up scheduled?: Other (see comments) (Comment: Pt unsure that one is needed. Encouraged to schedule)  Medications  Is patient able to afford medication?: Yes  Is patient unable to get medication due to lack of transportation?: No  Psychological  Does the patient have  psycho-social concerns?: No (Comment: No Hx charted)  Food  Does the patient have concerns about food?: No  Communication/Education  Does the patient have limited English proficiency/English not primary language?: No  Does patient have low literacy and/or low health literacy?: Yes  Does patient have concerns with care?: No  Does patient have dissatisfaction with care?: No  Other Financial Concerns  Does the patient have immediate financial distress?: No  Does the patient have general financial concerns?: No  Other Social Barriers/Concerns  Does the patient have any additional barriers or concerns?: Other (see comments) (Comment: Chronic Conditions)  Primary Barrier  Barriers identified: Cognitive barrier (health literacy, language and communication, etc.)  Root Cause of ED Utilization: Chronic Conditions  Plan to address Chronic Conditions: Encourage patient to contact PCP/specialist for follow up per ED discharge instructions  Next steps: Provided Education  Additional Documentation: Pt was seen in the ED on 5/20/24 for Vaginal itching; Acute vaginitis; Urinary tract infection without hematuria, site unspecified. I spoke with pt for Post ED visit follow up navigation to assist with scheduling a 7-day Post ED visit follow up appt. Pt states that she had not contacted pcp to schedule a 7-day Post ED visit follow up appt and was unsure if she needed one. Pt accepted assistance with contacted pcp and requesting a call back to pt to discuss ED visit and determine whether a follow up appt is needed. Pt states that she does not have transportation issues and has no additional needs at this time.  Closing Encounter.    Vanita Dubose               Social History     Socioeconomic History    Marital status:    Tobacco Use    Smoking status: Former     Current packs/day: 0.75     Average packs/day: 0.8 packs/day for 25.0 years (18.8 ttl pk-yrs)     Types: Cigarettes    Smokeless tobacco: Never    Tobacco comments:      Quit over 30 yearsago   Substance and Sexual Activity    Alcohol use: Not Currently     Comment: occ    Drug use: Never    Sexual activity: Yes     Partners: Male     Birth control/protection: Partner-Vasectomy     Social Determinants of Health     Financial Resource Strain: Low Risk  (5/21/2024)    Overall Financial Resource Strain (CARDIA)     Difficulty of Paying Living Expenses: Not hard at all   Food Insecurity: No Food Insecurity (5/21/2024)    Hunger Vital Sign     Worried About Running Out of Food in the Last Year: Never true     Ran Out of Food in the Last Year: Never true   Transportation Needs: No Transportation Needs (5/21/2024)    PRAPARE - Transportation     Lack of Transportation (Medical): No     Lack of Transportation (Non-Medical): No   Physical Activity: Sufficiently Active (1/12/2024)    Exercise Vital Sign     Days of Exercise per Week: 7 days     Minutes of Exercise per Session: 60 min   Stress: No Stress Concern Present (5/21/2024)    [employer] [place] [employer] - Occupational Stress Questionnaire     Feeling of Stress : Not at all   Housing Stability: Low Risk  (5/21/2024)    Housing Stability Vital Sign     Unable to Pay for Housing in the Last Year: No     Homeless in the Last Year: No       Plan:     Pt was seen in the ED on 5/20/24 for Vaginal itching; Acute vaginitis; Urinary tract infection without hematuria, site unspecified. I spoke with pt for Post ED visit follow up navigation to assist with scheduling a 7-day Post ED visit follow up appt. Pt states that she had not contacted pcp to schedule a 7-day Post ED visit follow up appt and was unsure if she needed one. Pt accepted assistance with contacted pcp and requesting a call back to pt to discuss ED visit and determine whether a follow up appt is needed. Pt states that she does not have transportation issues and has no additional needs at this time.  Closing Encounter.    Vanita Dubose

## 2024-05-21 NOTE — TELEPHONE ENCOUNTER
----- Message from Vanita Dubose sent at 5/21/2024  1:44 PM CDT -----  Regarding: Post ED visit follow up appt within 7 days - D/C date 5/20/24  Good afternoon,    Pt was seen in the ED on 5/20/24 for Vaginal itching; Acute vaginitis; Urinary tract infection without hematuria, site unspecified, and would like to be contacted to discuss ED visit to determine if a follow up appt is needed. Please contact pt to discuss recent visit and schedule a follow up appt by 5/27/24 if necessary.    Thank you for your assistance,  Vanita Dubose

## 2024-05-21 NOTE — TELEPHONE ENCOUNTER
Patient informed ER follow up visit not needed unless symptoms not getting better. Patient verbally understood.

## 2024-06-03 ENCOUNTER — TELEPHONE (OUTPATIENT)
Dept: INTERNAL MEDICINE | Facility: CLINIC | Age: 79
End: 2024-06-03
Payer: MEDICARE

## 2024-06-03 ENCOUNTER — OFFICE VISIT (OUTPATIENT)
Dept: URGENT CARE | Facility: CLINIC | Age: 79
End: 2024-06-03
Payer: MEDICARE

## 2024-06-03 VITALS
WEIGHT: 160.94 LBS | TEMPERATURE: 98 F | HEIGHT: 64 IN | RESPIRATION RATE: 17 BRPM | OXYGEN SATURATION: 98 % | HEART RATE: 73 BPM | DIASTOLIC BLOOD PRESSURE: 82 MMHG | SYSTOLIC BLOOD PRESSURE: 160 MMHG | BODY MASS INDEX: 27.48 KG/M2

## 2024-06-03 DIAGNOSIS — R35.0 URINARY FREQUENCY: ICD-10-CM

## 2024-06-03 DIAGNOSIS — N89.8 VAGINAL DRYNESS: Primary | ICD-10-CM

## 2024-06-03 LAB
BILIRUB UR QL STRIP: NEGATIVE
GLUCOSE UR QL STRIP: NEGATIVE
KETONES UR QL STRIP: NEGATIVE
LEUKOCYTE ESTERASE UR QL STRIP: NEGATIVE
PH, POC UA: 5
POC BLOOD, URINE: NEGATIVE
POC NITRATES, URINE: NEGATIVE
PROT UR QL STRIP: NEGATIVE
SP GR UR STRIP: 1 (ref 1–1.03)
UROBILINOGEN UR STRIP-ACNC: NORMAL (ref 0.1–1.1)

## 2024-06-03 PROCEDURE — 81003 URINALYSIS AUTO W/O SCOPE: CPT | Mod: QW,S$GLB,, | Performed by: PHYSICIAN ASSISTANT

## 2024-06-03 PROCEDURE — 99214 OFFICE O/P EST MOD 30 MIN: CPT | Mod: S$GLB,,, | Performed by: PHYSICIAN ASSISTANT

## 2024-06-03 NOTE — PATIENT INSTRUCTIONS
A referral has be placed for you to follow up with Gynecology. Someone should be contacting you soon to set up appointment. However, you may call [phone] at anytime to schedule this follow up appointment.

## 2024-06-03 NOTE — PROGRESS NOTES
"Subjective:      Patient ID: Patsy Perez is a 78 y.o. female.    Vitals:  height is 5' 4" (1.626 m) and weight is 73 kg (160 lb 15 oz). Her oral temperature is 98.1 °F (36.7 °C). Her blood pressure is 160/82 (abnormal) and her pulse is 73. Her respiration is 17 and oxygen saturation is 98%.     Chief Complaint: Urinary Frequency    Pt presents to clinic with frequency and painful/burning urination that came back on the 29th of May.  Pt stated that she was treated on the 20th of May for a UTI infection and was given medication which she completed but once done with it several days later her symptoms return.  She feels vaginal discomfort and dryness. Pt denies any fever.     Urinary Frequency   This is a recurrent problem. The current episode started in the past 7 days. The problem has been unchanged. The quality of the pain is described as burning. The pain is at a severity of 4/10. The pain is mild. There has been no fever. Associated symptoms include frequency. Pertinent negatives include no flank pain, hematuria or bubble bath use. She has tried increased fluids and antibiotics for the symptoms. The treatment provided moderate relief. Her past medical history is significant for diabetes mellitus and hypertension.       Genitourinary:  Positive for frequency. Negative for flank pain and hematuria.      Objective:     Physical Exam   Constitutional: She is oriented to person, place, and time. She appears well-developed.   HENT:   Head: Normocephalic and atraumatic.   Ears:   Right Ear: External ear normal.   Left Ear: External ear normal.   Nose: Nose normal. No nasal deformity. No epistaxis.   Mouth/Throat: Oropharynx is clear and moist and mucous membranes are normal.   Eyes: Lids are normal.   Neck: Trachea normal and phonation normal. Neck supple.   Cardiovascular: Normal pulses.   Pulmonary/Chest: Effort normal.   Abdominal: Normal appearance. Soft. There is no abdominal tenderness.   Neurological: She is " alert and oriented to person, place, and time.   Skin: Skin is warm, dry and intact.   Psychiatric: Her speech is normal and behavior is normal.   Nursing note and vitals reviewed.      Assessment:     1. Vaginal dryness    2. Urinary frequency      Results for orders placed or performed in visit on 06/03/24   POCT Urinalysis, Dipstick, Automated, W/O Scope   Result Value Ref Range    POC Blood, Urine Negative Negative    POC Bilirubin, Urine Negative Negative    POC Urobilinogen, Urine norm 0.1 - 1.1    POC Ketones, Urine Negative Negative    POC Protein, Urine Negative Negative    POC Nitrates, Urine Negative Negative    POC Glucose, Urine Negative Negative    pH, UA 5.0     POC Specific Gravity, Urine 1.005 1.003 - 1.029    POC Leukocytes, Urine Negative Negative       Plan:   VSS. Patient non-toxic appearing. Discussed medication being prescribed.  Advised patient to follow up with gynecology. Referral placed.  Patient verbalized understanding, agrees with the plan, and is comfortable with discharge.      Vaginal dryness  -     Ambulatory referral/consult to Gynecology    Urinary frequency  -     POCT Urinalysis, Dipstick, Automated, W/O Scope      Medical Decision Making:   Clinical Tests:   Lab Tests: Ordered and Reviewed  The following lab test(s) were unremarkable: Urinalysis

## 2024-06-03 NOTE — TELEPHONE ENCOUNTER
Called patient and let her know to go to urgent care. Verbalized understanding.    X Size Of Lesion In Cm (Optional): 0

## 2024-06-03 NOTE — TELEPHONE ENCOUNTER
----- Message from Laurel Isiah sent at 6/3/2024  8:15 AM CDT -----  Contact: CARLOTA NOEL [[id number]]  ..Type:  Patient Requesting Call    Who Called: CARLOTA NOEL [[id number]]  Does the patient know what this is regarding?: pt still having trouble with UTI, Pt reports was advised by nurse Christy to call back if problem continued  Would the patient rather a call back or a response via [facility]?  call  Best Call Back Number: .[phone] (home)   Additional Information:     .  [id number] - [place] - [address] AT [facility]  [address]  [place] [address]  Phone: [phone] Fax: [phone]

## 2024-06-04 ENCOUNTER — OFFICE VISIT (OUTPATIENT)
Dept: OBSTETRICS AND GYNECOLOGY | Facility: CLINIC | Age: 79
End: 2024-06-04
Payer: MEDICARE

## 2024-06-04 VITALS
SYSTOLIC BLOOD PRESSURE: 164 MMHG | DIASTOLIC BLOOD PRESSURE: 72 MMHG | HEIGHT: 64 IN | BODY MASS INDEX: 28.53 KG/M2 | WEIGHT: 167.13 LBS

## 2024-06-04 DIAGNOSIS — N90.89 VULVAR IRRITATION: Primary | ICD-10-CM

## 2024-06-04 PROCEDURE — 99203 OFFICE O/P NEW LOW 30 MIN: CPT | Mod: HCNC,S$GLB,, | Performed by: NURSE PRACTITIONER

## 2024-06-04 PROCEDURE — 1101F PT FALLS ASSESS-DOCD LE1/YR: CPT | Mod: HCNC,CPTII,S$GLB, | Performed by: NURSE PRACTITIONER

## 2024-06-04 PROCEDURE — 99999 PR PBB SHADOW E&M-EST. PATIENT-LVL III: CPT | Mod: PBBFAC,HCNC,, | Performed by: NURSE PRACTITIONER

## 2024-06-04 PROCEDURE — 1126F AMNT PAIN NOTED NONE PRSNT: CPT | Mod: HCNC,CPTII,S$GLB, | Performed by: NURSE PRACTITIONER

## 2024-06-04 PROCEDURE — 1160F RVW MEDS BY RX/DR IN RCRD: CPT | Mod: HCNC,CPTII,S$GLB, | Performed by: NURSE PRACTITIONER

## 2024-06-04 PROCEDURE — 3077F SYST BP >= 140 MM HG: CPT | Mod: HCNC,CPTII,S$GLB, | Performed by: NURSE PRACTITIONER

## 2024-06-04 PROCEDURE — 3288F FALL RISK ASSESSMENT DOCD: CPT | Mod: HCNC,CPTII,S$GLB, | Performed by: NURSE PRACTITIONER

## 2024-06-04 PROCEDURE — 3078F DIAST BP <80 MM HG: CPT | Mod: HCNC,CPTII,S$GLB, | Performed by: NURSE PRACTITIONER

## 2024-06-04 PROCEDURE — 1159F MED LIST DOCD IN RCRD: CPT | Mod: HCNC,CPTII,S$GLB, | Performed by: NURSE PRACTITIONER

## 2024-06-04 RX ORDER — CLOTRIMAZOLE AND BETAMETHASONE DIPROPIONATE 10; .64 MG/G; MG/G
CREAM TOPICAL 2 TIMES DAILY
Qty: 45 G | Refills: 2 | Status: SHIPPED | OUTPATIENT
Start: 2024-06-04 | End: 2024-07-04

## 2024-06-04 NOTE — PROGRESS NOTES
Subjective:       Patient ID: Patsy Perez is a 78 y.o. female.    Chief Complaint:  Vaginal Atrophy    No LMP recorded. Patient has had a hysterectomy.  History of Present Illness  Complains of vulva irritation   Whenever she sit down you can feel the irritation  No vaginal dryness no problems with intercourse        OB History    Para Term  AB Living   2 2 2         SAB IAB Ectopic Multiple Live Births                  # Outcome Date GA Lbr Tommy/2nd Weight Sex Type Anes PTL Lv   2 Term            1 Term                Review of Systems  Review of Systems        Objective:    Physical Exam  Genitourinary:     General: Normal vulva.      Exam position: Lithotomy position.             Assessment:     1. Vulvar irritation              Plan:   Patsy was seen today for vaginal atrophy.    Diagnoses and all orders for this visit:    Vulvar irritation  -     clotrimazole-betamethasone 1-0.05% (LOTRISONE) cream; Apply topically 2 (two) times daily.      If cream does not resolve issues recommend see dermatology

## 2024-07-03 ENCOUNTER — HOSPITAL ENCOUNTER (EMERGENCY)
Facility: HOSPITAL | Age: 79
Discharge: HOME | End: 2024-07-03
Attending: EMERGENCY MEDICINE
Payer: MEDICARE

## 2024-07-03 VITALS
RESPIRATION RATE: 17 BRPM | TEMPERATURE: 98 F | BODY MASS INDEX: 27.32 KG/M2 | WEIGHT: 164 LBS | HEART RATE: 60 BPM | SYSTOLIC BLOOD PRESSURE: 147 MMHG | DIASTOLIC BLOOD PRESSURE: 69 MMHG | HEIGHT: 65 IN | OXYGEN SATURATION: 100 %

## 2024-07-03 DIAGNOSIS — R07.9 CHEST PAIN: ICD-10-CM

## 2024-07-03 DIAGNOSIS — I10 ESSENTIAL HYPERTENSION: ICD-10-CM

## 2024-07-03 DIAGNOSIS — I49.3 PVC (PREMATURE VENTRICULAR CONTRACTION): Primary | ICD-10-CM

## 2024-07-03 LAB
ALBUMIN SERPL BCP-MCNC: 3.7 G/DL (ref 3.5–5.2)
ALP SERPL-CCNC: 60 U/L (ref 55–135)
ALT SERPL W/O P-5'-P-CCNC: 26 U/L (ref 10–44)
ANION GAP SERPL CALC-SCNC: 13 MMOL/L (ref 8–16)
AST SERPL-CCNC: 24 U/L (ref 10–40)
BASOPHILS # BLD AUTO: 0.05 K/UL (ref 0–0.2)
BASOPHILS NFR BLD: 0.6 % (ref 0–1.9)
BILIRUB SERPL-MCNC: 0.5 MG/DL (ref 0.1–1)
BUN SERPL-MCNC: 13 MG/DL (ref 8–23)
CALCIUM SERPL-MCNC: 9.2 MG/DL (ref 8.7–10.5)
CHLORIDE SERPL-SCNC: 105 MMOL/L (ref 95–110)
CO2 SERPL-SCNC: 19 MMOL/L (ref 23–29)
CREAT SERPL-MCNC: 1 MG/DL (ref 0.5–1.4)
DIFFERENTIAL METHOD BLD: NORMAL
EOSINOPHIL # BLD AUTO: 0.1 K/UL (ref 0–0.5)
EOSINOPHIL NFR BLD: 0.7 % (ref 0–8)
ERYTHROCYTE [DISTWIDTH] IN BLOOD BY AUTOMATED COUNT: 13.2 % (ref 11.5–14.5)
EST. GFR  (NO RACE VARIABLE): 58 ML/MIN/1.73 M^2
GLUCOSE SERPL-MCNC: 149 MG/DL (ref 70–110)
HCT VFR BLD AUTO: 40.3 % (ref 37–48.5)
HCV AB SERPL QL IA: NEGATIVE
HEP C VIRUS HOLD SPECIMEN: NORMAL
HGB BLD-MCNC: 13.4 G/DL (ref 12–16)
HIV 1+2 AB+HIV1 P24 AG SERPL QL IA: NEGATIVE
IMM GRANULOCYTES # BLD AUTO: 0.02 K/UL (ref 0–0.04)
IMM GRANULOCYTES NFR BLD AUTO: 0.2 % (ref 0–0.5)
LYMPHOCYTES # BLD AUTO: 1.8 K/UL (ref 1–4.8)
LYMPHOCYTES NFR BLD: 22.1 % (ref 18–48)
MCH RBC QN AUTO: 29.5 PG (ref 27–31)
MCHC RBC AUTO-ENTMCNC: 33.3 G/DL (ref 32–36)
MCV RBC AUTO: 89 FL (ref 82–98)
MONOCYTES # BLD AUTO: 0.5 K/UL (ref 0.3–1)
MONOCYTES NFR BLD: 5.9 % (ref 4–15)
NEUTROPHILS # BLD AUTO: 5.8 K/UL (ref 1.8–7.7)
NEUTROPHILS NFR BLD: 70.5 % (ref 38–73)
NRBC BLD-RTO: 0 /100 WBC
OHS QRS DURATION: 76 MS
OHS QTC CALCULATION: 410 MS
PLATELET # BLD AUTO: 312 K/UL (ref 150–450)
PMV BLD AUTO: 10.5 FL (ref 9.2–12.9)
POTASSIUM SERPL-SCNC: 4.2 MMOL/L (ref 3.5–5.1)
PROT SERPL-MCNC: 7.1 G/DL (ref 6–8.4)
RBC # BLD AUTO: 4.54 M/UL (ref 4–5.4)
SODIUM SERPL-SCNC: 137 MMOL/L (ref 136–145)
TROPONIN I SERPL DL<=0.01 NG/ML-MCNC: <0.006 NG/ML (ref 0–0.03)
WBC # BLD AUTO: 8.29 K/UL (ref 3.9–12.7)

## 2024-07-03 PROCEDURE — 93005 ELECTROCARDIOGRAM TRACING: CPT | Mod: HCNC

## 2024-07-03 PROCEDURE — 93010 ELECTROCARDIOGRAM REPORT: CPT | Mod: HCNC,,, | Performed by: INTERNAL MEDICINE

## 2024-07-03 PROCEDURE — 84484 ASSAY OF TROPONIN QUANT: CPT | Mod: HCNC | Performed by: EMERGENCY MEDICINE

## 2024-07-03 PROCEDURE — 87389 HIV-1 AG W/HIV-1&-2 AB AG IA: CPT | Mod: HCNC | Performed by: EMERGENCY MEDICINE

## 2024-07-03 PROCEDURE — 80053 COMPREHEN METABOLIC PANEL: CPT | Mod: HCNC | Performed by: EMERGENCY MEDICINE

## 2024-07-03 PROCEDURE — 99285 EMERGENCY DEPT VISIT HI MDM: CPT | Mod: 25,HCNC

## 2024-07-03 PROCEDURE — 86803 HEPATITIS C AB TEST: CPT | Mod: HCNC | Performed by: EMERGENCY MEDICINE

## 2024-07-03 PROCEDURE — 85025 COMPLETE CBC W/AUTO DIFF WBC: CPT | Mod: HCNC | Performed by: EMERGENCY MEDICINE

## 2024-07-03 NOTE — ED PROVIDER NOTES
"SCRIBE #1 NOTE: I, Blake Mckeon, am scribing for, and in the presence of, Taj Bernabe MD. I have scribed the entire note.       History     Chief Complaint   Patient presents with    Chest Pain     C/o chest pain heaviness x 2 days. Denies sob or cardiac history. Pt hypertensive in triage, states took bp meds pta. Denies HA, dizziness, blurred vision.      Review of patient's allergies indicates:  No Known Allergies      History of Present Illness     HPI    7/3/2024, 8:25 AM  History obtained from the patient      History of Present Illness: Patsy Perez is a 78 y.o. female patient with a PMHx of HTN, DM Type II, HLD, hypothyroidism, and CKD who presents to the Emergency Department for evaluation of chest heaviness which onset gradually past two days. Pt reports a "funny feeling in chest" shortly after having a large dinner with steak and butter. Pt says that she was "nervous" after taking an at home BP monitor, which indicated she might have possible afib. Pain is not caused by physical exertion. Pt reports no history of heartburns. Symptoms are intermittent and moderate in severity. No mitigating or exacerbating factors reported. Associated sxs include loss of appetite, palpitations, and diarrhea. Patient denies any chest pain, leg pains/swelling, SOB, vomiting, diaphoresis, and all other sxs at this time. No prior Tx. Pt reports that her father has a Hx of cardiac issues. Pt takes blood thinners and BP medication but is compliant with both medications. No further complaints or concerns at this time.       Arrival mode: Personal vehicle      PCP: Christ Yepez MD        Past Medical History:  Past Medical History:   Diagnosis Date    CKD (chronic kidney disease)     DM II (diabetes mellitus, type II), controlled     Fatty liver     Gallstones     found on US    HLD (hyperlipidemia)     Hypertension     Hypothyroidism     s/p thyroidectomy       Past Surgical History:  Past Surgical History:   Procedure " Laterality Date    APPENDECTOMY      1985    CATARACT EXTRACTION      CHOLECYSTECTOMY  9/21/22    HYSTERECTOMY      2009 - Dr. Thorne [facility]    LAPAROSCOPIC CHOLECYSTECTOMY N/A 09/21/2022    Procedure: CHOLECYSTECTOMY, LAPAROSCOPIC;  Surgeon: Roslyn Mckeon DO;  Location: [facility] OR;  Service: General;  Laterality: N/A;    OOPHORECTOMY      THYROIDECTOMY      1985         Family History:  Family History   Problem Relation Name Age of Onset    Cancer Mother Binta Fischer     Diabetes Mother Binta Fischer     Breast cancer Mother Binta Fischer         late 70s    Osteoarthritis Mother Binta Fischer         double knee replacement    Alzheimer's disease Mother Binta Fischer     Heart disease Father Spike Fischer     Alzheimer's disease Maternal Aunt x2     Alzheimer's disease Maternal Uncle      Alzheimer's disease Maternal Grandmother         Social History:  Social History     Tobacco Use    Smoking status: Former     Current packs/day: 0.75     Average packs/day: 0.8 packs/day for 25.0 years (18.8 ttl pk-yrs)     Types: Cigarettes    Smokeless tobacco: Never    Tobacco comments:     Quit over 30 yearsago   Substance and Sexual Activity    Alcohol use: Not Currently     Comment: occ    Drug use: Never    Sexual activity: Yes     Partners: Male     Birth control/protection: Partner-Vasectomy        Review of Systems     Review of Systems   Constitutional:  Positive for appetite change (loss of appetite). Negative for diaphoresis and fever.   HENT:  Negative for sore throat.    Respiratory:  Negative for cough and shortness of breath.         (+) Chest heaviness    Cardiovascular:  Positive for palpitations. Negative for chest pain and leg swelling.   Gastrointestinal:  Positive for diarrhea. Negative for nausea and vomiting.   Genitourinary:  Negative for dysuria.   Musculoskeletal:  Negative for back pain and myalgias (leg).   Skin:  Negative for rash.   Neurological:  Negative  "for weakness, light-headedness and headaches.   Hematological:  Does not bruise/bleed easily.   All other systems reviewed and are negative.       Physical Exam     Initial Vitals [07/03/24 0758]   BP Pulse Resp Temp SpO2   (!) 187/83 78 18 97.7 °F (36.5 °C) 98 %      MAP       --          Physical Exam  Nursing Notes and Vital Signs Reviewed.  Constitutional: Patient is in no acute distress. Well-developed and well-nourished.  Head: Atraumatic. Normocephalic.  Eyes: PERRL. EOM intact. Conjunctivae are not pale. No scleral icterus.  ENT: Mucous membranes are moist.   Neck: Supple. Full ROM.   Cardiovascular: Regular rate. Regular rhythm. No murmurs, rubs, or gallops. Distal pulses are 2+ and symmetric.  Pulmonary/Chest: No respiratory distress. Clear to auscultation bilaterally. No wheezing or rales.  Abdominal: Soft and non-distended.  There is no tenderness.  No rebound, guarding, or rigidity.   Genitourinary: No CVA tenderness  Musculoskeletal: Moves all extremities. No obvious deformities. No edema.   Skin: Warm and dry.  Neurological:  Alert, awake, and appropriate.  Normal speech.  No acute focal neurological deficits are appreciated.  Psychiatric: Normal affect. Good eye contact. Appropriate in content.     ED Course   Procedures  ED Vital Signs:  Vitals:    07/03/24 0758 07/03/24 0835   BP: (!) 187/83 117/77   Pulse: 78 67   Resp: 18 17   Temp: 97.7 °F (36.5 °C)    TempSrc: Oral    SpO2: 98% 100%   Weight: 74.4 kg (164 lb)    Height: 5' 5" (1.651 m)        Abnormal Lab Results:  Labs Reviewed   COMPREHENSIVE METABOLIC PANEL - Abnormal; Notable for the following components:       Result Value    CO2 19 (*)     Glucose 149 (*)     eGFR 58 (*)     All other components within normal limits   HIV 1 / 2 ANTIBODY    Narrative:     Release to patient->Immediate   HEPATITIS C ANTIBODY    Narrative:     Release to patient->Immediate   HEP C VIRUS HOLD SPECIMEN    Narrative:     Release to patient->Immediate   CBC W/ " AUTO DIFFERENTIAL   TROPONIN I        All Lab Results:  Results for orders placed or performed during the hospital encounter of 07/03/24   HIV 1/2 Ag/Ab (4th Gen)   Result Value Ref Range    HIV 1/2 Ag/Ab Negative Negative   Hepatitis C Antibody   Result Value Ref Range    Hepatitis C Ab Negative Negative   HCV Virus Hold Specimen   Result Value Ref Range    HEP C Virus Hold Specimen Hold for HCV sendout    CBC auto differential   Result Value Ref Range    WBC 8.29 3.90 - 12.70 K/uL    RBC 4.54 4.00 - 5.40 M/uL    Hemoglobin 13.4 12.0 - 16.0 g/dL    Hematocrit 40.3 37.0 - 48.5 %    MCV 89 82 - 98 fL    MCH 29.5 27.0 - 31.0 pg    MCHC 33.3 32.0 - 36.0 g/dL    RDW 13.2 11.5 - 14.5 %    Platelets 312 150 - 450 K/uL    MPV 10.5 9.2 - 12.9 fL    Immature Granulocytes 0.2 0.0 - 0.5 %    Gran # (ANC) 5.8 1.8 - 7.7 K/uL    Immature Grans (Abs) 0.02 0.00 - 0.04 K/uL    Lymph # 1.8 1.0 - 4.8 K/uL    Mono # 0.5 0.3 - 1.0 K/uL    Eos # 0.1 0.0 - 0.5 K/uL    Baso # 0.05 0.00 - 0.20 K/uL    nRBC 0 0 /100 WBC    Gran % 70.5 38.0 - 73.0 %    Lymph % 22.1 18.0 - 48.0 %    Mono % 5.9 4.0 - 15.0 %    Eosinophil % 0.7 0.0 - 8.0 %    Basophil % 0.6 0.0 - 1.9 %    Differential Method Automated    Comprehensive metabolic panel   Result Value Ref Range    Sodium 137 136 - 145 mmol/L    Potassium 4.2 3.5 - 5.1 mmol/L    Chloride 105 95 - 110 mmol/L    CO2 19 (L) 23 - 29 mmol/L    Glucose 149 (H) 70 - 110 mg/dL    BUN 13 8 - 23 mg/dL    Creatinine 1.0 0.5 - 1.4 mg/dL    Calcium 9.2 8.7 - 10.5 mg/dL    Total Protein 7.1 6.0 - 8.4 g/dL    Albumin 3.7 3.5 - 5.2 g/dL    Total Bilirubin 0.5 0.1 - 1.0 mg/dL    Alkaline Phosphatase 60 55 - 135 U/L    AST 24 10 - 40 U/L    ALT 26 10 - 44 U/L    eGFR 58 (A) >60 mL/min/1.73 m^2    Anion Gap 13 8 - 16 mmol/L   Troponin I   Result Value Ref Range    Troponin I <0.006 0.000 - 0.026 ng/mL   EKG 12-lead   Result Value Ref Range    QRS Duration 76 ms    OHS QTC Calculation 410 ms        Imaging  Results:  Imaging Results              X-Ray Chest AP Portable (Final result)  Result time 07/03/24 08:40:23      Final result by Jeremy Mar MD (07/03/24 08:40:23)                   Impression:      No acute abnormality.      Electronically signed by: Jeremy Mar  Date:    07/03/2024  Time:    08:40               Narrative:    EXAMINATION:  XR CHEST AP PORTABLE    CLINICAL HISTORY:  chest pain;    TECHNIQUE:  Single frontal view of the chest was performed.    COMPARISON:  None    FINDINGS:  The lungs are clear, with normal appearance of pulmonary vasculature and no pleural effusion or pneumothorax.    The cardiac silhouette is normal in size. The hilar and mediastinal contours are unremarkable.    Bones are intact.                                       The EKG was ordered, reviewed, and independently interpreted by the ED provider.  Interpretation time: 8:03  Rate: 70 BPM  Rhythm: normal sinus rhythm  Interpretation: Cannot rule out Anterior infarct, age undetermined. No STEMI.             The Emergency Provider reviewed the vital signs and test results, which are outlined above.     ED Discussion     10:19 AM: Reassessed pt at this time. Discussed with pt all pertinent ED information and results. Discussed pt dx and plan of tx. Gave pt all f/u and return to the ED instructions. All questions and concerns were addressed at this time. Pt expresses understanding of information and instructions, and is comfortable with plan to discharge. Pt is stable for discharge.    I discussed with patient and/or family/caretaker that evaluation in the ED does not suggest any emergent or life threatening medical conditions requiring immediate intervention beyond what was provided in the ED, and I believe patient is safe for discharge.  Regardless, an unremarkable evaluation in the ED does not preclude the development or presence of a serious of life threatening condition. As such, patient was instructed to return  "immediately for any worsening or change in current symptoms.     ED Course as of 07/03/24 1021   Wed Jul 03, 2024   1016 Patient is having infrequent PVCs.  Otherwise normal sinus rhythm on the cardiac monitor.  Patient did note that her symptoms of "feeling funny" in her chest do correspond with the PVCs on the monitor.  She is already on atenolol.  Will refer to Cardiology.  Asked that she reduce her caffeine intake [DP]      ED Course User Index  [DP] Taj Bernabe MD     Medical Decision Making  78-year-old female presented with complaints of a "funny feeling" in her chest, slightly elevated blood pressure readings, and home blood pressure monitor that was indicating she might be in atrial fibrillation.  No prior history of atrial fibrillation.  Differential diagnosis includes but not limited to myocardial infarction, GERD, arrhythmias, hypertensive urgency, hypertensive emergency, essential hypertension, anxiety.  Patient was found to have infrequent PVCs on the cardiac monitor, and patient's symptoms of "funny feeling" did correspond when she was having those PVCs.  Troponin within normal limits.  Onset of chest pain allows 1 troponin to rule out MI. patient on atenolol.  Referred to Cardiology    Amount and/or Complexity of Data Reviewed  External Data Reviewed: notes.     Details: Past medical history, medications, allergies reviewed  Labs: ordered. Decision-making details documented in ED Course.  Radiology: ordered. Decision-making details documented in ED Course.  ECG/medicine tests: ordered and independent interpretation performed. Decision-making details documented in ED Course.    Risk  OTC drugs.  Prescription drug management.                ED Medication(s):  Medications - No data to display    New Prescriptions    No medications on file        Follow-up Information       Schedule an appointment as soon as possible for a visit  with Cyrus Schwarz MD.    Specialties: Interventional " Cardiology, Cardiology  Contact information:  [address] [place] [address]  [phone]               [facility] - Emergency Dept..    Specialty: Emergency Medicine  Why: As needed, If symptoms worsen  Contact information:  [address] [address] [phone]             Schedule an appointment as soon as possible for a visit  with Christ Yepez MD.    Specialty: Family Medicine  Contact information:  [address]  [place] [address]  [phone]                                 Scribe Attestation:   Scribe #1: I performed the above scribed service and the documentation accurately describes the services I performed. I attest to the accuracy of the note.     Attending:   Physician Attestation Statement for Scribe #1: I, Taj Bernabe MD, personally performed the services described in this documentation, as scribed by Blake Mckeon, in my presence, and it is both accurate and complete.           Clinical Impression       ICD-10-CM ICD-9-CM   1. PVC (premature ventricular contraction)  I49.3 427.69   2. Chest pain  R07.9 786.50   3. Essential hypertension  I10 401.9       Disposition:   Disposition: Discharged  Condition: Stable        Taj Bernabe MD  07/08/24 1224

## 2024-07-03 NOTE — PHARMACY MED REC
"Admission Medication History     The home medication history was taken by Jesus Lima.    You may go to "Admission" then "Reconcile Home Medications" tabs to review and/or act upon these items.     The home medication list has been updated by the Pharmacy department.   Please read ALL comments highlighted in yellow.   Please address this information as you see fit.    Feel free to contact us if you have any questions or require assistance.      Medications listed below were obtained from: [employer] software- [employer], Medical records, and Patient's pharmacy  (Not in a hospital admission)    Jesus Lima  [id number]    Current Outpatient Medications on File Prior to Encounter   Medication Sig Dispense Refill Last Dose    amLODIPine (NORVASC) 10 MG tablet Take 1 tablet (10 mg total) by mouth once daily. 90 tablet 3 7/3/2024    aspirin (ECOTRIN) 81 MG EC tablet Aspir-81 81 mg tablet,delayed release  Take 1 tablet every day by oral route for 30 days. (Patient taking differently: Take 81 mg by mouth every evening. Aspir-81 81 mg tablet,delayed release  Take 1 tablet every day by oral route for 30 days.) 90 tablet 1 7/3/2024    atenoloL (TENORMIN) 50 MG tablet Take 1 tablet (50 mg total) by mouth once daily. 90 tablet 3 7/3/2024    glimepiride (AMARYL) 1 MG tablet Take 1 tablet (1 mg total) by mouth before breakfast. 90 tablet 3 7/3/2024    levothyroxine (SYNTHROID) 75 MCG tablet TAKE 1 TABLET BY MOUTH EVERY DAY 90 tablet 3 7/3/2024    lisinopriL (PRINIVIL,ZESTRIL) 30 MG tablet Take 1 tablet (30 mg total) by mouth once daily. 90 tablet 3 7/3/2024    metFORMIN (GLUCOPHAGE) 500 MG tablet Take 1 tablet (500 mg total) by mouth 2 (two) times daily with meals. 180 tablet 1 7/3/2024    multivitamin (ONE DAILY MULTIVITAMIN) per tablet Take 1 tablet by mouth once daily.   7/3/2024    pravastatin (PRAVACHOL) 80 MG tablet Take 1 tablet (80 mg total) by mouth once daily. 90 tablet 3 7/3/2024    blood sugar diagnostic " (ACCU-CHEK SMARTVIEW TEST STRIP) Strp Use to test blood glucose two (2) times daily as directed with insurance preferred meter and supplies 200 strip 3     clotrimazole-betamethasone 1-0.05% (LOTRISONE) cream Apply topically 2 (two) times daily. 45 g 2     fluticasone propionate (FLONASE) 50 mcg/actuation nasal spray SHAKE LIQUID AND USE 1 SPRAY(50 MCG) IN EACH NOSTRIL EVERY DAY 48 g 3     lancets (ACCU-CHEK FASTCLIX LANCET DRUM) Misc Use to test blood glucose two (2) times daily as directed with insurance preferred meter and supplies 200 each 3     loperamide (IMODIUM A-D) 2 mg Tab Take 2 mg by mouth 4 (four) times daily as needed (ANTI DIARRHEAL).       RSVPreF3 antigen-AS01E, PF, (AREXVY, PF,) 120 mcg/0.5 mL SusR vaccine Inject into the muscle. 0.5 mL 0              '            .

## 2024-07-08 ENCOUNTER — TELEPHONE (OUTPATIENT)
Dept: INTERNAL MEDICINE | Facility: CLINIC | Age: 79
End: 2024-07-08
Payer: MEDICARE

## 2024-07-08 NOTE — TELEPHONE ENCOUNTER
----- Message from Veronica Reeves sent at 7/8/2024 11:15 AM CDT -----  Contact: pt  Pt is calling in rgd to needing the nurse to call her about her ER visit over the weekend.  She does have a cardio visit on tomorrow, but not sure if she shld come in to see dr. Yepez.  Please call her back at [phone]

## 2024-07-09 ENCOUNTER — OFFICE VISIT (OUTPATIENT)
Dept: CARDIOLOGY | Facility: CLINIC | Age: 79
End: 2024-07-09
Payer: MEDICARE

## 2024-07-09 VITALS
SYSTOLIC BLOOD PRESSURE: 158 MMHG | HEIGHT: 65 IN | OXYGEN SATURATION: 99 % | DIASTOLIC BLOOD PRESSURE: 82 MMHG | WEIGHT: 165 LBS | BODY MASS INDEX: 27.49 KG/M2 | HEART RATE: 80 BPM

## 2024-07-09 DIAGNOSIS — E78.49 OTHER HYPERLIPIDEMIA: ICD-10-CM

## 2024-07-09 DIAGNOSIS — I49.3 PVC (PREMATURE VENTRICULAR CONTRACTION): ICD-10-CM

## 2024-07-09 DIAGNOSIS — R07.9 CHEST PAIN: ICD-10-CM

## 2024-07-09 DIAGNOSIS — Z87.891 EX-SMOKER: Primary | ICD-10-CM

## 2024-07-09 DIAGNOSIS — I10 ESSENTIAL HYPERTENSION: ICD-10-CM

## 2024-07-09 DIAGNOSIS — E11.65 TYPE 2 DIABETES MELLITUS WITH HYPERGLYCEMIA, WITHOUT LONG-TERM CURRENT USE OF INSULIN: ICD-10-CM

## 2024-07-09 PROCEDURE — 3288F FALL RISK ASSESSMENT DOCD: CPT | Mod: HCNC,CPTII,S$GLB, | Performed by: INTERNAL MEDICINE

## 2024-07-09 PROCEDURE — 3077F SYST BP >= 140 MM HG: CPT | Mod: HCNC,CPTII,S$GLB, | Performed by: INTERNAL MEDICINE

## 2024-07-09 PROCEDURE — 1101F PT FALLS ASSESS-DOCD LE1/YR: CPT | Mod: HCNC,CPTII,S$GLB, | Performed by: INTERNAL MEDICINE

## 2024-07-09 PROCEDURE — 1126F AMNT PAIN NOTED NONE PRSNT: CPT | Mod: HCNC,CPTII,S$GLB, | Performed by: INTERNAL MEDICINE

## 2024-07-09 PROCEDURE — 3079F DIAST BP 80-89 MM HG: CPT | Mod: HCNC,CPTII,S$GLB, | Performed by: INTERNAL MEDICINE

## 2024-07-09 PROCEDURE — 99204 OFFICE O/P NEW MOD 45 MIN: CPT | Mod: HCNC,S$GLB,, | Performed by: INTERNAL MEDICINE

## 2024-07-09 PROCEDURE — 1159F MED LIST DOCD IN RCRD: CPT | Mod: HCNC,CPTII,S$GLB, | Performed by: INTERNAL MEDICINE

## 2024-07-09 PROCEDURE — 99999 PR PBB SHADOW E&M-EST. PATIENT-LVL IV: CPT | Mod: PBBFAC,HCNC,, | Performed by: INTERNAL MEDICINE

## 2024-07-09 RX ORDER — LISINOPRIL 40 MG/1
40 TABLET ORAL DAILY
Qty: 90 TABLET | Refills: 3 | Status: SHIPPED | OUTPATIENT
Start: 2024-07-09

## 2024-07-09 NOTE — PROGRESS NOTES
Subjective:   Patient ID:  Patsy Perez is a 78 y.o. female who presents for evaluation of No chief complaint on file.      HPI  78-year-old female, ex-smoker, history as below.    Comes in for evaluation of recent discharge from the emergency room she states that she went in there for having chest tightness and palpitations.  Her symptoms were only short-lived.  She denies any chest pain on a regular basis with exertion.  She goes to the gym.    Denies any angina.    He has had she was told she was having some PVCs while in the emergency room.    She has switched to decaf tea.    Overall feels fine.    He is concerned as her father had history of multiple MIs and age of 50.      Past Medical History:   Diagnosis Date    CKD (chronic kidney disease)     DM II (diabetes mellitus, type II), controlled     Fatty liver     Gallstones     found on US    HLD (hyperlipidemia)     Hypertension     Hypothyroidism     s/p thyroidectomy       Past Surgical History:   Procedure Laterality Date    APPENDECTOMY      1985    CATARACT EXTRACTION      CHOLECYSTECTOMY  9/21/22    HYSTERECTOMY      2009 - Dr[facility]    LAPAROSCOPIC CHOLECYSTECTOMY N/A 09/21/2022    Procedure: CHOLECYSTECTOMY, LAPAROSCOPIC;  Surgeon: Roslyn Mckeon DO;  Location: [facility] OR;  Service: General;  Laterality: N/A;    OOPHORECTOMY      THYROIDECTOMY      1985       Social History     Tobacco Use    Smoking status: Former     Current packs/day: 0.75     Average packs/day: 0.8 packs/day for 25.0 years (18.8 ttl pk-yrs)     Types: Cigarettes    Smokeless tobacco: Never    Tobacco comments:     Quit over 30 yearsago   Substance Use Topics    Alcohol use: Not Currently     Comment: occ    Drug use: Never       Family History   Problem Relation Name Age of Onset    Cancer Mother Binta Fischer     Diabetes Mother Binta Fischer     Breast cancer Mother Binta Fischer         late 70s    Osteoarthritis Mother Binta  [place]         double knee replacement    Alzheimer's disease Mother Binta Neddluis miguel     Heart disease Father Spike Fischer     Alzheimer's disease Maternal Aunt x2     Alzheimer's disease Maternal Uncle      Alzheimer's disease Maternal Grandmother         Review of Systems   Cardiovascular:  Positive for chest pain. Negative for dyspnea on exertion, palpitations and syncope.   Genitourinary: Negative.    Neurological: Negative.        Current Outpatient Medications on File Prior to Visit   Medication Sig    amLODIPine (NORVASC) 10 MG tablet Take 1 tablet (10 mg total) by mouth once daily.    aspirin (ECOTRIN) 81 MG EC tablet Aspir-81 81 mg tablet,delayed release  Take 1 tablet every day by oral route for 30 days. (Patient taking differently: Take 81 mg by mouth every evening. Aspir-81 81 mg tablet,delayed release  Take 1 tablet every day by oral route for 30 days.)    atenoloL (TENORMIN) 50 MG tablet Take 1 tablet (50 mg total) by mouth once daily.    blood sugar diagnostic (ACCU-CHEK SMARTVIEW TEST STRIP) Strp Use to test blood glucose two (2) times daily as directed with insurance preferred meter and supplies    fluticasone propionate (FLONASE) 50 mcg/actuation nasal spray SHAKE LIQUID AND USE 1 SPRAY(50 MCG) IN EACH NOSTRIL EVERY DAY    glimepiride (AMARYL) 1 MG tablet Take 1 tablet (1 mg total) by mouth before breakfast.    lancets (ACCU-CHEK FASTCLIX LANCET DRUM) Misc Use to test blood glucose two (2) times daily as directed with insurance preferred meter and supplies    levothyroxine (SYNTHROID) 75 MCG tablet TAKE 1 TABLET BY MOUTH EVERY DAY    loperamide (IMODIUM A-D) 2 mg Tab Take 2 mg by mouth 4 (four) times daily as needed (ANTI DIARRHEAL).    metFORMIN (GLUCOPHAGE) 500 MG tablet Take 1 tablet (500 mg total) by mouth 2 (two) times daily with meals.    multivitamin (ONE DAILY MULTIVITAMIN) per tablet Take 1 tablet by mouth once daily.    pravastatin (PRAVACHOL) 80 MG tablet Take 1 tablet (80 mg  total) by mouth once daily.    RSVPreF3 antigen-AS01E, PF, (AREXVY, PF,) 120 mcg/0.5 mL SusR vaccine Inject into the muscle.    [DISCONTINUED] lisinopriL (PRINIVIL,ZESTRIL) 30 MG tablet Take 1 tablet (30 mg total) by mouth once daily.     No current facility-administered medications on file prior to visit.       Objective:   Objective:  Wt Readings from Last 3 Encounters:   07/09/24 74.9 kg (165 lb 0.2 oz)   07/03/24 74.4 kg (164 lb)   06/04/24 75.8 kg (167 lb 1.7 oz)     Temp Readings from Last 3 Encounters:   07/03/24 97.8 °F (36.6 °C) (Oral)   06/03/24 98.1 °F (36.7 °C) (Oral)   05/20/24 97.5 °F (36.4 °C) (Oral)     BP Readings from Last 3 Encounters:   07/09/24 (!) 158/82   07/03/24 (!) 147/69   06/04/24 (!) 164/72     Pulse Readings from Last 3 Encounters:   07/09/24 80   07/03/24 60   06/03/24 73       Physical Exam  Vitals reviewed.   Constitutional:       Appearance: She is well-developed.   Neck:      Vascular: No carotid bruit.   Cardiovascular:      Rate and Rhythm: Normal rate and regular rhythm.      Pulses: Intact distal pulses.      Heart sounds: Normal heart sounds. No murmur heard.  Pulmonary:      Breath sounds: Normal breath sounds.   Neurological:      Mental Status: She is oriented to person, place, and time.         Lab Results   Component Value Date    CHOL 134 05/06/2024    CHOL 125 03/15/2023    CHOL 133 02/22/2022    CHOL 133 02/22/2022     Lab Results   Component Value Date    HDL 55 05/06/2024    HDL 47 03/15/2023    HDL 52 02/22/2022    HDL 52 02/22/2022     Lab Results   Component Value Date    LDLCALC 54.0 (L) 05/06/2024    LDLCALC 53.0 (L) 03/15/2023    LDLCALC 52.8 (L) 02/22/2022    LDLCALC 52.8 (L) 02/22/2022     Lab Results   Component Value Date    TRIG 125 05/06/2024    TRIG 125 03/15/2023    TRIG 141 02/22/2022    TRIG 141 02/22/2022     Lab Results   Component Value Date    CHOLHDL 41.0 05/06/2024    CHOLHDL 37.6 03/15/2023    CHOLHDL 39.1 02/22/2022    CHOLHDL 39.1 02/22/2022  "      Chemistry        Component Value Date/Time     07/03/2024 0840    K 4.2 07/03/2024 0840     07/03/2024 0840    CO2 19 (L) 07/03/2024 0840    BUN 13 07/03/2024 0840    CREATININE 1.0 07/03/2024 0840     (H) 07/03/2024 0840        Component Value Date/Time    CALCIUM 9.2 07/03/2024 0840    ALKPHOS 60 07/03/2024 0840    AST 24 07/03/2024 0840    ALT 26 07/03/2024 0840    BILITOT 0.5 07/03/2024 0840    ESTGFRAFRICA 43 (A) 06/08/2022 0837    EGFRNONAA 37 (A) 06/08/2022 0837          Lab Results   Component Value Date    TSH 1.234 05/06/2024     No results found for: "INR", "PROTIME"  Lab Results   Component Value Date    WBC 8.29 07/03/2024    HGB 13.4 07/03/2024    HCT 40.3 07/03/2024    MCV 89 07/03/2024     07/03/2024     BNP  @LABRCNTIP(BNP,BNPTRIAGEBLO)@  Estimated Creatinine Clearance: 48.5 mL/min (based on SCr of 1 mg/dL).     Imaging:  ======    No results found for this or any previous visit.    No results found for this or any previous visit.    No results found for this or any previous visit.    No results found for this or any previous visit.    No valid procedures specified.    No results found for this or any previous visit.      No results found for this or any previous visit.      No results found for this or any previous visit.      Diagnostic Results:  ECG: Reviewed    The 10-year ASCVD risk score (Belem DK, et al., 2019) is: 53.6%    Values used to calculate the score:      Age: 78 years      Sex: Female      Is Non- : No      Diabetic: Yes      Tobacco smoker: No      Systolic Blood Pressure: 158 mmHg      Is BP treated: Yes      HDL Cholesterol: 55 mg/dL      Total Cholesterol: 134 mg/dL        Assessment and Plan:   Ex-smoker    Chest pain  -     Ambulatory referral/consult to Cardiology  -     Stress Echo Which stress agent will be used? Treadmill Exercise; Color Flow Doppler? No; Future    PVC (premature ventricular contraction)  -     " Ambulatory referral/consult to Cardiology    Essential hypertension  -     Ambulatory referral/consult to Cardiology  -     lisinopriL (PRINIVIL,ZESTRIL) 40 MG tablet; Take 1 tablet (40 mg total) by mouth once daily.  Dispense: 90 tablet; Refill: 3    Type 2 diabetes mellitus with hyperglycemia, without long-term current use of insulin    Other hyperlipidemia      BP not at goal.  Increase lisinopril from 30 mg to 40 mg.    May need to increase atenolol in the future from 50 mg to 100 mg.    We will get a stress echo.    Atypical chest pain.  Reviewed all tests and above medical conditions with patient in detail and formulated treatment plan.  Risk factor modification discussed.   Cardiac low salt diet discussed.  Maintaining healthy weight and weight loss goals were discussed in clinic.  She has chronic mild knee pain but she states that she can do the treadmill.  Follow up in  6 months

## 2024-07-17 ENCOUNTER — HOSPITAL ENCOUNTER (OUTPATIENT)
Dept: CARDIOLOGY | Facility: HOSPITAL | Age: 79
Discharge: HOME | End: 2024-07-17
Attending: INTERNAL MEDICINE
Payer: MEDICARE

## 2024-07-17 VITALS
DIASTOLIC BLOOD PRESSURE: 63 MMHG | HEIGHT: 65 IN | SYSTOLIC BLOOD PRESSURE: 126 MMHG | HEART RATE: 68 BPM | BODY MASS INDEX: 27.49 KG/M2 | WEIGHT: 165 LBS

## 2024-07-17 DIAGNOSIS — R07.9 CHEST PAIN: ICD-10-CM

## 2024-07-17 LAB
AORTIC ROOT ANNULUS: 2.8 CM
AV INDEX (PROSTH): 0.76
AV MEAN GRADIENT: 4 MMHG
AV PEAK GRADIENT: 8 MMHG
AV VALVE AREA BY VELOCITY RATIO: 2.44 CM²
AV VALVE AREA: 2.42 CM²
AV VELOCITY RATIO: 0.77
BSA FOR ECHO PROCEDURE: 1.85 M2
CV ECHO LV RWT: 0.5 CM
CV STRESS BASE HR: 68 BPM
DIASTOLIC BLOOD PRESSURE: 63 MMHG
DOP CALC AO PEAK VEL: 1.39 M/S
DOP CALC AO VTI: 36.1 CM
DOP CALC LVOT AREA: 3.2 CM2
DOP CALC LVOT DIAMETER: 2.01 CM
DOP CALC LVOT PEAK VEL: 1.07 M/S
DOP CALC LVOT STROKE VOLUME: 87.53 CM3
DOP CALC RVOT PEAK VEL: 0.78 M/S
DOP CALC RVOT VTI: 17.9 CM
DOP CALCLVOT PEAK VEL VTI: 27.6 CM
E WAVE DECELERATION TIME: 244.61 MSEC
E/A RATIO: 0.71
E/E' RATIO: 11.5 M/S
ECHO LV POSTERIOR WALL: 1.02 CM (ref 0.6–1.1)
FRACTIONAL SHORTENING: 28 % (ref 28–44)
INTERVENTRICULAR SEPTUM: 0.96 CM (ref 0.6–1.1)
IVRT: 128.45 MSEC
LA MAJOR: 3.73 CM
LA MINOR: 4.29 CM
LA WIDTH: 3.4 CM
LEFT ATRIUM AREA SYSTOLIC (APICAL 2 CHAMBER): 17.12 CM2
LEFT ATRIUM AREA SYSTOLIC (APICAL 4 CHAMBER): 16.4 CM2
LEFT ATRIUM SIZE: 3.16 CM
LEFT ATRIUM VOLUME INDEX MOD: 26.1 ML/M2
LEFT ATRIUM VOLUME INDEX: 20 ML/M2
LEFT ATRIUM VOLUME MOD: 47.57 CM3
LEFT ATRIUM VOLUME: 36.44 CM3
LEFT INTERNAL DIMENSION IN SYSTOLE: 2.93 CM (ref 2.1–4)
LEFT VENTRICLE DIASTOLIC VOLUME INDEX: 40.02 ML/M2
LEFT VENTRICLE DIASTOLIC VOLUME: 72.83 ML
LEFT VENTRICLE END SYSTOLIC VOLUME APICAL 2 CHAMBER: 44.7 ML
LEFT VENTRICLE END SYSTOLIC VOLUME APICAL 4 CHAMBER: 45.11 ML
LEFT VENTRICLE MASS INDEX: 71 G/M2
LEFT VENTRICLE SYSTOLIC VOLUME INDEX: 18.2 ML/M2
LEFT VENTRICLE SYSTOLIC VOLUME: 33.09 ML
LEFT VENTRICULAR INTERNAL DIMENSION IN DIASTOLE: 4.07 CM (ref 3.5–6)
LEFT VENTRICULAR MASS: 128.75 G
LV LATERAL E/E' RATIO: 10.22 M/S
LV SEPTAL E/E' RATIO: 13.14 M/S
LVED V (TEICH): 72.83 ML
LVES V (TEICH): 33.09 ML
LVOT MG: 2.37 MMHG
LVOT MV: 0.72 CM/S
MV PEAK A VEL: 1.3 M/S
MV PEAK E VEL: 0.92 M/S
MV STENOSIS PRESSURE HALF TIME: 70.94 MS
MV VALVE AREA P 1/2 METHOD: 3.1 CM2
OHS CV CPX 1 MINUTE RECOVERY HEART RATE: 102 BPM
OHS CV CPX 85 PERCENT MAX PREDICTED HEART RATE MALE: 122
OHS CV CPX ESTIMATED METS: 9
OHS CV CPX MAX PREDICTED HEART RATE: 144
OHS CV CPX PATIENT IS FEMALE: 1
OHS CV CPX PATIENT IS MALE: 0
OHS CV CPX PEAK DIASTOLIC BLOOD PRESSURE: 72 MMHG
OHS CV CPX PEAK HEAR RATE: 127 BPM
OHS CV CPX PEAK RATE PRESSURE PRODUCT: NORMAL
OHS CV CPX PEAK SYSTOLIC BLOOD PRESSURE: 213 MMHG
OHS CV CPX PERCENT MAX PREDICTED HEART RATE ACHIEVED: 91
OHS CV CPX RATE PRESSURE PRODUCT PRESENTING: 8364
OHS CV RV/LV RATIO: 0.8 CM
PISA TR MAX VEL: 2.15 M/S
PV MEAN GRADIENT: 1 MMHG
PV PEAK GRADIENT: 3 MMHG
PV PEAK VELOCITY: 0.87 M/S
RA MAJOR: 3.67 CM
RA PRESSURE ESTIMATED: 3 MMHG
RA WIDTH: 2.99 CM
RIGHT VENTRICULAR END-DIASTOLIC DIMENSION: 3.25 CM
RV TB RVSP: 5 MMHG
SINUS: 2.81 CM
STJ: 2.58 CM
STRESS ECHO POST EXERCISE DUR MIN: 6 MINUTES
STRESS ECHO POST EXERCISE DUR SEC: 33 SECONDS
SYSTOLIC BLOOD PRESSURE: 123 MMHG
TDI LATERAL: 0.09 M/S
TDI SEPTAL: 0.07 M/S
TDI: 0.08 M/S
TR MAX PG: 18 MMHG
TRICUSPID ANNULAR PLANE SYSTOLIC EXCURSION: 2.14 CM
TV REST PULMONARY ARTERY PRESSURE: 21 MMHG
Z-SCORE OF LEFT VENTRICULAR DIMENSION IN END DIASTOLE: -2.14
Z-SCORE OF LEFT VENTRICULAR DIMENSION IN END SYSTOLE: -0.48

## 2024-07-17 PROCEDURE — 93351 STRESS TTE COMPLETE: CPT | Mod: 26,HCNC,, | Performed by: INTERNAL MEDICINE

## 2024-07-17 PROCEDURE — 93351 STRESS TTE COMPLETE: CPT | Mod: HCNC

## 2024-07-22 ENCOUNTER — TELEPHONE (OUTPATIENT)
Dept: INTERNAL MEDICINE | Facility: CLINIC | Age: 79
End: 2024-07-22
Payer: MEDICARE

## 2024-07-22 DIAGNOSIS — Z12.31 ENCOUNTER FOR SCREENING MAMMOGRAM FOR MALIGNANT NEOPLASM OF BREAST: Primary | ICD-10-CM

## 2024-07-22 NOTE — TELEPHONE ENCOUNTER
----- Message from Madison Dailey sent at 7/22/2024 12:31 PM CDT -----  Contact: [phone]  Type:  Mammogram    Caller is requesting to schedule their annual mammogram appointment.  Order is not listed in EPIC.  Please enter order and contact patient to schedule.  Name of Caller:Patsy   Where would they like the mammogram performed?[facility]   Would the patient rather a call back or a response via [facility]? Call Back   Best Call Back Number:[phone]   Additional Information: n/a      Thanks KB

## 2024-08-09 ENCOUNTER — LAB VISIT (OUTPATIENT)
Dept: LAB | Facility: HOSPITAL | Age: 79
End: 2024-08-09
Attending: FAMILY MEDICINE
Payer: MEDICARE

## 2024-08-09 DIAGNOSIS — E11.65 TYPE 2 DIABETES MELLITUS WITH HYPERGLYCEMIA, WITHOUT LONG-TERM CURRENT USE OF INSULIN: ICD-10-CM

## 2024-08-09 LAB
ESTIMATED AVG GLUCOSE: 126 MG/DL (ref 68–131)
HBA1C MFR BLD: 6 % (ref 4–5.6)

## 2024-08-09 PROCEDURE — 83036 HEMOGLOBIN GLYCOSYLATED A1C: CPT | Mod: HCNC | Performed by: FAMILY MEDICINE

## 2024-08-09 PROCEDURE — 36415 COLL VENOUS BLD VENIPUNCTURE: CPT | Mod: HCNC | Performed by: FAMILY MEDICINE

## 2024-08-16 ENCOUNTER — HOSPITAL ENCOUNTER (OUTPATIENT)
Dept: RADIOLOGY | Facility: HOSPITAL | Age: 79
Discharge: HOME | End: 2024-08-16
Attending: FAMILY MEDICINE
Payer: MEDICARE

## 2024-08-16 VITALS — HEIGHT: 65 IN | WEIGHT: 163.13 LBS | BODY MASS INDEX: 27.18 KG/M2

## 2024-08-16 DIAGNOSIS — Z12.31 ENCOUNTER FOR SCREENING MAMMOGRAM FOR MALIGNANT NEOPLASM OF BREAST: ICD-10-CM

## 2024-08-16 PROCEDURE — 77067 SCR MAMMO BI INCL CAD: CPT | Mod: TC,HCNC

## 2024-08-16 PROCEDURE — 77063 BREAST TOMOSYNTHESIS BI: CPT | Mod: 26,HCNC,, | Performed by: RADIOLOGY

## 2024-08-16 PROCEDURE — 77067 SCR MAMMO BI INCL CAD: CPT | Mod: 26,HCNC,, | Performed by: RADIOLOGY

## 2024-08-17 NOTE — TELEPHONE ENCOUNTER
No care due was identified.  [facility] Embedded Care Due Messages. Reference number: [id number].   8/17/2024 1:43:05 PM CDT

## 2024-08-19 NOTE — TELEPHONE ENCOUNTER
Refill Routing Note   Medication(s) are not appropriate for processing by [facility] for the following reason(s):        ED/Hospital Visit since last OV with provider    [facility] action(s):  Defer      Medication Therapy Plan: MULTIPLE ED VISITS SINCE LAST OV    Extended chart review required: Yes     Appointments  past 12m or future 3m with PCP    Date Provider   Last Visit   5/9/2024 Christ Yepez MD   Next Visit   11/8/2024 Christ Yepez MD   ED visits in past 90 days: 1        Note composed:8:31 AM 08/19/2024

## 2024-08-20 RX ORDER — FLUTICASONE PROPIONATE 50 MCG
1 SPRAY, SUSPENSION (ML) NASAL
Qty: 32 G | Refills: 1 | Status: SHIPPED | OUTPATIENT
Start: 2024-08-20

## 2024-08-27 DIAGNOSIS — E11.8 CONTROLLED TYPE 2 DIABETES MELLITUS WITH COMPLICATION, WITHOUT LONG-TERM CURRENT USE OF INSULIN: ICD-10-CM

## 2024-08-27 RX ORDER — METFORMIN HYDROCHLORIDE 500 MG/1
500 TABLET ORAL 2 TIMES DAILY WITH MEALS
Qty: 180 TABLET | Refills: 1 | Status: SHIPPED | OUTPATIENT
Start: 2024-08-27

## 2024-08-27 NOTE — TELEPHONE ENCOUNTER
No care due was identified.  [facility] Embedded Care Due Messages. Reference number: [id number].   8/27/2024 7:29:17 AM CDT

## 2024-09-12 DIAGNOSIS — E11.8 CONTROLLED TYPE 2 DIABETES MELLITUS WITH COMPLICATION, WITHOUT LONG-TERM CURRENT USE OF INSULIN: ICD-10-CM

## 2024-09-12 NOTE — TELEPHONE ENCOUNTER
No care due was identified.  [facility] Embedded Care Due Messages. Reference number: [id number].   9/12/2024 5:39:53 PM CDT

## 2024-09-13 RX ORDER — LANCETS
EACH MISCELLANEOUS
Qty: 200 EACH | Refills: 3 | Status: SHIPPED | OUTPATIENT
Start: 2024-09-13

## 2024-09-13 NOTE — TELEPHONE ENCOUNTER
Refill Decision Note   Patsy Perez  is requesting a refill authorization.  Brief Assessment and Rationale for Refill:  Approve     Medication Therapy Plan:         Comments:     Note composed:12:25 PM 09/13/2024

## 2024-09-27 ENCOUNTER — TELEPHONE (OUTPATIENT)
Dept: INTERNAL MEDICINE | Facility: CLINIC | Age: 79
End: 2024-09-27
Payer: MEDICARE

## 2024-09-27 VITALS — DIASTOLIC BLOOD PRESSURE: 62 MMHG | SYSTOLIC BLOOD PRESSURE: 126 MMHG

## 2024-10-12 NOTE — TELEPHONE ENCOUNTER
No care due was identified.  [facility] Embedded Care Due Messages. Reference number: [id number].   10/12/2024 6:14:39 AM CDT

## 2024-10-13 ENCOUNTER — TELEPHONE (OUTPATIENT)
Dept: PHARMACY | Facility: CLINIC | Age: 79
End: 2024-10-13
Payer: MEDICARE

## 2024-10-14 RX ORDER — LISINOPRIL 40 MG/1
40 TABLET ORAL DAILY
Qty: 90 TABLET | Refills: 3 | Status: SHIPPED | OUTPATIENT
Start: 2024-10-14 | End: 2025-10-14

## 2024-10-14 RX ORDER — AMLODIPINE BESYLATE 10 MG/1
10 TABLET ORAL
Qty: 90 TABLET | Refills: 0 | Status: SHIPPED | OUTPATIENT
Start: 2024-10-14

## 2024-10-14 RX ORDER — PRAVASTATIN SODIUM 80 MG/1
80 TABLET ORAL
Qty: 90 TABLET | Refills: 3 | Status: SHIPPED | OUTPATIENT
Start: 2024-10-14

## 2024-10-14 NOTE — TELEPHONE ENCOUNTER
Refill Routing Note   Medication(s) are not appropriate for processing by [facility] for the following reason(s):        ED/Hospital Visit since last OV with provider    [facility] action(s):  Defer        Medication Therapy Plan: ED documentation reviewed. No changes to therapy note    Extended chart review required: Yes     Appointments  past 12m or future 3m with PCP    Date Provider   Last Visit   5/9/2024 Christ Yepez MD   Next Visit   10/13/2024 Christ Yepez MD   ED visits in past 90 days: 0        Note composed:8:20 AM 10/14/2024

## 2024-11-03 NOTE — H&P (VIEW-ONLY)
[facility] -   General Surgery History & Physical    SUBJECTIVE:     History of Present Illness:  Patient is a 76 y.o. female presents with episodes of right upper quadrant dull ache with yellow diarrhea.  The episodes occur after eating fried foods.  She has a history of choledocholithiasis.  HIDA scan was done which demonstrated a reduced gallbladder ejection fraction.      Review of patient's allergies indicates:  No Known Allergies    Current Outpatient Medications   Medication Sig Dispense Refill    amLODIPine (NORVASC) 10 MG tablet TAKE 1 TABLET BY MOUTH DAILY 90 tablet 1    ascorbic acid, vitamin C, (VITAMIN C) 250 MG tablet vitamin c 500mg      aspirin (ECOTRIN) 81 MG EC tablet Aspir-81 81 mg tablet,delayed release  Take 1 tablet every day by oral route for 30 days. 90 tablet 1    atenoloL (TENORMIN) 50 MG tablet Take 1 tablet (50 mg total) by mouth once daily. 90 tablet 3    blood sugar diagnostic (ACCU-CHEK SMARTVIEW TEST STRIP) Strp Use to test blood glucose two (2) times daily as directed with insurance preferred meter and supplies 200 strip 3    famotidine (PEPCID) 20 MG tablet Take 1 tablet (20 mg total) by mouth once daily at 6am. 30 tablet 11    fluticasone propionate (FLONASE) 50 mcg/actuation nasal spray SHAKE LIQUID AND USE 1 SPRAY(50 MCG) IN EACH NOSTRIL EVERY DAY 16 g 3    glimepiride (AMARYL) 2 MG tablet TAKE 1/2 TABLET BY MOUTH DAILY WITH BREAKFAST 90 tablet 0    lancets (ACCU-CHEK FASTCLIX LANCET DRUM) Misc Use to test blood glucose two (2) times daily as directed with insurance preferred meter and supplies 200 each 3    levothyroxine (SYNTHROID) 75 MCG tablet TAKE 1 TABLET BY MOUTH EVERY DAY 90 tablet 1    lisinopriL-hydrochlorothiazide (PRINZIDE,ZESTORETIC) 20-12.5 mg per tablet TAKE 2 TABLETS BY MOUTH EVERY  tablet 2    metFORMIN (GLUCOPHAGE) 500 MG tablet TAKE 1 TABLET BY MOUTH TWICE DAILY WITH MEALS 180 tablet 0    pravastatin (PRAVACHOL) 80 MG tablet TAKE 1 TABLET  BY MOUTH DAILY 90 tablet 3    SITagliptin (JANUVIA) 50 MG Tab Take 1 tablet (50 mg total) by mouth once daily. 90 tablet 0     No current facility-administered medications for this visit.       Past Medical History:   Diagnosis Date    CKD (chronic kidney disease)     DM II (diabetes mellitus, type II), controlled     Fatty liver     Gallstones     found on US    HLD (hyperlipidemia)     Hypertension     Hypothyroidism     s/p thyroidectomy     Past Surgical History:   Procedure Laterality Date    APPENDECTOMY      1985    HYSTERECTOMY      2009 - Dr. [facility]    THYROIDECTOMY      1985     Family History   Problem Relation Age of Onset    Diabetes Mother     Breast cancer Mother         late 70s    Osteoarthritis Mother         double knee replacement    Heart disease Father      Social History     Tobacco Use    Smoking status: Former    Smokeless tobacco: Never   Substance Use Topics    Alcohol use: Not Currently    Drug use: Never        Review of Systems:  Review of Systems   Constitutional:  Negative for fever.   Gastrointestinal:  Positive for abdominal pain and diarrhea. Negative for vomiting.     OBJECTIVE:     Vital Signs (Most Recent)  Blood pressure 139/67, pulse 68, temperature 98 °F (36.7 °C), temperature source Tympanic, weight 81 kg (178 lb 9.2 oz).          Physical Exam:  Physical Exam  Vitals and nursing note reviewed.   Constitutional:       General: She is not in acute distress.     Appearance: She is not ill-appearing, toxic-appearing or diaphoretic.   HENT:      Head: Normocephalic and atraumatic.      Nose: Nose normal.   Eyes:      General: No scleral icterus.        Right eye: No discharge.         Left eye: No discharge.      Extraocular Movements: Extraocular movements intact.   Cardiovascular:      Rate and Rhythm: Normal rate and regular rhythm.   Pulmonary:      Effort: Pulmonary effort is normal. No respiratory distress.      Breath sounds: No stridor.   Abdominal:       General: There is no distension.      Palpations: Abdomen is soft.      Tenderness: There is no abdominal tenderness. There is no guarding or rebound.   Musculoskeletal:      Cervical back: No rigidity.   Skin:     General: Skin is warm and dry.      Coloration: Skin is not jaundiced.   Neurological:      General: No focal deficit present.      Mental Status: She is alert and oriented to person, place, and time.   Psychiatric:         Mood and Affect: Mood normal.         Behavior: Behavior normal.       Laboratory  6/20/22 Hepatic functional panel WNL    Diagnostic Results:  EXAMINATION:  NM HEPATOBILIARY(HIDA) WITH PHARM AND EF     CLINICAL HISTORY:  RUQ abdominal pain, US nondiagnostic;  Right upper quadrant pain     TECHNIQUE:  Sequential camera imaging of the hepatobiliary system was performed following administration of 5.1 mCi of technetium 99m Choletec.  The gallbladder was stimulated with 8 oz of Novasource solution followed by 60 minutes of dynamic imaging and ejection fraction calculation.     COMPARISON:  None.     FINDINGS:  There is normal blood pool extraction of tracer by the liver.  The liver is normal in size, morphology, and tracer distribution.  There is prompt excretion of tracer into the biliary tract with subsequent normal transit into the small bowel.  The gallbladder is first visualized at approximately 22 minutes and fills normally thereafter.  The gallbladder ejection fraction is 30% at 60 minutes which is mildly decreased.     Impression:     Mildly decreased gallbladder ejection fraction consistent with gallbladder dyskinesis and possible chronic cholecystitis    ASSESSMENT/PLAN:     Patsy was seen today for consult.    Diagnoses and all orders for this visit:    Biliary dyskinesia  -     Case Request Operating Room: CHOLECYSTECTOMY, LAPAROSCOPIC  -     CBC Auto Differential; Future  -     COMPREHENSIVE METABOLIC PANEL; Future    Abnormal biliary HIDA scan  -     Ambulatory  referral/consult to General Surgery    RUQ pain  -     Ambulatory referral/consult to General Surgery       Will plan for laparoscopic cholecystectomy at the patient's earliest convenience.    After explaining the risks, benefits, and alternatives, patient verbalized understanding and would like to proceed with surgery. All questions were answered to their satisfaction.    Patient expressed understanding and is in agreement.      Roslyn Mckeon, DO  General Surgery  [facility]  8/30/2022     03-Nov-2024 05:00

## 2024-11-07 ENCOUNTER — OFFICE VISIT (OUTPATIENT)
Dept: INTERNAL MEDICINE | Facility: CLINIC | Age: 79
End: 2024-11-07
Payer: MEDICARE

## 2024-11-07 ENCOUNTER — LAB VISIT (OUTPATIENT)
Dept: LAB | Facility: HOSPITAL | Age: 79
End: 2024-11-07
Attending: FAMILY MEDICINE
Payer: MEDICARE

## 2024-11-07 VITALS
HEIGHT: 65 IN | BODY MASS INDEX: 28.65 KG/M2 | HEART RATE: 72 BPM | SYSTOLIC BLOOD PRESSURE: 138 MMHG | WEIGHT: 171.94 LBS | OXYGEN SATURATION: 97 % | DIASTOLIC BLOOD PRESSURE: 80 MMHG

## 2024-11-07 VITALS
HEART RATE: 72 BPM | WEIGHT: 171.94 LBS | DIASTOLIC BLOOD PRESSURE: 80 MMHG | OXYGEN SATURATION: 97 % | BODY MASS INDEX: 28.65 KG/M2 | HEIGHT: 65 IN | SYSTOLIC BLOOD PRESSURE: 138 MMHG | TEMPERATURE: 97 F

## 2024-11-07 DIAGNOSIS — D69.2 SENILE PURPURA: ICD-10-CM

## 2024-11-07 DIAGNOSIS — E11.69 HYPERLIPIDEMIA ASSOCIATED WITH TYPE 2 DIABETES MELLITUS: ICD-10-CM

## 2024-11-07 DIAGNOSIS — N18.31 STAGE 3A CHRONIC KIDNEY DISEASE: ICD-10-CM

## 2024-11-07 DIAGNOSIS — E11.8 CONTROLLED TYPE 2 DIABETES MELLITUS WITH COMPLICATION, WITHOUT LONG-TERM CURRENT USE OF INSULIN: ICD-10-CM

## 2024-11-07 DIAGNOSIS — J30.9 ALLERGIC RHINITIS, UNSPECIFIED SEASONALITY, UNSPECIFIED TRIGGER: ICD-10-CM

## 2024-11-07 DIAGNOSIS — E03.9 HYPOTHYROIDISM, UNSPECIFIED TYPE: ICD-10-CM

## 2024-11-07 DIAGNOSIS — I10 HYPERTENSION, UNSPECIFIED TYPE: ICD-10-CM

## 2024-11-07 DIAGNOSIS — Z00.00 ENCOUNTER FOR PREVENTIVE HEALTH EXAMINATION: Primary | ICD-10-CM

## 2024-11-07 DIAGNOSIS — Z79.899 ENCOUNTER FOR LONG-TERM (CURRENT) USE OF MEDICATIONS: ICD-10-CM

## 2024-11-07 DIAGNOSIS — Z79.899 ENCOUNTER FOR LONG-TERM (CURRENT) USE OF MEDICATIONS: Primary | ICD-10-CM

## 2024-11-07 DIAGNOSIS — E78.5 HYPERLIPIDEMIA, UNSPECIFIED HYPERLIPIDEMIA TYPE: ICD-10-CM

## 2024-11-07 DIAGNOSIS — E78.5 HYPERLIPIDEMIA ASSOCIATED WITH TYPE 2 DIABETES MELLITUS: ICD-10-CM

## 2024-11-07 LAB
ALBUMIN SERPL BCP-MCNC: 4 G/DL (ref 3.5–5.2)
ALP SERPL-CCNC: 62 U/L (ref 40–150)
ALT SERPL W/O P-5'-P-CCNC: 20 U/L (ref 10–44)
ANION GAP SERPL CALC-SCNC: 14 MMOL/L (ref 8–16)
AST SERPL-CCNC: 24 U/L (ref 10–40)
BILIRUB SERPL-MCNC: 0.5 MG/DL (ref 0.1–1)
BUN SERPL-MCNC: 17 MG/DL (ref 8–23)
CALCIUM SERPL-MCNC: 9.4 MG/DL (ref 8.7–10.5)
CHLORIDE SERPL-SCNC: 106 MMOL/L (ref 95–110)
CO2 SERPL-SCNC: 19 MMOL/L (ref 23–29)
CREAT SERPL-MCNC: 1 MG/DL (ref 0.5–1.4)
EST. GFR  (NO RACE VARIABLE): 58.4 ML/MIN/1.73 M^2
ESTIMATED AVG GLUCOSE: 134 MG/DL (ref 68–131)
GLUCOSE SERPL-MCNC: 97 MG/DL (ref 70–110)
HBA1C MFR BLD: 6.3 % (ref 4–5.6)
POTASSIUM SERPL-SCNC: 4.2 MMOL/L (ref 3.5–5.1)
PROT SERPL-MCNC: 7.6 G/DL (ref 6–8.4)
SODIUM SERPL-SCNC: 139 MMOL/L (ref 136–145)
TSH SERPL DL<=0.005 MIU/L-ACNC: 1.21 UIU/ML (ref 0.4–4)

## 2024-11-07 PROCEDURE — 99999 PR PBB SHADOW E&M-EST. PATIENT-LVL V: CPT | Mod: PBBFAC,HCNC,, | Performed by: NURSE PRACTITIONER

## 2024-11-07 PROCEDURE — 83036 HEMOGLOBIN GLYCOSYLATED A1C: CPT | Mod: HCNC | Performed by: FAMILY MEDICINE

## 2024-11-07 PROCEDURE — 80053 COMPREHEN METABOLIC PANEL: CPT | Mod: HCNC | Performed by: FAMILY MEDICINE

## 2024-11-07 PROCEDURE — 84443 ASSAY THYROID STIM HORMONE: CPT | Mod: HCNC | Performed by: FAMILY MEDICINE

## 2024-11-07 PROCEDURE — 99999 PR PBB SHADOW E&M-EST. PATIENT-LVL IV: CPT | Mod: PBBFAC,HCNC,, | Performed by: FAMILY MEDICINE

## 2024-11-07 RX ORDER — AMLODIPINE BESYLATE 10 MG/1
10 TABLET ORAL DAILY
Qty: 90 TABLET | Refills: 1 | Status: SHIPPED | OUTPATIENT
Start: 2024-11-07

## 2024-11-07 RX ORDER — ATENOLOL 50 MG/1
50 TABLET ORAL DAILY
Qty: 90 TABLET | Refills: 3 | Status: SHIPPED | OUTPATIENT
Start: 2024-11-07 | End: 2025-11-07

## 2024-11-07 NOTE — PROGRESS NOTES
"  Patsy Perez presented for a  Medicare AWV and comprehensive Health Risk Assessment today. The following components were reviewed and updated:    Medical history  Family History  Social history  Allergies and Current Medications  Health Risk Assessment  Health Maintenance  Care Team         ** See Completed Assessments for Annual Wellness Visit within the encounter summary.**         The following assessments were completed:  Living Situation  CAGE  Depression Screening  Timed Get Up and Go  Whisper Test  Cognitive Function Screening  Nutrition Screening  ADL Screening  PAQ Screening      Opioid documentation:      Patient does not have a current opioid prescription.        Vitals:    11/07/24 0904   BP: 138/80   Pulse: 72   SpO2: 97%   Weight: 78 kg (171 lb 15.3 oz)   Height: 5' 5" (1.651 m)     Body mass index is 28.62 kg/m².  Physical Exam  Vitals and nursing note reviewed.   Constitutional:       Appearance: She is well-developed.   HENT:      Head: Normocephalic.   Cardiovascular:      Rate and Rhythm: Normal rate and regular rhythm.      Heart sounds: Normal heart sounds.   Pulmonary:      Effort: Pulmonary effort is normal. No respiratory distress.      Breath sounds: Normal breath sounds.   Abdominal:      Palpations: Abdomen is soft. There is no mass.      Tenderness: There is no abdominal tenderness.   Musculoskeletal:         General: Normal range of motion.   Skin:     General: Skin is warm and dry.   Neurological:      Mental Status: She is alert and oriented to person, place, and time.      Motor: No abnormal muscle tone.   Psychiatric:         Speech: Speech normal.         Behavior: Behavior normal.               Diagnoses and health risks identified today and associated recommendations/orders:    1. Encounter for preventive health examination  Declines covid vaccine  Has urine leakage ever interrupted your daily activites or sleep? No  Do you think you could use some help to better manage urine " leakage?No   Encouraged healthy diet and exercise as tolerated   2. Hyperlipidemia associated with type 2 diabetes mellitus  A1c 6.0  Lipid-s/c  Continue current treatment plan as previously prescribed with your  pcp and cardiologist.     3. Stage 3a chronic kidney disease  Stable. Continue current treatment plan as previously prescribed with your  pcp     4. Senile purpura  Chronic   See pic for documentation  Advised to follow up with PCP for further evaluation and recommendations. Patient expressed understanding.       5. Hypertension, unspecified type  Stable. Continue current treatment plan as previously prescribed with your  pcp and cardiologist.     6. Hypothyroidism, unspecified type  Stable and controlled. Continue current treatment plan as previously prescribed with your PCP.        Provided Patsy with a 5-10 year written screening schedule and personal prevention plan. Recommendations were developed using the USPSTF age appropriate recommendations. Education, counseling, and referrals were provided as needed. After Visit Summary , available on [employer],  which includes a list of additional screenings\tests needed.    Follow up in about 1 year (around 11/7/2025) for awv.    Mirela Pabon NP  I offered to discuss advanced care planning, including how to pick a person who would make decisions for you if you were unable to make them for yourself, called a health care power of , and what kind of decisions you might make such as use of life sustaining treatments such as ventilators and tube feeding when faced with a life limiting illness recorded on a living will that they will need to know. (How you want to be cared for as you near the end of your natural life)     X  Patient has advanced directives written and agrees to provide copies to the institution.

## 2024-11-07 NOTE — PROGRESS NOTES
Subjective:       Patient ID: Patsy Perez is a 78 y.o. female.    Chief Complaint: Annual Exam    HPI    Patient Active Problem List   Diagnosis    Hypothyroidism    Hypertension    HLD (hyperlipidemia)    Gallstones    Fatty liver    Type 2 diabetes mellitus with hyperglycemia, without long-term current use of insulin    CKD (chronic kidney disease)    Piriformis syndrome of left side    Weakness    Posture imbalance    H/O sciatica    Diarrhea    Allergic rhinitis    Hyperlipidemia associated with type 2 diabetes mellitus    Stage 3a chronic kidney disease       Past Medical History:   Diagnosis Date    CKD (chronic kidney disease)     DM II (diabetes mellitus, type II), controlled     Fatty liver     Gallstones     found on US    HLD (hyperlipidemia)     Hypertension     Hypothyroidism     s/p thyroidectomy       Past Surgical History:   Procedure Laterality Date    APPENDECTOMY      1985    CATARACT EXTRACTION      CHOLECYSTECTOMY  9/21/22    [facility]    LAPAROSCOPIC CHOLECYSTECTOMY N/A 09/21/2022    Procedure: CHOLECYSTECTOMY, LAPAROSCOPIC;  Surgeon: Roslyn Mckeon DO;  Location: [facility];  Service: General;  Laterality: N/A;    OOPHORECTOMY      THYROIDECTOMY      1985       Family History   Problem Relation Name Age of Onset    Cancer Mother Binta Fischer     Diabetes Mother Binta Fischer     Breast cancer Mother Binta Fischer         late 70s    Osteoarthritis Mother Binta Fischer         double knee replacement    Alzheimer's disease Mother Binta Fischer     Heart disease Father Spike Fischer     Alzheimer's disease Maternal Aunt x2     Alzheimer's disease Maternal Uncle      Alzheimer's disease Maternal Grandmother         Social History     Tobacco Use   Smoking Status Former    Current packs/day: 0.75    Average packs/day: 0.8 packs/day for 25.0 years (18.8 ttl pk-yrs)    Types: Cigarettes   Smokeless Tobacco Never   Tobacco  Comments    Quit over 30 yearsago       Wt Readings from Last 5 Encounters:   11/07/24 78 kg (171 lb 15.3 oz)   11/07/24 78 kg (171 lb 15.3 oz)   08/16/24 74 kg (163 lb 2.3 oz)   07/17/24 74.8 kg (165 lb)   07/09/24 74.9 kg (165 lb 0.2 oz)     History of Present Illness    CHIEF COMPLAINT:  Patient presents for an annual wellness exam.    HPI:  Patient reports feeling well overall with no new health concerns. She had a past episode of chest discomfort, described as a sensation of fluttering rather than pain, lasting approximately 3 days before resolving. This led to an emergency room visit and subsequent cardiology consultation. Stress test done w/ no active concern and no further CP.  Patient has a history of premature ventricular contractions (PVCs) causing palpitations, effectively controlled with atenolol. In June, the patient consulted her gynecologist for vulvar irritation. A cream was prescribed, and the gynecologist suggested a possible reaction to laundry detergent or bath soap. Patient changed these products, resolving the issue without further cream use. Patient reports having undergone a thyroidectomy, necessitating Synthroid medication. She denies smoking, excessive alcohol consumption, illicit drug use, current vaginal issues, vulvar irritation, and bleeding issues related to aspirin use.    MEDICATIONS:  Patient is on Lisinopril 40 mg, Amlodipine 10 mg, and Atenolol 50 mg daily for hypertension management, with Atenolol also addressing PVCs. She takes Metformin 500 mg twice daily and Glipizide 1 mg daily for type 2 diabetes. Pravastatin 80 mg is taken daily for hyperlipidemia. Patient is on Synthroid 75 mcg daily for hypothyroidism due to having no thyroid. She takes Aspirin 81 mg daily. Flonase is used as needed for nasal congestion/allergic rhinitis, and Imodium is taken as needed for diarrhea, but no more than daily.    MEDICAL HISTORY:  Patient has a history of hypertension, type 2 diabetes,  hyperlipidemia, and hypothyroidism due to having no thyroid. She also has premature ventricular contractions (PVCs) and chronic kidney disease stage 3A. Patient's last pelvic exam was in  when she saw a gynecologist and discussed vulvar irritation. She is not using any contraception. Her obstetric history is , and she has a positive lactation history.    SURGICAL HISTORY:  Patient has undergone a complete thyroidectomy. She had a colonoscopy last month where 3 polyps were found, with a follow-up scheduled for next year.    TEST RESULTS:  In August, the patient's A1C was 6.0, indicating well-controlled diabetes. Her urine microalbumin test in August showed a somewhat elevated microalbumin creatinine ratio. A comprehensive metabolic panel in July demonstrated a persistent mild decline in renal function, consistent with stable chronic kidney disease stage 3A. A complete blood count in July showed no active concerns. Her lipid panel in May revealed an LDL of 54, indicating well-controlled lipids. Thyroid function tests in May showed good results. Patient underwent a stress echocardiogram on . The results looked fine and did not require any change in treatment.    IMAGING:  A mammogram was performed in August, showing no evidence of malignancy.    SOCIAL HISTORY:  Patient does not smoke or use illicit drugs. She does not consume excessive amounts of alcohol.         Objective:      Vitals:    24 0850   BP: 138/80   Pulse: 72   Temp: 96.9 °F (36.1 °C)       Physical Exam  Constitutional:       General: She is not in acute distress.     Appearance: She is not ill-appearing.   Pulmonary:      Effort: Pulmonary effort is normal. No respiratory distress.   Neurological:      General: No focal deficit present.      Mental Status: She is alert.   Psychiatric:         Mood and Affect: Mood normal.         Behavior: Behavior normal.         Assessment:       1. Encounter for long-term (current) use of  medications    2. Stage 3a chronic kidney disease    3. Hyperlipidemia, unspecified hyperlipidemia type    4. Hypertension, unspecified type    5. Controlled type 2 diabetes mellitus with complication, without long-term current use of insulin    6. Hypothyroidism, unspecified type    7. Allergic rhinitis, unspecified seasonality, unspecified trigger        Plan:   Assessment & Plan      - Assessed hypertension as controlled with current regimen of lisinopril 40mg, amlodipine 10mg, and atenolol 50mg  - Reviewed July stress echocardiogram results, which were normal and required no treatment changes  - Noted resolution of previous vulvar irritation after patient changed laundry detergent and bath soap  - Evaluated diabetes control with A1C of 6.0%, deciding to maintain current medication regimen  - Identified elevated microalbumin creatinine ratio, emphasizing importance of continuing lisinopril for kidney protection  - Monitored chronic kidney disease stage 3A, noting stability but emphasizing avoidance of NSAIDs  - Reviewed lipid panel, noting well-controlled LDL at 54  - Will recheck thyroid function due to biannual monitoring protocol for hypothyroidism    HYPERTENSION:  - Educated on potential side effects of lisinopril, including swelling of lips and mouth, advising to stop and seek medical attention if occurs.  - Discussed possible side effects of amlodipine, including swelling in hands and feet.  - Explained potential side effects of atenolol, including slowed heart rate, dizziness, and lightheadedness.  - Continued lisinopril 40mg daily for hypertension management.  - Continued amlodipine 10mg daily for hypertension management.  - Refilled with added refills.  - Continued atenolol 50mg daily for hypertension management and PVC control.  - Refilled for 1 year supply.    DIABETES:  - Continued glipizide 1mg daily for diabetes management.  - Continued metformin 500mg twice daily for diabetes management.  - A1C  test ordered to ensure consistent diabetes control.    HYPOTHYROIDISM:  - Continued Synthroid 75mcg daily for hypothyroidism.  - TSH test ordered to monitor thyroid function.    HYPERLIPIDEMIA:  - Continued pravastatin 80mg daily for hyperlipidemia management.    ALLERGIC RHINITIS:  - Continued Flonase for allergic rhinitis.    KIDNEY FUNCTION:  - Patient to avoid use of ibuprofen and other nonsteroidal anti-inflammatory drugs due to kidney function.  - Comprehensive metabolic panel ordered to monitor kidney function.    DIARRHEA: periodic rare issue  - Continued Imodium as needed for diarrhea, not to exceed daily use.    EXERCISE REGIMEN:  - Patient to continue current exercise regimen of going to the gym, riding bikes, and lifting weights at least 3 times per week.    MEDICATIONS/SUPPLEMENTS:  - Continued aspirin 81mg daily.    FOLLOW UP:  - Follow up in 6 months for comprehensive evaluation.  - Follow up next August for repeat mammogram.           This note was verbally dictated, please excuse any type errors.

## 2024-11-07 NOTE — PATIENT INSTRUCTIONS
Counseling and Referral of Other Preventative  (Italic type indicates deductible and co-insurance are waived)    Patient Name: Patsy Perez  Today's Date: 11/7/2024    Health Maintenance       Date Due Completion Date    COVID-19 Vaccine (3 - 2024-25 season) 11/07/2025 (Originally 9/1/2024) 1/31/2021    Hemoglobin A1c 02/09/2025 8/9/2024    Eye Exam 03/18/2025 3/18/2024    Lipid Panel 05/06/2025 5/6/2024    Diabetes Urine Screening 08/09/2025 8/9/2024    DEXA Scan 11/10/2025 11/10/2022    TETANUS VACCINE 05/18/2028 5/18/2018        No orders of the defined types were placed in this encounter.    The following information is provided to all patients.  This information is to help you find resources for any of the problems found today that may be affecting your health:                  Living healthy guide: www.[facility].[place].[facility]      Understanding Diabetes: www.diabetes.org      Eating healthy: www.cdc.gov/healthyweight      CDC home safety checklist: www.cdc.gov/steadi/patient.html      Agency on Aging: www.goea.[place].[facility]      Alcoholics anonymous (AA): www.aa.org      Physical Activity: www.vernon.nih.gov/ne5kprk      Tobacco use: www.quitwithusla.org

## 2024-12-09 ENCOUNTER — PATIENT OUTREACH (OUTPATIENT)
Dept: ADMINISTRATIVE | Facility: HOSPITAL | Age: 79
End: 2024-12-09
Payer: MEDICARE

## 2024-12-09 NOTE — PROGRESS NOTES
AMBAR sent to [facility] for COLONOSCOPY with Pathology and recall date.  Reminder set x 3 weeks.

## 2024-12-09 NOTE — LETTER
[id number]    AUTHORIZATION FOR RELEASE OF   CONFIDENTIAL INFORMATION    Dear Dr. Link,    We are seeing Patsy Perez, date of birth 1945, in the clinic at [facility]. Christ Yepez MD is the patient's PCP. Patsy Perez has an outstanding lab/procedure at the time we reviewed her chart. In order to help keep her health information updated, she has authorized us to request the following medical record(s):           ( x ) 2024 COLON RECALL RECOMMENDATION     ** Repeat colonoscopy in ______ years           Please fax/email records to:  Fax #: [phone]  Email: [email or web address]     If you have any questions, please contact    GIACOMO Velasquez at [phone]          Patient Name: Patsy Perez  : 1945  Patient Phone #: [phone]

## 2024-12-09 NOTE — LETTER
AUTHORIZATION FOR RELEASE OF   CONFIDENTIAL INFORMATION    Dear Fariba,    We are seeing Patsy Perez, date of birth 1945, in the clinic at [facility]. Christ Yepez MD is the patient's PCP. Patsy Perez has an outstanding lab/procedure at the time we reviewed her chart. In order to help keep her health information updated, she has authorized us to request the following medical record(s):        (  )  COLONOSCOPY with Pathology and recall date      (  )  PAP SMEAR                                          (  )  OUTSIDE LAB RESULTS     (  )  DEXA SCAN                                          (  )  EYE EXAM            (  )  FOOT EXAM                                          (  )  ENTIRE RECORD     (  )  OUTSIDE IMMUNIZATIONS                 (  )  _______________         Please fax records to [facility], [facility] Care Coordination @ [phone].   If you have any questions, please contact Lyndsey Hernandez [facility] Care Coordinator  [phone]            Patient Name: Patsy Perez  : 1945  Patient Phone #: [phone]

## 2024-12-10 NOTE — PROGRESS NOTES
Manually uploaded COLONOSCOPY & PATHOLOGY 8/22/2024 to media. No recall recommendation listed.   AMBAR faxed to Dr. Link 1x to request colon recall recommendation. Reminder set.

## 2025-02-05 ENCOUNTER — OFFICE VISIT (OUTPATIENT)
Dept: CARDIOLOGY | Facility: CLINIC | Age: 80
End: 2025-02-05
Payer: MEDICARE

## 2025-02-05 VITALS
DIASTOLIC BLOOD PRESSURE: 68 MMHG | BODY MASS INDEX: 28.32 KG/M2 | SYSTOLIC BLOOD PRESSURE: 170 MMHG | OXYGEN SATURATION: 99 % | WEIGHT: 170.19 LBS | HEART RATE: 71 BPM

## 2025-02-05 DIAGNOSIS — I10 WHITE COAT SYNDROME WITH DIAGNOSIS OF HYPERTENSION: ICD-10-CM

## 2025-02-05 DIAGNOSIS — E11.65 TYPE 2 DIABETES MELLITUS WITH HYPERGLYCEMIA, WITHOUT LONG-TERM CURRENT USE OF INSULIN: ICD-10-CM

## 2025-02-05 DIAGNOSIS — Z87.891 EX-SMOKER: ICD-10-CM

## 2025-02-05 DIAGNOSIS — I49.3 PVC (PREMATURE VENTRICULAR CONTRACTION): Primary | ICD-10-CM

## 2025-02-05 DIAGNOSIS — E78.49 OTHER HYPERLIPIDEMIA: ICD-10-CM

## 2025-02-05 DIAGNOSIS — I10 ESSENTIAL HYPERTENSION: ICD-10-CM

## 2025-02-05 PROCEDURE — 1101F PT FALLS ASSESS-DOCD LE1/YR: CPT | Mod: HCNC,CPTII,S$GLB, | Performed by: INTERNAL MEDICINE

## 2025-02-05 PROCEDURE — 99214 OFFICE O/P EST MOD 30 MIN: CPT | Mod: HCNC,S$GLB,, | Performed by: INTERNAL MEDICINE

## 2025-02-05 PROCEDURE — 3288F FALL RISK ASSESSMENT DOCD: CPT | Mod: HCNC,CPTII,S$GLB, | Performed by: INTERNAL MEDICINE

## 2025-02-05 PROCEDURE — 3078F DIAST BP <80 MM HG: CPT | Mod: HCNC,CPTII,S$GLB, | Performed by: INTERNAL MEDICINE

## 2025-02-05 PROCEDURE — 3077F SYST BP >= 140 MM HG: CPT | Mod: HCNC,CPTII,S$GLB, | Performed by: INTERNAL MEDICINE

## 2025-02-05 PROCEDURE — 1159F MED LIST DOCD IN RCRD: CPT | Mod: HCNC,CPTII,S$GLB, | Performed by: INTERNAL MEDICINE

## 2025-02-05 PROCEDURE — 1126F AMNT PAIN NOTED NONE PRSNT: CPT | Mod: HCNC,CPTII,S$GLB, | Performed by: INTERNAL MEDICINE

## 2025-02-05 PROCEDURE — 99999 PR PBB SHADOW E&M-EST. PATIENT-LVL IV: CPT | Mod: PBBFAC,HCNC,, | Performed by: INTERNAL MEDICINE

## 2025-02-05 RX ORDER — HYDROCHLOROTHIAZIDE 12.5 MG/1
12.5 TABLET ORAL
Qty: 36 TABLET | Refills: 3 | Status: SHIPPED | OUTPATIENT
Start: 2025-02-05 | End: 2026-02-05

## 2025-02-05 NOTE — PROGRESS NOTES
Subjective:   Patient ID:  Patsy Perez is a 79 y.o. female who presents for evaluation of Follow-up      Follow-up  Associated symptoms include chest pain.     2.5.2025  Comes in for six-month visit.    Denies any significant SYED, angina, palpitations, lower extremity swelling syncope or presyncope   Compliant with medications.    Stress echo normal 7.2024  Exercises regularly    7.2024  78-year-old female, ex-smoker, history as below.    Comes in for evaluation of recent discharge from the emergency room she states that she went in there for having chest tightness and palpitations.  Her symptoms were only short-lived.  She denies any chest pain on a regular basis with exertion.  She goes to the gym.    Denies any angina.    He has had she was told she was having some PVCs while in the emergency room.    She has switched to decaf tea.    Overall feels fine.    He is concerned as her father had history of multiple MIs and age of 50.      Past Medical History:   Diagnosis Date    CKD (chronic kidney disease)     DM II (diabetes mellitus, type II), controlled     Fatty liver     Gallstones     found on US    HLD (hyperlipidemia)     Hypertension     Hypothyroidism     s/p thyroidectomy       Past Surgical History:   Procedure Laterality Date    APPENDECTOMY      1985    CATARACT EXTRACTION      CHOLECYSTECTOMY  9/21/22    HYSTERECTOMY      2009 - Dr. [facility]    LAPAROSCOPIC CHOLECYSTECTOMY N/A 09/21/2022    Procedure: CHOLECYSTECTOMY, LAPAROSCOPIC;  Surgeon: Roslyn Mckeon DO;  Location: [facility];  Service: General;  Laterality: N/A;    OOPHORECTOMY      THYROIDECTOMY      1985       Social History     Tobacco Use    Smoking status: Former     Current packs/day: 0.75     Average packs/day: 0.8 packs/day for 25.0 years (18.8 ttl pk-yrs)     Types: Cigarettes    Smokeless tobacco: Never    Tobacco comments:     Quit over 30 yearsago   Substance Use Topics    Alcohol use: Not Currently      Comment: occ    Drug use: Never       Family History   Problem Relation Name Age of Onset    Cancer Mother Binta Fischer     Diabetes Mother Binta Fischer     Breast cancer Mother Binta Fischer         late 70s    Osteoarthritis Mother Binta Fischer         double knee replacement    Alzheimer's disease Mother Binta Fischer     Heart disease Father Spike Fischer     Alzheimer's disease Maternal Aunt x2     Alzheimer's disease Maternal Uncle      Alzheimer's disease Maternal Grandmother         Review of Systems   Cardiovascular:  Positive for chest pain. Negative for dyspnea on exertion, palpitations and syncope.   Genitourinary: Negative.    Neurological: Negative.        Current Outpatient Medications on File Prior to Visit   Medication Sig    amLODIPine (NORVASC) 10 MG tablet Take 1 tablet (10 mg total) by mouth once daily.    aspirin (ECOTRIN) 81 MG EC tablet Aspir-81 81 mg tablet,delayed release  Take 1 tablet every day by oral route for 30 days.    atenoloL (TENORMIN) 50 MG tablet Take 1 tablet (50 mg total) by mouth once daily.    blood sugar diagnostic (ACCU-CHEK SMARTVIEW TEST STRIP) Strp Use to test blood glucose two (2) times daily as directed with insurance preferred meter and supplies    fluticasone propionate (FLONASE) 50 mcg/actuation nasal spray SHAKE LIQUID AND USE 1 SPRAY IN EACH NOSTRIL EVERY DAY    glimepiride (AMARYL) 1 MG tablet Take 1 tablet (1 mg total) by mouth before breakfast.    lancets (ACCU-CHEK FASTCLIX LANCET DRUM) Misc Use to test blood glucose two (2) times daily as directed with insurance preferred meter and supplies    levothyroxine (SYNTHROID) 75 MCG tablet TAKE 1 TABLET BY MOUTH EVERY DAY    lisinopriL (PRINIVIL,ZESTRIL) 40 MG tablet Take 1 tablet (40 mg total) by mouth once daily.    loperamide (IMODIUM A-D) 2 mg Tab Take 2 mg by mouth 4 (four) times daily as needed (ANTI DIARRHEAL).    metFORMIN (GLUCOPHAGE) 500 MG tablet Take 1 tablet (500 mg total) by  mouth 2 (two) times daily with meals.    multivitamin (ONE DAILY MULTIVITAMIN) per tablet Take 1 tablet by mouth once daily.    pravastatin (PRAVACHOL) 80 MG tablet TAKE 1 TABLET(80 MG) BY MOUTH EVERY DAY     No current facility-administered medications on file prior to visit.       Objective:   Objective:  Wt Readings from Last 3 Encounters:   02/05/25 77.2 kg (170 lb 3.1 oz)   11/07/24 78 kg (171 lb 15.3 oz)   11/07/24 78 kg (171 lb 15.3 oz)     Temp Readings from Last 3 Encounters:   11/07/24 96.9 °F (36.1 °C) (Tympanic)   07/03/24 97.8 °F (36.6 °C) (Oral)   06/03/24 98.1 °F (36.7 °C) (Oral)     BP Readings from Last 3 Encounters:   02/05/25 (!) 170/68   11/07/24 138/80   11/07/24 138/80     Pulse Readings from Last 3 Encounters:   02/05/25 71   11/07/24 72   11/07/24 72       Physical Exam  Vitals reviewed.   Constitutional:       Appearance: She is well-developed.   Neck:      Vascular: No carotid bruit.   Cardiovascular:      Rate and Rhythm: Normal rate and regular rhythm.      Pulses: Intact distal pulses.      Heart sounds: Normal heart sounds. No murmur heard.  Pulmonary:      Breath sounds: Normal breath sounds.   Neurological:      Mental Status: She is oriented to person, place, and time.         Lab Results   Component Value Date    CHOL 134 05/06/2024    CHOL 125 03/15/2023    CHOL 133 02/22/2022    CHOL 133 02/22/2022     Lab Results   Component Value Date    HDL 55 05/06/2024    HDL 47 03/15/2023    HDL 52 02/22/2022    HDL 52 02/22/2022     Lab Results   Component Value Date    LDLCALC 54.0 (L) 05/06/2024    LDLCALC 53.0 (L) 03/15/2023    LDLCALC 52.8 (L) 02/22/2022    LDLCALC 52.8 (L) 02/22/2022     Lab Results   Component Value Date    TRIG 125 05/06/2024    TRIG 125 03/15/2023    TRIG 141 02/22/2022    TRIG 141 02/22/2022     Lab Results   Component Value Date    CHOLHDL 41.0 05/06/2024    CHOLHDL 37.6 03/15/2023    CHOLHDL 39.1 02/22/2022    CHOLHDL 39.1 02/22/2022       Chemistry       "  Component Value Date/Time     11/07/2024 1024    K 4.2 11/07/2024 1024     11/07/2024 1024    CO2 19 (L) 11/07/2024 1024    BUN 17 11/07/2024 1024    CREATININE 1.0 11/07/2024 1024    GLU 97 11/07/2024 1024        Component Value Date/Time    CALCIUM 9.4 11/07/2024 1024    ALKPHOS 62 11/07/2024 1024    AST 24 11/07/2024 1024    ALT 20 11/07/2024 1024    BILITOT 0.5 11/07/2024 1024    ESTGFRAFRICA 43 (A) 06/08/2022 0837    EGFRNONAA 37 (A) 06/08/2022 0837          Lab Results   Component Value Date    TSH 1.211 11/07/2024     No results found for: "INR", "PROTIME"  Lab Results   Component Value Date    WBC 8.29 07/03/2024    HGB 13.4 07/03/2024    HCT 40.3 07/03/2024    MCV 89 07/03/2024     07/03/2024     BNP  @LABRCNTIP(BNP,BNPTRIAGEBLO)@  CrCl cannot be calculated (Patient's most recent lab result is older than the maximum 7 days allowed.).     Imaging:  ======    No results found for this or any previous visit.    No results found for this or any previous visit.    No results found for this or any previous visit.    No results found for this or any previous visit.    No valid procedures specified.    No results found for this or any previous visit.      No results found for this or any previous visit.      No results found for this or any previous visit.      Diagnostic Results:  ECG: Reviewed    The 10-year ASCVD risk score (Belem DK, et al., 2019) is: 63.7%    Values used to calculate the score:      Age: 79 years      Sex: Female      Is Non- : No      Diabetic: Yes      Tobacco smoker: No      Systolic Blood Pressure: 170 mmHg      Is BP treated: Yes      HDL Cholesterol: 55 mg/dL      Total Cholesterol: 134 mg/dL    7.2024  Summary  Show Result Comparison     Left Ventricle: The left ventricle is normal in size. Normal wall thickness. There is concentric remodeling. There is normal systolic function with a visually estimated ejection fraction of 65 - 70%. There " is normal diastolic function.    Right Ventricle: Normal right ventricular cavity size. Wall thickness is normal. Systolic function is normal.    Left Atrium: Left atrium is dilated.    Pulmonary Artery: The estimated pulmonary artery systolic pressure is 21 mmHg.    IVC/SVC: Normal venous pressure at 3 mmHg.    Stress Protocol: The patient exercised for 6 minutes 33 seconds on a Rebel protocol, corresponding to a functional capacity of 9METS, achieving a peak heart rate of 127 bpm, which is 91% of the age predicted maximum heart rate. The patient experienced no angina during the test. Their exercise capacity was normal. The patient reported no symptoms during the stress test. The test was stopped because the patient requested it and they experienced fatigue and leg fatigue. Target heart rate achieved.    Baseline ECG: The Baseline ECG reveals sinus rhythm. The axis is normal. The ST segments are normal.    Stress ECG: There are no ST segment deviation identified during the protocol. There are no arrhythmias during stress.    ECG Conclusion: The ECG portion of the study is negative for ischemia.    Post-stress Impression: The study is negative with no echocardiographic evidence of stress induced ischemia.    Assessment and Plan:   PVC (premature ventricular contraction)    Type 2 diabetes mellitus with hyperglycemia, without long-term current use of insulin    Ex-smoker    Essential hypertension  -     hydroCHLOROthiazide 12.5 MG Tab; Take 1 tablet (12.5 mg total) by mouth every Mon, Wed, Fri.  Dispense: 36 tablet; Refill: 3    Other hyperlipidemia    White coat syndrome with diagnosis of hypertension      Continue with lisinopril 40 mg.    Add HCTZ 3 days a week.  Normal stress echo.    Atypical chest pain resolved  Reviewed all tests and above medical conditions with patient in detail and formulated treatment plan.  Risk factor modification discussed.   Cardiac low salt diet discussed.  Maintaining healthy weight  and weight loss goals were discussed in clinic.    Follow up in  6 months

## 2025-02-13 RX ORDER — LEVOTHYROXINE SODIUM 75 UG/1
TABLET ORAL
Qty: 90 TABLET | Refills: 2 | Status: SHIPPED | OUTPATIENT
Start: 2025-02-13

## 2025-02-14 NOTE — TELEPHONE ENCOUNTER
Provider Staff:  Action required for this patient    Requires labs      Please see care gap opportunities below in Care Due Message.    Thanks!  [facility]     Appointments      Date Provider   Last Visit   11/7/2024 Christ Yepez MD   Next Visit   5/9/2025 Christ Yepez MD     Refill Decision Note   Patsy Perez  is requesting a refill authorization.  Brief Assessment and Rationale for Refill:  Approve     Medication Therapy Plan:  FOVS; TSH-WNL      Comments:     Note composed:7:24 PM 02/13/2025

## 2025-03-26 RX ORDER — FLUTICASONE PROPIONATE 50 MCG
1 SPRAY, SUSPENSION (ML) NASAL
Qty: 32 G | Refills: 1 | Status: SHIPPED | OUTPATIENT
Start: 2025-03-26

## 2025-03-26 NOTE — TELEPHONE ENCOUNTER
No care due was identified.  [facility] Embedded Care Due Messages. Reference number: [id number].   3/26/2025 9:30:15 AM CDT

## 2025-03-26 NOTE — TELEPHONE ENCOUNTER
Refill Decision Note   Patsy Perez  is requesting a refill authorization.  Brief Assessment and Rationale for Refill:  Approve     Medication Therapy Plan:        Comments:     Note composed:11:02 AM 03/26/2025

## 2025-04-15 ENCOUNTER — PATIENT MESSAGE (OUTPATIENT)
Dept: NEUROLOGY | Facility: CLINIC | Age: 80
End: 2025-04-15
Payer: MEDICARE

## 2025-05-09 ENCOUNTER — OFFICE VISIT (OUTPATIENT)
Dept: INTERNAL MEDICINE | Facility: CLINIC | Age: 80
End: 2025-05-09
Payer: MEDICARE

## 2025-05-09 VITALS
HEART RATE: 80 BPM | TEMPERATURE: 98 F | BODY MASS INDEX: 28.36 KG/M2 | DIASTOLIC BLOOD PRESSURE: 72 MMHG | SYSTOLIC BLOOD PRESSURE: 132 MMHG | RESPIRATION RATE: 20 BRPM | WEIGHT: 170.19 LBS | HEIGHT: 65 IN | OXYGEN SATURATION: 97 %

## 2025-05-09 DIAGNOSIS — I10 HYPERTENSION, UNSPECIFIED TYPE: ICD-10-CM

## 2025-05-09 DIAGNOSIS — E78.5 HYPERLIPIDEMIA ASSOCIATED WITH TYPE 2 DIABETES MELLITUS: ICD-10-CM

## 2025-05-09 DIAGNOSIS — E78.5 HYPERLIPIDEMIA, UNSPECIFIED HYPERLIPIDEMIA TYPE: ICD-10-CM

## 2025-05-09 DIAGNOSIS — E03.9 HYPOTHYROIDISM, UNSPECIFIED TYPE: Primary | ICD-10-CM

## 2025-05-09 DIAGNOSIS — N18.31 STAGE 3A CHRONIC KIDNEY DISEASE: ICD-10-CM

## 2025-05-09 DIAGNOSIS — E11.8 CONTROLLED TYPE 2 DIABETES MELLITUS WITH COMPLICATION, WITHOUT LONG-TERM CURRENT USE OF INSULIN: ICD-10-CM

## 2025-05-09 DIAGNOSIS — R19.7 DIARRHEA, UNSPECIFIED TYPE: ICD-10-CM

## 2025-05-09 DIAGNOSIS — E11.69 HYPERLIPIDEMIA ASSOCIATED WITH TYPE 2 DIABETES MELLITUS: ICD-10-CM

## 2025-05-09 PROCEDURE — 99999 PR PBB SHADOW E&M-EST. PATIENT-LVL IV: CPT | Mod: PBBFAC,HCNC,, | Performed by: FAMILY MEDICINE

## 2025-05-09 RX ORDER — FLUTICASONE PROPIONATE 50 MCG
1 SPRAY, SUSPENSION (ML) NASAL DAILY
Qty: 32 G | Refills: 11 | Status: SHIPPED | OUTPATIENT
Start: 2025-05-09

## 2025-05-09 RX ORDER — AMLODIPINE BESYLATE 10 MG/1
10 TABLET ORAL DAILY
Qty: 90 TABLET | Refills: 1 | Status: SHIPPED | OUTPATIENT
Start: 2025-05-09

## 2025-05-09 RX ORDER — METFORMIN HYDROCHLORIDE 500 MG/1
500 TABLET ORAL 2 TIMES DAILY WITH MEALS
Qty: 180 TABLET | Refills: 1 | Status: SHIPPED | OUTPATIENT
Start: 2025-05-09

## 2025-05-09 NOTE — PROGRESS NOTES
Subjective:       Patient ID: Patsy Perez is a 79 y.o. female.    Chief Complaint: Follow-up (6 months )    Follow-up        Problem List[1]    Past Medical History:   Diagnosis Date    CKD (chronic kidney disease)     DM II (diabetes mellitus, type II), controlled     Fatty liver     Gallstones     found on US    HLD (hyperlipidemia)     Hypertension     Hypothyroidism     s/p thyroidectomy       Past Surgical History:   Procedure Laterality Date    APPENDECTOMY      1985    CATARACT EXTRACTION      CHOLECYSTECTOMY  9/21/22    HYSTERECTOMY      2009 - Dr. Thorne [facility]    LAPAROSCOPIC CHOLECYSTECTOMY N/A 09/21/2022    Procedure: CHOLECYSTECTOMY, LAPAROSCOPIC;  Surgeon: Roslyn Mckeon DO;  Location: [facility] OR;  Service: General;  Laterality: N/A;    OOPHORECTOMY      THYROIDECTOMY      1985       Family History   Problem Relation Name Age of Onset    Cancer Mother Binta Fischer     Diabetes Mother Binta Fischer     Breast cancer Mother Binta Fischer         late 70s    Osteoarthritis Mother Binta Fischer         double knee replacement    Alzheimer's disease Mother Binta Fischer     Heart disease Father Spike Fischer     Alzheimer's disease Maternal Aunt x2     Alzheimer's disease Maternal Uncle      Alzheimer's disease Maternal Grandmother         Tobacco Use History[2]    Wt Readings from Last 5 Encounters:   05/09/25 77.2 kg (170 lb 3.1 oz)   02/05/25 77.2 kg (170 lb 3.1 oz)   11/07/24 78 kg (171 lb 15.3 oz)   11/07/24 78 kg (171 lb 15.3 oz)   08/16/24 74 kg (163 lb 2.3 oz)     Doing well.  Staying active.  Regular exercise.  Tolerating med.  Monitoring at home pressure and glucose with no active concerns    Review of Systems  review of systems negative  Objective:      Vitals:    05/09/25 0753   BP: 132/72   Pulse: 80   Resp: 20   Temp: 98.2 °F (36.8 °C)       Physical Exam  Constitutional:       General: She is not in acute distress.     Appearance: Normal appearance.  She is well-developed.   Cardiovascular:      Rate and Rhythm: Normal rate and regular rhythm.   Pulmonary:      Effort: Pulmonary effort is normal. No respiratory distress.      Breath sounds: Normal breath sounds.   Musculoskeletal:         General: Normal range of motion.   Neurological:      Mental Status: She is alert. She is not disoriented.   Psychiatric:         Mood and Affect: Mood normal.         Speech: Speech normal.         Assessment:       1. Hypothyroidism, unspecified type    2. Controlled type 2 diabetes mellitus with complication, without long-term current use of insulin    3. Hyperlipidemia associated with type 2 diabetes mellitus    4. Stage 3a chronic kidney disease    5. Hyperlipidemia, unspecified hyperlipidemia type    6. Hypertension, unspecified type    7. Diarrhea, unspecified type        Plan:   Hypothyroidism, unspecified type    Controlled type 2 diabetes mellitus with complication, without long-term current use of insulin  -     Lipid Panel; Future; Expected date: 05/09/2025  -     Hemoglobin A1C; Future; Expected date: 05/09/2025  -     TSH; Future; Expected date: 05/09/2025  -     Comprehensive Metabolic Panel; Future; Expected date: 05/09/2025  -     CBC W/ AUTO DIFFERENTIAL; Future; Expected date: 05/09/2025  -     metFORMIN (GLUCOPHAGE) 500 MG tablet; Take 1 tablet (500 mg total) by mouth 2 (two) times daily with meals.  Dispense: 180 tablet; Refill: 1    Hyperlipidemia associated with type 2 diabetes mellitus    Stage 3a chronic kidney disease    Hyperlipidemia, unspecified hyperlipidemia type    Hypertension, unspecified type    Diarrhea, unspecified type    Other orders  -     amLODIPine (NORVASC) 10 MG tablet; Take 1 tablet (10 mg total) by mouth once daily.  Dispense: 90 tablet; Refill: 1  -     fluticasone propionate (FLONASE) 50 mcg/actuation nasal spray; 1 spray (50 mcg total) by Each Nostril route Daily. Shake Liquid and use  Dispense: 32 g; Refill: 11      Assessment &  Plan    PLAN SUMMARY:  Comprehensive labs ordered in 10 weeks (A1C, renal function, thyroid function, lipid profile)  Continue current medication regimen: atenolol, amlodipine, hydrochlorothiazide, lisinopril, levothyroxine, metformin, aspirin  Continue Flonase nasal spray with 11 refills for allergic rhinitis  Continue loperamide as needed for diarrhea ( post gallbladder removal induced)   Discontinued glimepiride 1 mg daily for 2-3 month trial period as uncertain if  needed and my worry about hypoglycemia  Advised to avoid ibuprofen and other NSAIDs given CKD III  Follow up in 6 months    TYPE 2 DIABETES MELLITUS WITH OTHER SPECIFIED COMPLICATION:  Patient's A1C has remained stable / well controlled  Given the tight and satisfactory blood sugar control at her age, discontinued glimepiride 1 mg daily for a 2-3 month trial period to reduce hypoglycemia risk in this older patient.  Continued metformin 500 mg twice daily as primary diabetes medication.  Lipid profile is good on current pravastatin 80 mg daily.  Ordered comprehensive labs in 10 weeks, including A1C, renal function, thyroid function, and lipid profile.  Will reassess need for glimepiride based on upcoming lab results.  If need be and renal function allows can consider increase metformin rather than sulfonylurea.    STAGE 3A CHRONIC KIDNEY DISEASE:  Renal function shows stable impairment (CKD3A), sufficient to continue metformin 500 mg twice daily with monitoring.  Advised patient to avoid ibuprofen and other NSAIDs due to chronic kidney disease.  Comprehensive labs ordered in 10 weeks will also monitor renal function to ensure safe continued use of metformin.    H/O PVC  Patient was evaluated for premature ventricular contractions by cardiologist in February.  No recent episodes of irregular heartbeats reported.  Cardiovascular status remains stable with no current issues with PVCs.  Continue atenolol 50 mg daily.    ESSENTIAL  HYPERTENSION:  Hypertension is well controlled with current medication regimen.  Blood pressure remains stable.  Continue amlodipine 10 mg daily (provided refill), hydrochlorothiazide on Mondays, Wednesdays, and Fridays, and lisinopril and atenolol  Patient educated on potential side effect of angioedema with lisinopril use.    HYPOTHYROIDISM:  TSH levels are normal and thyroid function is stable.  Continue levothyroxine 75 mcg daily.  Thyroid function tests will be included in comprehensive labs ordered for 10 weeks from now.    ALLERGIC RHINITIS:  Continue Flonase nasal spray with 11 refills for management of allergic rhinitis.    POST-CHOLECYSTECTOMY COMPLICATIONS:  Patient experiences periodic diarrhea following cholecystectomy.  Continue loperamide as needed for symptom management.    LONG-TERM ASPIRIN USE:  Continue aspirin 81 mg daily.  No bleeding issues noted with current use.    FOLLOW-UP AND GENERAL MANAGEMENT:  Discussed balancing benefits and risks of medications, particularly in context of aging.  Follow up in 6 months.           This note was verbally dictated, please excuse any type errors.         [1]   Patient Active Problem List  Diagnosis    Hypothyroidism    Hypertension    HLD (hyperlipidemia)    Gallstones    Fatty liver    Type 2 diabetes mellitus with hyperglycemia, without long-term current use of insulin    CKD (chronic kidney disease)    Piriformis syndrome of left side    Weakness    Posture imbalance    H/O sciatica    Diarrhea    Allergic rhinitis    Hyperlipidemia associated with type 2 diabetes mellitus    Stage 3a chronic kidney disease   [2]   Social History  Tobacco Use   Smoking Status Former    Current packs/day: 0.75    Average packs/day: 0.8 packs/day for 25.0 years (18.8 ttl pk-yrs)    Types: Cigarettes   Smokeless Tobacco Never   Tobacco Comments    Quit over 30 yearsago

## 2025-05-13 ENCOUNTER — OFFICE VISIT (OUTPATIENT)
Dept: URGENT CARE | Facility: CLINIC | Age: 80
End: 2025-05-13
Payer: MEDICARE

## 2025-05-13 VITALS
BODY MASS INDEX: 28.36 KG/M2 | DIASTOLIC BLOOD PRESSURE: 76 MMHG | RESPIRATION RATE: 18 BRPM | TEMPERATURE: 98 F | OXYGEN SATURATION: 97 % | SYSTOLIC BLOOD PRESSURE: 149 MMHG | HEART RATE: 70 BPM | WEIGHT: 170.19 LBS | HEIGHT: 65 IN

## 2025-05-13 DIAGNOSIS — J06.9 VIRAL URI: Primary | ICD-10-CM

## 2025-05-13 DIAGNOSIS — J34.89 SINUS PRESSURE: ICD-10-CM

## 2025-05-13 LAB
CTP QC/QA: YES
CTP QC/QA: YES
POC MOLECULAR INFLUENZA A AGN: NEGATIVE
POC MOLECULAR INFLUENZA B AGN: NEGATIVE
SARS-COV+SARS-COV-2 AG RESP QL IA.RAPID: NEGATIVE

## 2025-05-13 PROCEDURE — 87502 INFLUENZA DNA AMP PROBE: CPT | Mod: QW,S$GLB,, | Performed by: NURSE PRACTITIONER

## 2025-05-13 PROCEDURE — 99214 OFFICE O/P EST MOD 30 MIN: CPT | Mod: S$GLB,,, | Performed by: NURSE PRACTITIONER

## 2025-05-13 PROCEDURE — 87811 SARS-COV-2 COVID19 W/OPTIC: CPT | Mod: QW,S$GLB,, | Performed by: NURSE PRACTITIONER

## 2025-05-13 RX ORDER — MOMETASONE FUROATE MONOHYDRATE 50 UG/1
2 SPRAY, METERED NASAL DAILY
Qty: 17 G | Refills: 0 | Status: SHIPPED | OUTPATIENT
Start: 2025-05-13

## 2025-05-13 RX ORDER — FEXOFENADINE HCL 180 MG/1
180 TABLET ORAL DAILY
Qty: 30 TABLET | Refills: 0 | Status: SHIPPED | OUTPATIENT
Start: 2025-05-13

## 2025-05-13 NOTE — PROGRESS NOTES
"Subjective:      Patient ID: Patsy Perez is a 79 y.o. female.    Vitals:  height is 5' 5" (1.651 m) and weight is 77.2 kg (170 lb 3.1 oz). Her oral temperature is 98.1 °F (36.7 °C). Her blood pressure is 149/76 (abnormal) and her pulse is 70. Her respiration is 18 and oxygen saturation is 97%.     Chief Complaint: Sinus Problem    79 yr old female presents to the Urgent Care with complaint of sinus pressure, headaches, nasal congestion and runny nose x 3 days. Symptoms began after attending a football game in the rain. Patient denies any CP, SOB, abdominal pain or fever.     Sinus Problem  This is a new problem. The current episode started in the past 7 days. The problem has been gradually improving since onset. There has been no fever. Associated symptoms include congestion, coughing, headaches and sinus pressure. Pertinent negatives include no chills, diaphoresis, ear pain, hoarse voice, neck pain, shortness of breath, sneezing, sore throat or swollen glands. Past treatments include oral decongestants. The treatment provided mild relief.       Constitution: Negative for chills, sweating, fatigue and fever.   HENT:  Positive for congestion and sinus pressure. Negative for ear pain and sore throat.    Neck: Negative for neck pain.   Cardiovascular:  Negative for chest pain and sob on exertion.   Respiratory:  Positive for cough. Negative for sputum production and shortness of breath.    Allergic/Immunologic: Negative for sneezing.   Neurological:  Positive for headaches.      Objective:     Physical Exam   Constitutional: She is oriented to person, place, and time. She appears well-developed. She is cooperative.  Non-toxic appearance. She does not appear ill. No distress.   HENT:   Head: Normocephalic and atraumatic.   Ears:   Right Ear: Hearing, tympanic membrane, external ear and ear canal normal.   Left Ear: Hearing, tympanic membrane, external ear and ear canal normal.   Nose: No mucosal edema, rhinorrhea or " nasal deformity. No epistaxis. Right sinus exhibits maxillary sinus tenderness. Right sinus exhibits no frontal sinus tenderness. Left sinus exhibits maxillary sinus tenderness. Left sinus exhibits no frontal sinus tenderness.   Mouth/Throat: Uvula is midline, oropharynx is clear and moist and mucous membranes are normal. No trismus in the jaw. Normal dentition. No uvula swelling. No oropharyngeal exudate, posterior oropharyngeal edema, posterior oropharyngeal erythema, tonsillar abscesses or cobblestoning. Tonsils are 2+ on the right. Tonsils are 2+ on the left. No tonsillar exudate.   Eyes: Conjunctivae, EOM and lids are normal. Pupils are equal, round, and reactive to light. No scleral icterus.   Neck: Trachea normal and phonation normal. Neck supple. No edema present. No erythema present. No neck rigidity present.   Cardiovascular: Normal rate, regular rhythm and normal heart sounds.   Pulmonary/Chest: Effort normal and breath sounds normal. No accessory muscle usage or stridor. No respiratory distress. She has no decreased breath sounds. She has no wheezes. She has no rhonchi. She has no rales.   Musculoskeletal: Normal range of motion.         General: No deformity or edema. Normal range of motion.   Neurological: She is alert and oriented to person, place, and time. She exhibits normal muscle tone. Coordination and gait normal.   Skin: Skin is warm, dry, intact, not diaphoretic and not pale. Capillary refill takes less than 2 seconds.   Psychiatric: Her speech is normal and behavior is normal. Judgment and thought content normal.   Nursing note and vitals reviewed.      Assessment:     1. Viral URI    2. Sinus pressure      Plan:     Presentation most consistent with Viral Upper Respiratory Infection. Discharged home with supportive care. I discussed the use of OTC medications for symptom control. I advised patient to maintain adequate hydration and advance diet as tolerated to maintain adequate nutrition. No  respiratory distress noted upon discharge. Work note issued at patient's request.    DDx: Allergic or seasonal rhinitis, Bacterial pharyngitis or tonsillitis, Acute bacterial rhinosinusitis, Influenza, Covid    I discussed with the patient the diagnosis, treatment plan, indications for the emergency department, and for expected follow-up. The patient verbalized an understanding. The patient is asked if there are any questions or concerns. We discuss the case, until all issues are addressed to the patient's satisfaction. Patient understands and is agreeable to the plan.     Viral URI  -     mometasone (NASONEX) 50 mcg/actuation nasal spray; 2 sprays by Nasal route once daily.  Dispense: 17 g; Refill: 0  -     fexofenadine (ALLEGRA) 180 MG tablet; Take 1 tablet (180 mg total) by mouth once daily.  Dispense: 30 tablet; Refill: 0    Sinus pressure  -     SARS Coronavirus 2 Antigen, POCT Manual Read  -     POCT Influenza A/B MOLECULAR

## 2025-05-13 NOTE — PATIENT INSTRUCTIONS
You have been diagnosed with a viral syndrome. Viral syndromes are self-limited and usually resolve within 10 days from onset of symptoms. Antibiotics are not effective against viral infections. It is important that you get lots of rest and drink plenty of fluids. Treatment is through symptomatic relief.    Below are suggestions for symptomatic relief:   -Tylenol every 4 hours OR ibuprofen every 6 hours as needed for pain/fever.   -Salt water gargles to soothe throat pain.   -Chloroseptic spray also helps to numb throat pain.   -Nasal saline spray reduces inflammation and dryness.   -Warm face compresses to help with facial sinus pain/pressure.   -Vicks vapor rub at night.   -Flonase OTC or Nasacort OTC for nasal congestion.   -Simple foods like chicken noodle soup.   -Delsym helps with coughing at night   -Zyrtec/Claritin during the day & Benadryl at night may help with allergies.     If you DO NOT have Hypertension or any history of palpitations, it is ok to take over the counter Sudafed or Mucinex D or Allegra-D or Claritin-D or Zyrtec-D.  If you do take one of the above, it is ok to combine that with plain over the counter Mucinex or Allegra or Claritin or Zyrtec. If, for example, you are taking Zyrtec -D, you can combine that with Mucinex, but not Mucinex-D.  If you are taking Mucinex-D, you can combine that with plain Allegra or Claritin or Zyrtec.   If you DO have Hypertension or palpitations, it is safe to take Coricidin HBP for relief of sinus symptoms.    Please return to clinic if symptoms have not subsided 10-14 days from onset, as your viral infection may have developed into a bacterial infection. It is at that time antibiotics would be indicated.     If you were prescribed a narcotic or controlled medication, do not drive or operate heavy equipment or machinery while taking these medications.  You must understand that you've received an Urgent Care treatment only and that you may be released before all  your medical problems are known or treated. You, the patient, will arrange for follow up care as instructed.  Follow up with your PCP or specialty clinic as directed within 2-5 days if not improved or as needed.  You can call [phone] to schedule an appointment with the appropriate provider.  If your condition worsens we recommend that you receive another evaluation at the emergency room immediately or contact your primary medical clinics after hours call service to discuss your concerns.  Please return here or go to the Emergency Department for any concerns or worsening of condition.

## 2025-06-02 LAB
ABSOLUTE EOSINOPHIL (OHS): 0.16 K/UL
ABSOLUTE MONOCYTE (OHS): 0.72 K/UL (ref 0.3–1)
ABSOLUTE NEUTROPHIL COUNT (OHS): 5.87 K/UL (ref 1.8–7.7)
ALBUMIN SERPL BCP-MCNC: 3.9 G/DL (ref 3.5–5.2)
ALP SERPL-CCNC: 63 UNIT/L (ref 40–150)
ALT SERPL W/O P-5'-P-CCNC: 22 UNIT/L (ref 10–44)
ANION GAP (OHS): 13 MMOL/L (ref 8–16)
AST SERPL-CCNC: 18 UNIT/L (ref 11–45)
BASOPHILS # BLD AUTO: 0.06 K/UL
BASOPHILS NFR BLD AUTO: 0.6 %
BILIRUB SERPL-MCNC: 0.3 MG/DL (ref 0.1–1)
BILIRUB UR QL STRIP.AUTO: NEGATIVE
BUN SERPL-MCNC: 19 MG/DL (ref 8–23)
CALCIUM SERPL-MCNC: 9.1 MG/DL (ref 8.7–10.5)
CHLORIDE SERPL-SCNC: 101 MMOL/L (ref 95–110)
CLARITY UR: CLEAR
CO2 SERPL-SCNC: 19 MMOL/L (ref 23–29)
COLOR UR AUTO: COLORLESS
CREAT SERPL-MCNC: 1 MG/DL (ref 0.5–1.4)
ERYTHROCYTE [DISTWIDTH] IN BLOOD BY AUTOMATED COUNT: 13.4 % (ref 11.5–14.5)
GFR SERPLBLD CREATININE-BSD FMLA CKD-EPI: 58 ML/MIN/1.73/M2
GLUCOSE SERPL-MCNC: 166 MG/DL (ref 70–110)
GLUCOSE UR QL STRIP: NEGATIVE
HCT VFR BLD AUTO: 40.5 % (ref 37–48.5)
HGB BLD-MCNC: 13.4 GM/DL (ref 12–16)
HGB UR QL STRIP: NEGATIVE
HOLD SPECIMEN: NORMAL
IMM GRANULOCYTES # BLD AUTO: 0.03 K/UL (ref 0–0.04)
IMM GRANULOCYTES NFR BLD AUTO: 0.3 % (ref 0–0.5)
INR PPP: 0.9 (ref 0.8–1.2)
KETONES UR QL STRIP: NEGATIVE
LEUKOCYTE ESTERASE UR QL STRIP: NEGATIVE
LYMPHOCYTES # BLD AUTO: 3.53 K/UL (ref 1–4.8)
MCH RBC QN AUTO: 29.3 PG (ref 27–31)
MCHC RBC AUTO-ENTMCNC: 33.1 G/DL (ref 32–36)
MCV RBC AUTO: 89 FL (ref 82–98)
NITRITE UR QL STRIP: NEGATIVE
NUCLEATED RBC (/100WBC) (OHS): 0 /100 WBC
PH UR STRIP: 7 [PH]
PLATELET # BLD AUTO: 335 K/UL (ref 150–450)
PMV BLD AUTO: 10.8 FL (ref 9.2–12.9)
POTASSIUM SERPL-SCNC: 3.9 MMOL/L (ref 3.5–5.1)
PROT SERPL-MCNC: 8.2 GM/DL (ref 6–8.4)
PROT UR QL STRIP: NEGATIVE
PROTHROMBIN TIME: 10.1 SECONDS (ref 9–12.5)
RBC # BLD AUTO: 4.57 M/UL (ref 4–5.4)
RELATIVE EOSINOPHIL (OHS): 1.5 %
RELATIVE LYMPHOCYTE (OHS): 34 % (ref 18–48)
RELATIVE MONOCYTE (OHS): 6.9 % (ref 4–15)
RELATIVE NEUTROPHIL (OHS): 56.7 % (ref 38–73)
SODIUM SERPL-SCNC: 133 MMOL/L (ref 136–145)
SP GR UR STRIP: 1.01
UROBILINOGEN UR STRIP-ACNC: NEGATIVE EU/DL
WBC # BLD AUTO: 10.37 K/UL (ref 3.9–12.7)

## 2025-06-02 PROCEDURE — 86803 HEPATITIS C AB TEST: CPT | Mod: HCNC | Performed by: EMERGENCY MEDICINE

## 2025-06-02 PROCEDURE — 99283 EMERGENCY DEPT VISIT LOW MDM: CPT | Mod: HCNC

## 2025-06-02 PROCEDURE — 80053 COMPREHEN METABOLIC PANEL: CPT | Mod: HCNC | Performed by: NURSE PRACTITIONER

## 2025-06-02 PROCEDURE — 85610 PROTHROMBIN TIME: CPT | Mod: HCNC | Performed by: NURSE PRACTITIONER

## 2025-06-02 PROCEDURE — 87389 HIV-1 AG W/HIV-1&-2 AB AG IA: CPT | Mod: HCNC | Performed by: EMERGENCY MEDICINE

## 2025-06-02 PROCEDURE — 81003 URINALYSIS AUTO W/O SCOPE: CPT | Mod: HCNC | Performed by: NURSE PRACTITIONER

## 2025-06-02 PROCEDURE — 85025 COMPLETE CBC W/AUTO DIFF WBC: CPT | Mod: HCNC | Performed by: NURSE PRACTITIONER

## 2025-06-03 ENCOUNTER — HOSPITAL ENCOUNTER (EMERGENCY)
Facility: HOSPITAL | Age: 80
Discharge: HOME | End: 2025-06-03
Attending: EMERGENCY MEDICINE
Payer: MEDICARE

## 2025-06-03 VITALS
BODY MASS INDEX: 28.6 KG/M2 | HEART RATE: 72 BPM | OXYGEN SATURATION: 99 % | RESPIRATION RATE: 20 BRPM | SYSTOLIC BLOOD PRESSURE: 182 MMHG | WEIGHT: 171.63 LBS | TEMPERATURE: 98 F | HEIGHT: 65 IN | DIASTOLIC BLOOD PRESSURE: 75 MMHG

## 2025-06-03 DIAGNOSIS — R31.9 HEMATURIA, UNSPECIFIED TYPE: Primary | ICD-10-CM

## 2025-06-03 LAB
HCV AB SERPL QL IA: NEGATIVE
HIV 1+2 AB+HIV1 P24 AG SERPL QL IA: NEGATIVE
HOLD SPECIMEN: NORMAL

## 2025-06-03 RX ORDER — FLUCONAZOLE 200 MG/1
200 TABLET ORAL DAILY
Qty: 3 TABLET | Refills: 0 | Status: SHIPPED | OUTPATIENT
Start: 2025-06-03 | End: 2025-06-06

## 2025-06-03 NOTE — DISCHARGE INSTRUCTIONS
Take medication as prescribed.  Follow up with Urology and Gynecology for further evaluation and management.  Referrals were sent for you today.  Call to make appointment.  Take your morning medications when you get home.  Return to ER for new or worsening symptoms.

## 2025-06-03 NOTE — ED PROVIDER NOTES
Encounter Date: 6/2/2025       History     Chief Complaint   Patient presents with    Hematuria     Pt reports noticing blood after she urinated at home pta. Pt has had a past hysterectomy and denies any pain.       79-year-old female who presents to ER complaining of blood on tissue after urination.  Patient is unsure if blood was coming from urethra or vagina.  Reports complete hysterectomy.  Denies dysuria, pelvic pain, vomiting, or fever.  Denies blood thinners.  Patient reports she does have some vaginal irritation.        Review of patient's allergies indicates:  No Known Allergies  Past Medical History:   Diagnosis Date    CKD (chronic kidney disease)     DM II (diabetes mellitus, type II), controlled     Fatty liver     Gallstones     found on US    HLD (hyperlipidemia)     Hypertension     Hypothyroidism     s/p thyroidectomy     Past Surgical History:   Procedure Laterality Date    APPENDECTOMY      1985    CATARACT EXTRACTION      CHOLECYSTECTOMY  9/21/22    HYSTERECTOMY      2009 - Dr. Thorne [facility]    LAPAROSCOPIC CHOLECYSTECTOMY N/A 09/21/2022    Procedure: CHOLECYSTECTOMY, LAPAROSCOPIC;  Surgeon: Roslyn Mckeon DO;  Location: [facility];  Service: General;  Laterality: N/A;    OOPHORECTOMY      THYROIDECTOMY      1985     Family History   Problem Relation Name Age of Onset    Cancer Mother Binta Fischer     Diabetes Mother Binta Fischer     Breast cancer Mother Binta Fischer         late 70s    Osteoarthritis Mother Binta Fischer         double knee replacement    Alzheimer's disease Mother Binta Fischer     Heart disease Father Spike Fischer     Alzheimer's disease Maternal Aunt x2     Alzheimer's disease Maternal Uncle      Alzheimer's disease Maternal Grandmother       Social History[1]  Review of Systems   Constitutional:  Negative for fever.   HENT:  Negative for sore throat.    Respiratory:  Negative for shortness of breath.    Cardiovascular:  Negative for  chest pain.   Gastrointestinal:  Negative for abdominal pain, nausea and vomiting.   Genitourinary:  Positive for hematuria and vaginal pain. Negative for decreased urine volume, difficulty urinating, dysuria, flank pain, genital sores, pelvic pain, urgency, vaginal bleeding and vaginal discharge.   Musculoskeletal:  Negative for back pain.   Skin:  Negative for rash.   Neurological:  Negative for weakness.   Hematological:  Does not bruise/bleed easily.       Physical Exam     Initial Vitals [06/02/25 1822]   BP Pulse Resp Temp SpO2   (!) 187/84 89 18 97.9 °F (36.6 °C) 98 %      MAP       --         Physical Exam    Nursing note and vitals reviewed.  Constitutional: She appears well-developed and well-nourished.   HENT:   Head: Normocephalic and atraumatic.   Eyes: Conjunctivae and EOM are normal. Pupils are equal, round, and reactive to light.   Neck: Neck supple.   Normal range of motion.  Cardiovascular:  Normal heart sounds.           Pulmonary/Chest: Breath sounds normal. No respiratory distress.   Abdominal: She exhibits no distension. There is no abdominal tenderness.   Musculoskeletal:         General: Normal range of motion.      Cervical back: Normal range of motion and neck supple.     Neurological: She is alert and oriented to person, place, and time. She has normal strength. No cranial nerve deficit or sensory deficit. GCS score is 15. GCS eye subscore is 4. GCS verbal subscore is 5. GCS motor subscore is 6.   Skin: Skin is warm and dry. Capillary refill takes less than 2 seconds. No rash noted.   Psychiatric: She has a normal mood and affect.         ED Course   Procedures  Labs Reviewed   COMPREHENSIVE METABOLIC PANEL - Abnormal       Result Value    Sodium 133 (*)     Potassium 3.9      Chloride 101      CO2 19 (*)     Glucose 166 (*)     BUN 19      Creatinine 1.0      Calcium 9.1      Protein Total 8.2      Albumin 3.9      Bilirubin Total 0.3      ALP 63      AST 18      ALT 22      Anion Gap 13       eGFR 58 (*)    URINALYSIS, REFLEX TO URINE CULTURE - Abnormal    Color, UA Colorless (*)     Appearance, UA Clear      pH, UA 7.0      Spec Grav UA 1.010      Protein, UA Negative      Glucose, UA Negative      Ketones, UA Negative      Bilirubin, UA Negative      Blood, UA Negative      Nitrites, UA Negative      Urobilinogen, UA Negative      Leukocyte Esterase, UA Negative     HEPATITIS C ANTIBODY - Normal    Hep C Ab Interp Negative     HIV 1 / 2 ANTIBODY - Normal    HIV 1/2 Ag/Ab Negative     CBC WITH DIFFERENTIAL - Normal    WBC 10.37      RBC 4.57      HGB 13.4      HCT 40.5      MCV 89      MCH 29.3      MCHC 33.1      RDW 13.4      Platelet Count 335      MPV 10.8      Nucleated RBC 0      Neut % 56.7      Lymph % 34.0      Mono % 6.9      Eos % 1.5      Basophil % 0.6      Imm Grans % 0.3      Neut # 5.87      Lymph # 3.53      Mono # 0.72      Eos # 0.16      Baso # 0.06      Imm Grans # 0.03     PROTIME-INR - Normal    PT 10.1      INR 0.9     HEP C VIRUS HOLD SPECIMEN    Extra Tube Hold for add-ons.     CBC W/ AUTO DIFFERENTIAL    Narrative:     The following orders were created for panel order CBC auto differential.  Procedure                               Abnormality         Status                     ---------                               -----------         ------                     CBC with Differential[[id number]]       Normal              Final result                 Please view results for these tests on the individual orders.   GREY TOP URINE HOLD    Extra Tube Hold for add-ons.            Imaging Results    None          Medications - No data to display  Medical Decision Making  Risk  Prescription drug management.                           2:15 AM  Patient presents to ER for evaluation of blood on tissue after urinating.  Patient comfortable.  Afebrile.  Nontoxic appearing.  Vital signs stable.  No leukocytosis noted in labs.  Hemoglobin and hematocrit are stable.  Kidney function is  stable.  UA is negative for blood or infection.  All results discussed with patient.  Advised to follow up with both Urology and Gynecology for further evaluation and management.  I will prescribe Diflucan for vaginal irritation.  Patient verbalized understanding and is in agreement with this plan.  Stable for discharge.  Differential diagnosis include UTI, HANNAH, nephrolithiasis, pyelonephritis, bladder cancer, candidiasis, vaginal trauma.           Clinical Impression:  Final diagnoses:  [R31.9] Hematuria, unspecified type (Primary)          ED Disposition Condition    Discharge Stable          ED Prescriptions       Medication Sig Dispense Start Date End Date Auth. Provider    fluconazole (DIFLUCAN) 200 MG Tab Take 1 tablet (200 mg total) by mouth once daily. for 3 days 3 tablet 6/3/2025 6/6/2025 Mariama Marcelino NP          Follow-up Information       Follow up With Specialties Details Why Contact Info    [facility] Urology Call   [id number] [facility] [id number] [phone]                   [1]   Social History  Tobacco Use    Smoking status: Former     Current packs/day: 0.75     Average packs/day: 0.8 packs/day for 25.0 years (18.8 ttl pk-yrs)     Types: Cigarettes    Smokeless tobacco: Never    Tobacco comments:     Quit over 30 yearsago   Substance Use Topics    Alcohol use: Not Currently     Comment: occ    Drug use: Never        Mariama Marcelino NP  06/04/25 0358

## 2025-06-03 NOTE — FIRST PROVIDER EVALUATION
"Medical screening examination initiated.  I have conducted a focused provider triage encounter, findings are as follows:    Brief history of present illness:  79-year-old female who presents to ER complaining of blood on tissue after urination.  Patient is unsure if blood was coming from urethra or vagina.  Reports complete hysterectomy.  Denies dysuria, pelvic pain, vomiting, or fever.  Denies blood thinners.    Vitals:    06/02/25 1822   BP: (!) 187/84   BP Location: Right arm   Pulse: 89   Resp: 18   Temp: 97.9 °F (36.6 °C)   TempSrc: Oral   SpO2: 98%   Weight: 77.8 kg (171 lb 9.6 oz)   Height: 5' 5" (1.651 m)       Pertinent physical exam:  Hypertensive.  No acute distress.    Brief workup plan:  Labs, further evaluation    Preliminary workup initiated; this workup will be continued and followed by the physician or advanced practice provider that is assigned to the patient when roomed.  "

## 2025-06-04 ENCOUNTER — OFFICE VISIT (OUTPATIENT)
Dept: OBSTETRICS AND GYNECOLOGY | Facility: CLINIC | Age: 80
End: 2025-06-04
Payer: MEDICARE

## 2025-06-04 VITALS
DIASTOLIC BLOOD PRESSURE: 76 MMHG | BODY MASS INDEX: 28.25 KG/M2 | HEIGHT: 65 IN | SYSTOLIC BLOOD PRESSURE: 164 MMHG | WEIGHT: 169.56 LBS

## 2025-06-04 DIAGNOSIS — N81.12 CYSTOCELE, LATERAL: ICD-10-CM

## 2025-06-04 DIAGNOSIS — N95.2 VAGINAL ATROPHY: Primary | ICD-10-CM

## 2025-06-04 DIAGNOSIS — R31.9 HEMATURIA, UNSPECIFIED TYPE: ICD-10-CM

## 2025-06-04 PROCEDURE — 99999 PR PBB SHADOW E&M-EST. PATIENT-LVL IV: CPT | Mod: PBBFAC,HCNC,, | Performed by: NURSE PRACTITIONER

## 2025-06-04 PROCEDURE — 1160F RVW MEDS BY RX/DR IN RCRD: CPT | Mod: CPTII,HCNC,S$GLB, | Performed by: NURSE PRACTITIONER

## 2025-06-04 PROCEDURE — 3078F DIAST BP <80 MM HG: CPT | Mod: CPTII,HCNC,S$GLB, | Performed by: NURSE PRACTITIONER

## 2025-06-04 PROCEDURE — 3288F FALL RISK ASSESSMENT DOCD: CPT | Mod: CPTII,HCNC,S$GLB, | Performed by: NURSE PRACTITIONER

## 2025-06-04 PROCEDURE — 1126F AMNT PAIN NOTED NONE PRSNT: CPT | Mod: CPTII,HCNC,S$GLB, | Performed by: NURSE PRACTITIONER

## 2025-06-04 PROCEDURE — 3077F SYST BP >= 140 MM HG: CPT | Mod: CPTII,HCNC,S$GLB, | Performed by: NURSE PRACTITIONER

## 2025-06-04 PROCEDURE — 1101F PT FALLS ASSESS-DOCD LE1/YR: CPT | Mod: CPTII,HCNC,S$GLB, | Performed by: NURSE PRACTITIONER

## 2025-06-04 PROCEDURE — 1159F MED LIST DOCD IN RCRD: CPT | Mod: CPTII,HCNC,S$GLB, | Performed by: NURSE PRACTITIONER

## 2025-06-04 PROCEDURE — 99213 OFFICE O/P EST LOW 20 MIN: CPT | Mod: HCNC,S$GLB,, | Performed by: NURSE PRACTITIONER

## 2025-06-04 RX ORDER — ESTRADIOL 0.1 MG/G
1 CREAM VAGINAL
Qty: 42.5 G | Refills: 1 | Status: SHIPPED | OUTPATIENT
Start: 2025-06-05 | End: 2026-06-05

## 2025-06-10 ENCOUNTER — OFFICE VISIT (OUTPATIENT)
Dept: UROLOGY | Facility: CLINIC | Age: 80
End: 2025-06-10
Payer: MEDICARE

## 2025-06-10 VITALS
HEART RATE: 16 BPM | DIASTOLIC BLOOD PRESSURE: 80 MMHG | BODY MASS INDEX: 28.21 KG/M2 | SYSTOLIC BLOOD PRESSURE: 158 MMHG | WEIGHT: 169.56 LBS

## 2025-06-10 DIAGNOSIS — R31.9 HEMATURIA, UNSPECIFIED TYPE: ICD-10-CM

## 2025-06-10 PROCEDURE — 1160F RVW MEDS BY RX/DR IN RCRD: CPT | Mod: CPTII,HCNC,S$GLB, | Performed by: UROLOGY

## 2025-06-10 PROCEDURE — 99999 PR PBB SHADOW E&M-EST. PATIENT-LVL IV: CPT | Mod: PBBFAC,HCNC,, | Performed by: UROLOGY

## 2025-06-10 PROCEDURE — 1159F MED LIST DOCD IN RCRD: CPT | Mod: CPTII,HCNC,S$GLB, | Performed by: UROLOGY

## 2025-06-10 PROCEDURE — 3077F SYST BP >= 140 MM HG: CPT | Mod: CPTII,HCNC,S$GLB, | Performed by: UROLOGY

## 2025-06-10 PROCEDURE — 1101F PT FALLS ASSESS-DOCD LE1/YR: CPT | Mod: CPTII,HCNC,S$GLB, | Performed by: UROLOGY

## 2025-06-10 PROCEDURE — 1126F AMNT PAIN NOTED NONE PRSNT: CPT | Mod: CPTII,HCNC,S$GLB, | Performed by: UROLOGY

## 2025-06-10 PROCEDURE — 3288F FALL RISK ASSESSMENT DOCD: CPT | Mod: CPTII,HCNC,S$GLB, | Performed by: UROLOGY

## 2025-06-10 PROCEDURE — 99203 OFFICE O/P NEW LOW 30 MIN: CPT | Mod: HCNC,S$GLB,, | Performed by: UROLOGY

## 2025-06-10 PROCEDURE — 3079F DIAST BP 80-89 MM HG: CPT | Mod: CPTII,HCNC,S$GLB, | Performed by: UROLOGY

## 2025-06-10 NOTE — PROGRESS NOTES
Chief Complaint:   Encounter Diagnosis   Name Primary?    Hematuria, unspecified type        HPI:  HPI Patsy Perez andrew 79 y.o. female who presents with follow up from an ER visit where she went to the ER due to finding some blood when she wiped.  She has a history of a hysterectomy in the past.  She was referred to Gynecology and Urology.  Urinalysis in the ER was clear.  Gynecology thought that this may be due to vaginal atrophy.  She has not had any further trouble and was started on estrogen cream.  She denies any history of kidney stones.  She has a remote history of smoking in her teens and 20s.  She has never had kidney stones.  She denies any frequency urgency or UTIs.    History:  Social History[1]  Past Medical History:   Diagnosis Date    CKD (chronic kidney disease)     DM II (diabetes mellitus, type II), controlled     Fatty liver     Gallstones     found on US    HLD (hyperlipidemia)     Hypertension     Hypothyroidism     s/p thyroidectomy     Past Surgical History:   Procedure Laterality Date    APPENDECTOMY      1985    CATARACT EXTRACTION      CHOLECYSTECTOMY  9/21/22    HYSTERECTOMY      2009 - Dr. Thorne [facility]    LAPAROSCOPIC CHOLECYSTECTOMY N/A 09/21/2022    Procedure: CHOLECYSTECTOMY, LAPAROSCOPIC;  Surgeon: Roslyn Mckeon DO;  Location: [facility] OR;  Service: General;  Laterality: N/A;    OOPHORECTOMY      THYROIDECTOMY      1985     Family History   Problem Relation Name Age of Onset    Cancer Mother Binta Fischer     Diabetes Mother Binta Fischer     Breast cancer Mother Binta Fischer         late 70s    Osteoarthritis Mother Binta Fischer         double knee replacement    Alzheimer's disease Mother Binta Fischer     Heart disease Father Spike Fischer     Alzheimer's disease Maternal Aunt x2     Alzheimer's disease Maternal Uncle      Alzheimer's disease Maternal Grandmother         Medications Ordered Prior to Encounter[2]     Objective:      Vitals:    06/10/25 1535   BP: (!) 158/80   BP Location: Left arm   Patient Position: Sitting   Pulse: (!) 16   Weight: 76.9 kg (169 lb 8.5 oz)      BMI Readings from Last 1 Encounters:   06/10/25 28.21 kg/m²          Physical Exam  No acute distress alert and oriented   Respirations even unlabored   Abdomen is soft nontender     Urinalysis is clear    Lab Results   Component Value Date    CREATININE 1.0 06/02/2025      Assessment:       1. Hematuria, unspecified type        Plan:     1. Hematuria, unspecified type        Reassured no evidence of hematuria.  Suspect atrophic vaginitis.  Continue estrogen cream as needed.  Patient will let us know if she sees anything concerning for hematuria.         [1]   Social History  Tobacco Use    Smoking status: Former     Current packs/day: 0.75     Average packs/day: 0.8 packs/day for 25.0 years (18.8 ttl pk-yrs)     Types: Cigarettes    Smokeless tobacco: Never    Tobacco comments:     Quit over 30 yearsago   Substance Use Topics    Alcohol use: Not Currently     Comment: occ    Drug use: Never   [2]   Current Outpatient Medications on File Prior to Visit   Medication Sig Dispense Refill    amLODIPine (NORVASC) 10 MG tablet Take 1 tablet (10 mg total) by mouth once daily. 90 tablet 1    aspirin (ECOTRIN) 81 MG EC tablet Aspir-81 81 mg tablet,delayed release  Take 1 tablet every day by oral route for 30 days. 90 tablet 1    atenoloL (TENORMIN) 50 MG tablet Take 1 tablet (50 mg total) by mouth once daily. 90 tablet 3    blood sugar diagnostic (ACCU-CHEK SMARTVIEW TEST STRIP) Strp Use to test blood glucose two (2) times daily as directed with insurance preferred meter and supplies 200 strip 3    estradioL (ESTRACE) 0.01 % (0.1 mg/gram) vaginal cream Place 1 g vaginally twice a week. 42.5 g 1    fexofenadine (ALLEGRA) 180 MG tablet Take 1 tablet (180 mg total) by mouth once daily. 30 tablet 0    fluticasone propionate (FLONASE) 50 mcg/actuation nasal spray 1 spray (50 mcg  total) by Each Nostril route Daily. Shake Liquid and use 32 g 11    hydroCHLOROthiazide 12.5 MG Tab Take 1 tablet (12.5 mg total) by mouth every Mon, Wed, Fri. 36 tablet 3    lancets (ACCU-CHEK FASTCLIX LANCET DRUM) Misc Use to test blood glucose two (2) times daily as directed with insurance preferred meter and supplies 200 each 3    levothyroxine (SYNTHROID) 75 MCG tablet TAKE 1 TABLET BY MOUTH EVERY DAY 90 tablet 2    lisinopriL (PRINIVIL,ZESTRIL) 40 MG tablet Take 1 tablet (40 mg total) by mouth once daily. 90 tablet 3    loperamide (IMODIUM A-D) 2 mg Tab Take 2 mg by mouth 4 (four) times daily as needed (ANTI DIARRHEAL).      metFORMIN (GLUCOPHAGE) 500 MG tablet Take 1 tablet (500 mg total) by mouth 2 (two) times daily with meals. 180 tablet 1    mometasone (NASONEX) 50 mcg/actuation nasal spray 2 sprays by Nasal route once daily. 17 g 0    multivitamin (ONE DAILY MULTIVITAMIN) per tablet Take 1 tablet by mouth once daily.      pravastatin (PRAVACHOL) 80 MG tablet TAKE 1 TABLET(80 MG) BY MOUTH EVERY DAY 90 tablet 3    glimepiride (AMARYL) 1 MG tablet Take 1 tablet (1 mg total) by mouth before breakfast. 90 tablet 3     No current facility-administered medications on file prior to visit.

## 2025-06-13 ENCOUNTER — PATIENT OUTREACH (OUTPATIENT)
Dept: ADMINISTRATIVE | Facility: HOSPITAL | Age: 80
End: 2025-06-13
Payer: MEDICARE

## 2025-06-13 VITALS — DIASTOLIC BLOOD PRESSURE: 69 MMHG | SYSTOLIC BLOOD PRESSURE: 131 MMHG

## 2025-06-13 DIAGNOSIS — E11.65 TYPE 2 DIABETES MELLITUS WITH HYPERGLYCEMIA, WITHOUT LONG-TERM CURRENT USE OF INSULIN: Primary | ICD-10-CM

## 2025-06-13 DIAGNOSIS — N18.31 STAGE 3A CHRONIC KIDNEY DISEASE: ICD-10-CM

## 2025-06-13 NOTE — PROGRESS NOTES
"  VB Score: 0     Uncontrolled BP  Patient is not due for any topics at this time.                 Pt seen for follow 5/09/25. Pt has lab scheduled, 7/23/25. Micro albumin urine ordered and linked to appt.  6 mon th follow up/annual scheduled, 11/12/25.  Requested an updated bp reading. She rechecked her bp 6/10/25. /69, remote entry made. She states she has "terrible" white coat syndrome and that she monitors her bp a lot and it remains good except when she goes to the dr and she had been upset that day.  "

## 2025-06-19 RX ORDER — PRAVASTATIN SODIUM 80 MG/1
80 TABLET ORAL NIGHTLY
Qty: 90 TABLET | Refills: 0 | Status: SHIPPED | OUTPATIENT
Start: 2025-06-19

## 2025-06-19 NOTE — TELEPHONE ENCOUNTER
Refill Routing Note   Medication(s) are not appropriate for processing by [facility] for the following reason(s):        Required labs outdated  No active prescription written by provider    [facility] action(s):  Defer        Medication Therapy Plan: FLOS (A1c)      Appointments  past 12m or future 3m with PCP    Date Provider   Last Visit   5/9/2025 Christ Yepez MD   Next Visit   11/12/2025 Christ Yepez MD   ED visits in past 90 days: 1        Note composed:2:39 PM 06/19/2025

## 2025-06-19 NOTE — TELEPHONE ENCOUNTER
Care Due:                  Date            Visit Type   Department     Provider  --------------------------------------------------------------------------------                                EP -                              PRIMARY      [facility] INTERNAL  Last Visit: 05-      CARE ([facility])   ANGELA Yepez                              EP -                              PRIMARY      [facility] INTERNAL  Next Visit: 11-      CARE ([facility])   ANGELA Yepez                                                            Last  Test          Frequency    Reason                     Performed    Due Date  --------------------------------------------------------------------------------    HBA1C.......  6 months...  metFORMIN................  11- 05-    Lipid Panel.  12 months..  pravastatin..............  05- 05-    Health [facility] Embedded Care Due Messages. Reference number: [id number].   6/19/2025 10:45:04 AM CDT

## 2025-06-22 DIAGNOSIS — I10 ESSENTIAL HYPERTENSION: ICD-10-CM

## 2025-06-23 RX ORDER — LISINOPRIL 40 MG/1
40 TABLET ORAL DAILY
Qty: 90 TABLET | Refills: 3 | Status: SHIPPED | OUTPATIENT
Start: 2025-06-23

## 2025-07-10 RX ORDER — PRAVASTATIN SODIUM 80 MG/1
80 TABLET ORAL NIGHTLY
Qty: 90 TABLET | Refills: 0 | Status: SHIPPED | OUTPATIENT
Start: 2025-07-10

## 2025-07-10 NOTE — TELEPHONE ENCOUNTER
No care due was identified.  Health [facility] Embedded Care Due Messages. Reference number: [id number].   7/10/2025 5:53:03 AM CDT

## 2025-07-10 NOTE — TELEPHONE ENCOUNTER
Refill Routing Note   Medication(s) are not appropriate for processing by [facility] for the following reason(s):        Required labs outdated    [facility] action(s):  Defer               Appointments  past 12m or future 3m with PCP    Date Provider   Last Visit   5/9/2025 Christ Yepez MD   Next Visit   11/12/2025 Christ Yepez MD   ED visits in past 90 days: 1        Note composed:9:32 AM 07/10/2025

## 2025-07-23 ENCOUNTER — LAB VISIT (OUTPATIENT)
Dept: LAB | Facility: HOSPITAL | Age: 80
End: 2025-07-23
Attending: FAMILY MEDICINE
Payer: MEDICARE

## 2025-07-23 DIAGNOSIS — E11.8 CONTROLLED TYPE 2 DIABETES MELLITUS WITH COMPLICATION, WITHOUT LONG-TERM CURRENT USE OF INSULIN: ICD-10-CM

## 2025-07-23 LAB
ABSOLUTE EOSINOPHIL (OHS): 0.22 K/UL
ABSOLUTE MONOCYTE (OHS): 0.79 K/UL (ref 0.3–1)
ABSOLUTE NEUTROPHIL COUNT (OHS): 5.13 K/UL (ref 1.8–7.7)
ALBUMIN SERPL BCP-MCNC: 3.9 G/DL (ref 3.5–5.2)
ALP SERPL-CCNC: 60 UNIT/L (ref 40–150)
ALT SERPL W/O P-5'-P-CCNC: 21 UNIT/L (ref 10–44)
ANION GAP (OHS): 11 MMOL/L (ref 8–16)
AST SERPL-CCNC: 21 UNIT/L (ref 11–45)
BASOPHILS # BLD AUTO: 0.12 K/UL
BASOPHILS NFR BLD AUTO: 1.3 %
BILIRUB SERPL-MCNC: 0.4 MG/DL (ref 0.1–1)
BUN SERPL-MCNC: 15 MG/DL (ref 8–23)
CALCIUM SERPL-MCNC: 9.2 MG/DL (ref 8.7–10.5)
CHLORIDE SERPL-SCNC: 104 MMOL/L (ref 95–110)
CHOLEST SERPL-MCNC: 128 MG/DL (ref 120–199)
CHOLEST/HDLC SERPL: 2.5 {RATIO} (ref 2–5)
CO2 SERPL-SCNC: 21 MMOL/L (ref 23–29)
CREAT SERPL-MCNC: 1 MG/DL (ref 0.5–1.4)
EAG (OHS): 146 MG/DL (ref 68–131)
ERYTHROCYTE [DISTWIDTH] IN BLOOD BY AUTOMATED COUNT: 13.9 % (ref 11.5–14.5)
GFR SERPLBLD CREATININE-BSD FMLA CKD-EPI: 58 ML/MIN/1.73/M2
GLUCOSE SERPL-MCNC: 172 MG/DL (ref 70–110)
HBA1C MFR BLD: 6.7 % (ref 4–5.6)
HCT VFR BLD AUTO: 40.8 % (ref 37–48.5)
HDLC SERPL-MCNC: 52 MG/DL (ref 40–75)
HDLC SERPL: 40.6 % (ref 20–50)
HGB BLD-MCNC: 13.1 GM/DL (ref 12–16)
IMM GRANULOCYTES # BLD AUTO: 0.03 K/UL (ref 0–0.04)
IMM GRANULOCYTES NFR BLD AUTO: 0.3 % (ref 0–0.5)
LDLC SERPL CALC-MCNC: 45.6 MG/DL (ref 63–159)
LYMPHOCYTES # BLD AUTO: 3.17 K/UL (ref 1–4.8)
MCH RBC QN AUTO: 29.7 PG (ref 27–31)
MCHC RBC AUTO-ENTMCNC: 32.1 G/DL (ref 32–36)
MCV RBC AUTO: 93 FL (ref 82–98)
NONHDLC SERPL-MCNC: 76 MG/DL
NUCLEATED RBC (/100WBC) (OHS): 0 /100 WBC
PLATELET # BLD AUTO: 312 K/UL (ref 150–450)
PMV BLD AUTO: 11.9 FL (ref 9.2–12.9)
POTASSIUM SERPL-SCNC: 4.9 MMOL/L (ref 3.5–5.1)
PROT SERPL-MCNC: 7.2 GM/DL (ref 6–8.4)
RBC # BLD AUTO: 4.41 M/UL (ref 4–5.4)
RELATIVE EOSINOPHIL (OHS): 2.3 %
RELATIVE LYMPHOCYTE (OHS): 33.5 % (ref 18–48)
RELATIVE MONOCYTE (OHS): 8.4 % (ref 4–15)
RELATIVE NEUTROPHIL (OHS): 54.2 % (ref 38–73)
SODIUM SERPL-SCNC: 136 MMOL/L (ref 136–145)
TRIGL SERPL-MCNC: 152 MG/DL (ref 30–150)
TSH SERPL-ACNC: 1.27 UIU/ML (ref 0.4–4)
WBC # BLD AUTO: 9.46 K/UL (ref 3.9–12.7)

## 2025-07-23 PROCEDURE — 85025 COMPLETE CBC W/AUTO DIFF WBC: CPT | Mod: HCNC

## 2025-07-23 PROCEDURE — 36415 COLL VENOUS BLD VENIPUNCTURE: CPT | Mod: HCNC

## 2025-07-23 PROCEDURE — 84443 ASSAY THYROID STIM HORMONE: CPT | Mod: HCNC

## 2025-07-23 PROCEDURE — 80053 COMPREHEN METABOLIC PANEL: CPT | Mod: HCNC

## 2025-07-23 PROCEDURE — 83036 HEMOGLOBIN GLYCOSYLATED A1C: CPT | Mod: HCNC

## 2025-07-23 PROCEDURE — 80061 LIPID PANEL: CPT | Mod: HCNC

## 2025-07-24 ENCOUNTER — TELEPHONE (OUTPATIENT)
Dept: ADMINISTRATIVE | Facility: HOSPITAL | Age: 80
End: 2025-07-24
Payer: MEDICARE

## 2025-07-24 ENCOUNTER — PATIENT OUTREACH (OUTPATIENT)
Dept: ADMINISTRATIVE | Facility: HOSPITAL | Age: 80
End: 2025-07-24
Payer: MEDICARE

## 2025-07-24 VITALS — DIASTOLIC BLOOD PRESSURE: 72 MMHG | SYSTOLIC BLOOD PRESSURE: 133 MMHG

## 2025-08-05 ENCOUNTER — OFFICE VISIT (OUTPATIENT)
Dept: CARDIOLOGY | Facility: CLINIC | Age: 80
End: 2025-08-05
Payer: MEDICARE

## 2025-08-05 VITALS
BODY MASS INDEX: 28.18 KG/M2 | WEIGHT: 169.31 LBS | DIASTOLIC BLOOD PRESSURE: 67 MMHG | OXYGEN SATURATION: 100 % | SYSTOLIC BLOOD PRESSURE: 137 MMHG | HEART RATE: 71 BPM

## 2025-08-05 DIAGNOSIS — E11.65 TYPE 2 DIABETES MELLITUS WITH HYPERGLYCEMIA, WITHOUT LONG-TERM CURRENT USE OF INSULIN: ICD-10-CM

## 2025-08-05 DIAGNOSIS — I49.3 PVC (PREMATURE VENTRICULAR CONTRACTION): ICD-10-CM

## 2025-08-05 DIAGNOSIS — I10 WHITE COAT SYNDROME WITH DIAGNOSIS OF HYPERTENSION: ICD-10-CM

## 2025-08-05 DIAGNOSIS — Z87.891 EX-SMOKER: ICD-10-CM

## 2025-08-05 DIAGNOSIS — I10 ESSENTIAL HYPERTENSION: Primary | ICD-10-CM

## 2025-08-05 DIAGNOSIS — E78.49 OTHER HYPERLIPIDEMIA: ICD-10-CM

## 2025-08-05 PROCEDURE — 3075F SYST BP GE 130 - 139MM HG: CPT | Mod: CPTII,HCNC,S$GLB, | Performed by: INTERNAL MEDICINE

## 2025-08-05 PROCEDURE — 1126F AMNT PAIN NOTED NONE PRSNT: CPT | Mod: CPTII,HCNC,S$GLB, | Performed by: INTERNAL MEDICINE

## 2025-08-05 PROCEDURE — 1101F PT FALLS ASSESS-DOCD LE1/YR: CPT | Mod: CPTII,HCNC,S$GLB, | Performed by: INTERNAL MEDICINE

## 2025-08-05 PROCEDURE — 99999 PR PBB SHADOW E&M-EST. PATIENT-LVL IV: CPT | Mod: PBBFAC,HCNC,, | Performed by: INTERNAL MEDICINE

## 2025-08-05 PROCEDURE — 3078F DIAST BP <80 MM HG: CPT | Mod: CPTII,HCNC,S$GLB, | Performed by: INTERNAL MEDICINE

## 2025-08-05 PROCEDURE — 3288F FALL RISK ASSESSMENT DOCD: CPT | Mod: CPTII,HCNC,S$GLB, | Performed by: INTERNAL MEDICINE

## 2025-08-05 PROCEDURE — 1159F MED LIST DOCD IN RCRD: CPT | Mod: CPTII,HCNC,S$GLB, | Performed by: INTERNAL MEDICINE

## 2025-08-05 PROCEDURE — 99214 OFFICE O/P EST MOD 30 MIN: CPT | Mod: HCNC,S$GLB,, | Performed by: INTERNAL MEDICINE

## 2025-08-05 NOTE — PROGRESS NOTES
"  Subjective:   Patient ID:  08/[id number]      Patsy Perez is a 79 y.o. female who presents for evaluation of Follow-up      History of Present Illness    CHIEF COMPLAINT:  Patient presents today for follow up.    HYPERTENSION:  She reports home BP consistently in the 120s and 130s, with yesterday's reading at 138/62. She acknowledges having white coat syndrome, noting BP tends to elevate upon arriving at medical facilities. She attributes recent BP fluctuations to COVID-19 and COVID vaccination, stating these events marked the onset of BP issues. She is currently taking amlodipine 10 mg, atenolol, HCTZ, and lisinopril. She denies significant BP-related symptoms.    CARDIOVASCULAR:  She reports previous episodes of chest pain and palpitations described as fluttering that have since resolved. She denies current chest pain, dyspnea, or swelling. A stress test performed last year was normal. 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 She has had no further cardiac problems and feels her condition is stable.    DIABETES:  She reports elevated morning fasting glucose since discontinuing glyburide. Fasting glucose has been running at 172 mg/dL in the mornings but typically returns to normal levels during the day. A1C has increased from 6.3 in November to 6.7 in current testing.    DIET AND EXERCISE:  She engages in regular physical activity, including going to the gym three days per week, riding bikes, and lifting weights. She denies any limitations or problems with exercise routine and notes that physical activity helps improve her condition, particularly in the morning. She reports frequent fast food consumption, including fried foods and chicken, and acknowledges consuming processed foods with inherent sodium content. She does not add salt at home and recognizes the need to improve her dietary habits related to " fast food intake.    CURRENT MEDICATIONS:  She is currently taking amlodipine 10 mg, atenolol, HCTZ, lisinopril, and pravastatin for cholesterol management.      ROS:  ROS findings as noted in HPI.         Follow-up        Past Medical History:   Diagnosis Date    CKD (chronic kidney disease)     DM II (diabetes mellitus, type II), controlled     Fatty liver     Gallstones     found on US    HLD (hyperlipidemia)     Hypertension     Hypothyroidism     s/p thyroidectomy       Past Surgical History:   Procedure Laterality Date    APPENDECTOMY      1985    CATARACT EXTRACTION      CHOLECYSTECTOMY  9/21/22    HYSTERECTOMY      2009 - Dr. Thorne [facility]    LAPAROSCOPIC CHOLECYSTECTOMY N/A 09/21/2022    Procedure: CHOLECYSTECTOMY, LAPAROSCOPIC;  Surgeon: Roslyn Mckeon DO;  Location: [facility] OR;  Service: General;  Laterality: N/A;    OOPHORECTOMY      THYROIDECTOMY      1985       Social History[1]    Family History   Problem Relation Name Age of Onset    Cancer Mother Binta Fischer     Diabetes Mother Binta Fischer     Breast cancer Mother Binta Fischer         late 70s    Osteoarthritis Mother Binta Fischer         double knee replacement    Alzheimer's disease Mother Binta Fischer     Heart disease Father Spike Fischer     Alzheimer's disease Maternal Aunt x2     Alzheimer's disease Maternal Uncle      Alzheimer's disease Maternal Grandmother           ROS    Current Outpatient Medications on File Prior to Visit   Medication Sig    amLODIPine (NORVASC) 10 MG tablet Take 1 tablet (10 mg total) by mouth once daily.    aspirin (ECOTRIN) 81 MG EC tablet Aspir-81 81 mg tablet,delayed release  Take 1 tablet every day by oral route for 30 days.    atenoloL (TENORMIN) 50 MG tablet Take 1 tablet (50 mg total) by mouth once daily.    blood sugar diagnostic (ACCU-CHEK SMARTVIEW TEST STRIP) Strp Use to test blood glucose two (2) times daily as directed with insurance preferred meter and  supplies    estradioL (ESTRACE) 0.01 % (0.1 mg/gram) vaginal cream Place 1 g vaginally twice a week.    fluticasone propionate (FLONASE) 50 mcg/actuation nasal spray 1 spray (50 mcg total) by Each Nostril route Daily. Shake Liquid and use    hydroCHLOROthiazide 12.5 MG Tab Take 1 tablet (12.5 mg total) by mouth every Mon, Wed, Fri.    lancets (ACCU-CHEK FASTCLIX LANCET DRUM) Misc Use to test blood glucose two (2) times daily as directed with insurance preferred meter and supplies    levothyroxine (SYNTHROID) 75 MCG tablet TAKE 1 TABLET BY MOUTH EVERY DAY    lisinopriL (PRINIVIL,ZESTRIL) 40 MG tablet Take 1 tablet (40 mg total) by mouth once daily.    loperamide (IMODIUM A-D) 2 mg Tab Take 2 mg by mouth 4 (four) times daily as needed (ANTI DIARRHEAL).    metFORMIN (GLUCOPHAGE) 500 MG tablet Take 1 tablet (500 mg total) by mouth 2 (two) times daily with meals.    mometasone (NASONEX) 50 mcg/actuation nasal spray 2 sprays by Nasal route once daily.    multivitamin (ONE DAILY MULTIVITAMIN) per tablet Take 1 tablet by mouth once daily.    pravastatin (PRAVACHOL) 80 MG tablet TAKE 1 TABLET(80 MG) BY MOUTH EVERY EVENING    fexofenadine (ALLEGRA) 180 MG tablet Take 1 tablet (180 mg total) by mouth once daily. (Patient not taking: Reported on 8/5/2025)    glimepiride (AMARYL) 1 MG tablet Take 1 tablet (1 mg total) by mouth before breakfast.     No current facility-administered medications on file prior to visit.       Objective:   Objective:  Wt Readings from Last 3 Encounters:   08/05/25 76.8 kg (169 lb 5 oz)   06/10/25 76.9 kg (169 lb 8.5 oz)   06/04/25 76.9 kg (169 lb 8.5 oz)     Temp Readings from Last 3 Encounters:   06/03/25 98.2 °F (36.8 °C)   05/13/25 98.1 °F (36.7 °C) (Oral)   05/09/25 98.2 °F (36.8 °C) (Tympanic)     BP Readings from Last 3 Encounters:   08/05/25 137/67   07/24/25 133/72   06/13/25 131/69     Pulse Readings from Last 3 Encounters:   08/05/25 71   06/10/25 (!) 16   06/03/25 72       Physical Exam     Vitals: Blood pressure: 137/67.  General: No acute distress. Well-developed. Well-nourished.  Eyes: EOMI. Sclerae anicteric.  HENT: Normocephalic. Atraumatic. Nares patent. Moist oral mucosa.  Ears: Bilateral TMs clear. Bilateral EACs clear.  Cardiovascular: Regular rate. Regular rhythm. No murmurs. No rubs. No gallops. Normal S1, S2.  Respiratory: Normal respiratory effort. Clear to auscultation bilaterally. No rales. No rhonchi. No wheezing.  Abdomen: Soft. Non-tender. Non-distended. Normoactive bowel sounds.  Musculoskeletal: No  obvious deformity.  Extremities: No lower extremity edema.  Neurological: Alert & oriented x3. No slurred speech. Normal gait.  Psychiatric: Normal mood. Normal affect. Good insight. Good judgment.  Skin: Warm. Dry. No rash.         Physical Exam      Lab Results   Component Value Date    CHOL 128 07/23/2025    CHOL 134 05/06/2024    CHOL 125 03/15/2023     Lab Results   Component Value Date    LDLCALC 45.6 (L) 07/23/2025    LDLCALC 54.0 (L) 05/06/2024    LDLCALC 53.0 (L) 03/15/2023         Chemistry        Component Value Date/Time     07/23/2025 0738     11/07/2024 1024    K 4.9 07/23/2025 0738    K 4.2 11/07/2024 1024     07/23/2025 0738     11/07/2024 1024    CO2 21 (L) 07/23/2025 0738    CO2 19 (L) 11/07/2024 1024    BUN 15 07/23/2025 0738    CREATININE 1.0 07/23/2025 0738     (H) 07/23/2025 0738    GLU 97 11/07/2024 1024        Component Value Date/Time    CALCIUM 9.2 07/23/2025 0738    CALCIUM 9.4 11/07/2024 1024    ALKPHOS 60 07/23/2025 0738    ALKPHOS 62 11/07/2024 1024    AST 21 07/23/2025 0738    AST 24 11/07/2024 1024    ALT 21 07/23/2025 0738    ALT 20 11/07/2024 1024    BILITOT 0.4 07/23/2025 0738    BILITOT 0.5 11/07/2024 1024    ESTGFRAFRICA 43 (A) 06/08/2022 0837    EGFRNONAA 37 (A) 06/08/2022 0837          Lab Results   Component Value Date    TSH 1.268 07/23/2025       Lab Results   Component Value Date    WBC 9.46 07/23/2025    HGB  13.1 07/23/2025    HCT 40.8 07/23/2025    MCV 93 07/23/2025     07/23/2025       @LABRCNTIP(BNP,BNPTRIAGEBLO)@       Imaging:  ======    No results found for this or any previous visit.    No results found for this or any previous visit.    No results found for this or any previous visit.      No results found for this or any previous visit.      No valid procedures specified.        No results found for this or any previous visit.      No results found for this or any previous visit.      No results found for this or any previous visit.      Lab Results   Component Value Date    HGBA1C 6.7 (H) 07/23/2025         The ASCVD Risk score (Belem DK, et al., 2019) failed to calculate for the following reasons:    The valid total cholesterol range is 130 to 320 mg/dL    Diagnostic Results:  ECG: Reviewed    7.2024  Summary  Show Result Comparison     Left Ventricle: The left ventricle is normal in size. Normal wall thickness. There is concentric remodeling. There is normal systolic function with a visually estimated ejection fraction of 65 - 70%. There is normal diastolic function.    Right Ventricle: Normal right ventricular cavity size. Wall thickness is normal. Systolic function is normal.    Left Atrium: Left atrium is dilated.    Pulmonary Artery: The estimated pulmonary artery systolic pressure is 21 mmHg.    IVC/SVC: Normal venous pressure at 3 mmHg.    Stress Protocol: The patient exercised for 6 minutes 33 seconds on a Rebel protocol, corresponding to a functional capacity of 9METS, achieving a peak heart rate of 127 bpm, which is 91% of the age predicted maximum heart rate. The patient experienced no angina during the test. Their exercise capacity was normal. The patient reported no symptoms during the stress test. The test was stopped because the patient requested it and they experienced fatigue and leg fatigue. Target heart rate achieved.    Baseline ECG: The Baseline ECG reveals sinus rhythm. The axis  is normal. The ST segments are normal.    Stress ECG: There are no ST segment deviation identified during the protocol. There are no arrhythmias during stress.    ECG Conclusion: The ECG portion of the study is negative for ischemia.    Post-stress Impression: The study is negative with no echocardiographic evidence of stress induced ischemia.    Assessment and Plan:   Essential hypertension    Ex-smoker    PVC (premature ventricular contraction)    White coat syndrome with diagnosis of hypertension    Type 2 diabetes mellitus with hyperglycemia, without long-term current use of insulin    Other hyperlipidemia        Assessment & Plan    E11.65 Type 2 diabetes mellitus with hyperglycemia, without long-term current use of insulin  I10 Essential hypertension  Z87.891 Ex-smoker  I49.3 PVC (premature ventricular contraction)  E78.49 Other hyperlipidemia    Blood pressure has improved from initial reading, likely due to white coat syndrome.  Fasting glucose (172) and A1C increased from 6.3 to 6.7, likely due to discontinuation of glyburide.  Cholesterol levels at target, with LDL at 45.    E11.65 TYPE 2 DIABETES MELLITUS WITH HYPERGLYCEMIA, WITHOUT LONG-TERM CURRENT USE OF INSULIN:  - Follow up with Dr. Thakkar regarding A1C increase and morning blood sugar management.    I10 ESSENTIAL HYPERTENSION:  - Explained the concept of white coat HTN and its potential impact on blood pressure readings.  - Patient to reduce fast food consumption to help lower sodium intake, continue current exercise regimen (gym 3 days a week, bike riding, weight lifting).  - Continued amlodipine 10 mg, atenolol, HCTZ three days a week (Monday, Wednesday, Friday), and lisinopril for blood pressure.    E78.49 OTHER HYPERLIPIDEMIA:  - Continued pravastatin for cholesterol.  - Follow up in 6 months.             Healthy weight goals were discussed in clinic  Reviewed all tests and above medical conditions with patient in detail and formulated treatment  plan.    Follow up in 6 months         [1]   Social History  Tobacco Use    Smoking status: Former     Current packs/day: 0.75     Average packs/day: 0.8 packs/day for 25.0 years (18.8 ttl pk-yrs)     Types: Cigarettes    Smokeless tobacco: Never    Tobacco comments:     Quit over 30 yearsago   Substance Use Topics    Alcohol use: Not Currently     Comment: occ    Drug use: Never

## 2025-08-06 ENCOUNTER — PATIENT MESSAGE (OUTPATIENT)
Dept: INTERNAL MEDICINE | Facility: CLINIC | Age: 80
End: 2025-08-06
Payer: MEDICARE

## 2025-08-06 ENCOUNTER — TELEPHONE (OUTPATIENT)
Dept: INTERNAL MEDICINE | Facility: CLINIC | Age: 80
End: 2025-08-06
Payer: MEDICARE

## 2025-08-06 DIAGNOSIS — Z12.31 ENCOUNTER FOR SCREENING MAMMOGRAM FOR MALIGNANT NEOPLASM OF BREAST: Primary | ICD-10-CM

## 2025-08-06 NOTE — TELEPHONE ENCOUNTER
Copied from CRM #[id number]. Topic: Appointments - Amb Referral  >> Aug 6, 2025 11:25 AM Loren wrote:  ..Type: Orders Request     What orders/ testing are being requested? mammogram     Is there a future appointment scheduled for the patient with PCP? no     When? N/a     Would you prefer a response via [facility]? Call back, .[phone]     Comments: pt is needing orders put in for  mammogram

## 2025-08-20 ENCOUNTER — HOSPITAL ENCOUNTER (OUTPATIENT)
Dept: RADIOLOGY | Facility: HOSPITAL | Age: 80
Discharge: HOME | End: 2025-08-20
Attending: FAMILY MEDICINE
Payer: MEDICARE

## 2025-08-20 DIAGNOSIS — E11.8 CONTROLLED TYPE 2 DIABETES MELLITUS WITH COMPLICATION, WITHOUT LONG-TERM CURRENT USE OF INSULIN: ICD-10-CM

## 2025-08-20 DIAGNOSIS — Z12.31 ENCOUNTER FOR SCREENING MAMMOGRAM FOR MALIGNANT NEOPLASM OF BREAST: ICD-10-CM

## 2025-08-20 PROCEDURE — 77063 BREAST TOMOSYNTHESIS BI: CPT | Mod: TC,HCNC

## 2025-08-20 PROCEDURE — 77067 SCR MAMMO BI INCL CAD: CPT | Mod: 26,HCNC,, | Performed by: STUDENT IN AN ORGANIZED HEALTH CARE EDUCATION/TRAINING PROGRAM

## 2025-08-20 PROCEDURE — 77063 BREAST TOMOSYNTHESIS BI: CPT | Mod: 26,HCNC,, | Performed by: STUDENT IN AN ORGANIZED HEALTH CARE EDUCATION/TRAINING PROGRAM

## 2025-08-20 RX ORDER — METFORMIN HYDROCHLORIDE 500 MG/1
500 TABLET ORAL 2 TIMES DAILY WITH MEALS
Qty: 180 TABLET | Refills: 1 | Status: SHIPPED | OUTPATIENT
Start: 2025-08-20

## 2025-08-26 RX ORDER — PRAVASTATIN SODIUM 80 MG/1
80 TABLET ORAL NIGHTLY
Qty: 90 TABLET | Refills: 2 | Status: SHIPPED | OUTPATIENT
Start: 2025-08-26

## 2025-08-31 DIAGNOSIS — E11.8 CONTROLLED TYPE 2 DIABETES MELLITUS WITH COMPLICATION, WITHOUT LONG-TERM CURRENT USE OF INSULIN: ICD-10-CM

## 2025-09-05 ENCOUNTER — TELEPHONE (OUTPATIENT)
Dept: INTERNAL MEDICINE | Facility: CLINIC | Age: 80
End: 2025-09-05
Payer: MEDICARE

## (undated) DEVICE — GLOVE SURGICAL LATEX SZ 6.5

## (undated) DEVICE — SET CYSTO IRRIGATION UNIV SPIK

## (undated) DEVICE — ELECTRODE ENDOPATH + II 5X34

## (undated) DEVICE — NDL PNEUMO INSUFFLATI 120MM

## (undated) DEVICE — HEADREST ROUND DISP FOAM 9IN

## (undated) DEVICE — SYR 3CC LUER LOC

## (undated) DEVICE — BAG TISSUE RETRIEVAL 5MM

## (undated) DEVICE — HANDLE PISTOL GRIP HAND CNTRL

## (undated) DEVICE — NDL SAFETY 25G X 1.5 ECLIPSE

## (undated) DEVICE — TROCAR ENDOPATH XCEL 8MM 10CM

## (undated) DEVICE — GRASPER ALLIGATOR MINILAP

## (undated) DEVICE — PACK BASIC SETUP SC BR

## (undated) DEVICE — UNDERGLOVE BIOGEL PI SZ 6.5 LF

## (undated) DEVICE — TIP GRASPER FENESTRATED DISP

## (undated) DEVICE — SYR 10CC LUER LOCK

## (undated) DEVICE — TROCAR ENDOPATH XCEL 5X100MM

## (undated) DEVICE — GOWN POLY REINF BRTH SLV XL

## (undated) DEVICE — APPLICATOR CHLORAPREP ORN 26ML

## (undated) DEVICE — COVER LIGHT HANDLE 80/CA

## (undated) DEVICE — DRAPE ABDOMINAL TIBURON 14X11

## (undated) DEVICE — APPLIER CLIP ENDO LIGAMAX 5MM

## (undated) DEVICE — SCISSOR 5MMX35CM DIRECT DRIVE

## (undated) DEVICE — SUPPORT ULNA NERVE PROTECTOR

## (undated) DEVICE — MANIFOLD 4 PORT

## (undated) DEVICE — TROCAR ENDOPATH XCEL 12MM 10CM

## (undated) DEVICE — KIT ANTIFOG W/SPONG & FLUID

## (undated) DEVICE — SUT MONOCRYL 4.0 PS2 CP496G

## (undated) DEVICE — DISSECTOR EPIX LAPA 5MMX35CM

## (undated) DEVICE — SOL 9P NACL IRR PIC IL

## (undated) DEVICE — ELECTRODE REM PLYHSV RETURN 9

## (undated) DEVICE — TUBING MEDI-VAC 20FT .25IN

## (undated) DEVICE — CONTAINER SPECIMEN OR STER 4OZ

## (undated) DEVICE — SOL NS 1000CC

## (undated) DEVICE — TOWEL OR DISP STRL BLUE 4/PK

## (undated) DEVICE — CANNULA ENDOPATH XCEL 5X100MM

## (undated) DEVICE — SUT CTD VICRYL 0 UND BR SUT

## (undated) DEVICE — DECANTER 6 VIAL